# Patient Record
Sex: MALE | Race: WHITE | Employment: OTHER | URBAN - METROPOLITAN AREA
[De-identification: names, ages, dates, MRNs, and addresses within clinical notes are randomized per-mention and may not be internally consistent; named-entity substitution may affect disease eponyms.]

---

## 2019-01-01 ENCOUNTER — HOSPITAL ENCOUNTER (OUTPATIENT)
Age: 74
Setting detail: OBSERVATION
Discharge: HOME | End: 2019-12-25
Attending: EMERGENCY MEDICINE | Admitting: INTERNAL MEDICINE
Payer: MEDICARE

## 2019-01-01 ENCOUNTER — APPOINTMENT (OUTPATIENT)
Dept: ULTRASOUND IMAGING | Age: 74
End: 2019-01-01
Payer: MEDICARE

## 2019-01-01 ENCOUNTER — HOSPITAL ENCOUNTER (OUTPATIENT)
Age: 74
Discharge: HOME | End: 2019-11-28
Payer: MEDICARE

## 2019-01-01 ENCOUNTER — HOSPITAL ENCOUNTER (OUTPATIENT)
Dept: GENERAL RADIOLOGY | Age: 74
Discharge: HOME | End: 2019-11-28
Payer: MEDICARE

## 2019-01-01 ENCOUNTER — APPOINTMENT (OUTPATIENT)
Dept: GENERAL RADIOLOGY | Age: 74
End: 2019-01-01
Payer: MEDICARE

## 2019-01-01 VITALS
TEMPERATURE: 97.4 F | OXYGEN SATURATION: 94 % | RESPIRATION RATE: 20 BRPM | HEART RATE: 82 BPM | WEIGHT: 270.1 LBS | HEIGHT: 70 IN | SYSTOLIC BLOOD PRESSURE: 143 MMHG | DIASTOLIC BLOOD PRESSURE: 87 MMHG | BODY MASS INDEX: 38.67 KG/M2

## 2019-01-01 DIAGNOSIS — R06.00 DYSPNEA, UNSPECIFIED TYPE: ICD-10-CM

## 2019-01-01 DIAGNOSIS — I50.9 ACUTE DECOMPENSATED HEART FAILURE (HCC): Primary | ICD-10-CM

## 2019-01-01 DIAGNOSIS — N17.9 ACUTE RENAL FAILURE, UNSPECIFIED ACUTE RENAL FAILURE TYPE (HCC): ICD-10-CM

## 2019-01-01 LAB
ALBUMIN SERPL-MCNC: 3.7 G/DL (ref 3.5–5.2)
ALP BLD-CCNC: 55 U/L (ref 40–129)
ALT SERPL-CCNC: 16 U/L (ref 0–40)
ANION GAP SERPL CALCULATED.3IONS-SCNC: 12 MMOL/L (ref 7–16)
ANION GAP SERPL CALCULATED.3IONS-SCNC: 14 MMOL/L (ref 7–16)
ANION GAP SERPL CALCULATED.3IONS-SCNC: 14 MMOL/L (ref 7–16)
ANION GAP SERPL CALCULATED.3IONS-SCNC: 17 MMOL/L (ref 7–16)
AST SERPL-CCNC: 23 U/L (ref 0–39)
BACTERIA: ABNORMAL /HPF
BILIRUB SERPL-MCNC: 0.4 MG/DL (ref 0–1.2)
BILIRUBIN URINE: NEGATIVE
BLOOD, URINE: ABNORMAL
BUN BLDV-MCNC: 46 MG/DL (ref 8–23)
BUN BLDV-MCNC: 48 MG/DL (ref 8–23)
BUN BLDV-MCNC: 48 MG/DL (ref 8–23)
BUN BLDV-MCNC: 50 MG/DL (ref 8–23)
CALCIUM SERPL-MCNC: 8.6 MG/DL (ref 8.6–10.2)
CALCIUM SERPL-MCNC: 9 MG/DL (ref 8.6–10.2)
CALCIUM SERPL-MCNC: 9.2 MG/DL (ref 8.6–10.2)
CALCIUM SERPL-MCNC: 9.6 MG/DL (ref 8.6–10.2)
CHLORIDE BLD-SCNC: 101 MMOL/L (ref 98–107)
CHLORIDE BLD-SCNC: 101 MMOL/L (ref 98–107)
CHLORIDE BLD-SCNC: 102 MMOL/L (ref 98–107)
CHLORIDE BLD-SCNC: 103 MMOL/L (ref 98–107)
CLARITY: CLEAR
CO2: 23 MMOL/L (ref 22–29)
CO2: 24 MMOL/L (ref 22–29)
CO2: 25 MMOL/L (ref 22–29)
CO2: 25 MMOL/L (ref 22–29)
COLOR: YELLOW
CREAT SERPL-MCNC: 2.8 MG/DL (ref 0.7–1.2)
CREAT SERPL-MCNC: 3 MG/DL (ref 0.7–1.2)
CREAT SERPL-MCNC: 3.2 MG/DL (ref 0.7–1.2)
CREAT SERPL-MCNC: 3.4 MG/DL (ref 0.7–1.2)
EKG ATRIAL RATE: 88 BPM
EKG Q-T INTERVAL: 414 MS
EKG QRS DURATION: 130 MS
EKG QTC CALCULATION (BAZETT): 506 MS
EKG R AXIS: -13 DEGREES
EKG T AXIS: 41 DEGREES
EKG VENTRICULAR RATE: 90 BPM
EPITHELIAL CELLS, UA: ABNORMAL /HPF
GFR AFRICAN AMERICAN: 22
GFR AFRICAN AMERICAN: 23
GFR AFRICAN AMERICAN: 25
GFR AFRICAN AMERICAN: 27
GFR NON-AFRICAN AMERICAN: 18 ML/MIN/1.73
GFR NON-AFRICAN AMERICAN: 19 ML/MIN/1.73
GFR NON-AFRICAN AMERICAN: 21 ML/MIN/1.73
GFR NON-AFRICAN AMERICAN: 22 ML/MIN/1.73
GLUCOSE BLD-MCNC: 107 MG/DL (ref 74–99)
GLUCOSE BLD-MCNC: 112 MG/DL (ref 74–99)
GLUCOSE BLD-MCNC: 113 MG/DL (ref 74–99)
GLUCOSE BLD-MCNC: 118 MG/DL (ref 74–99)
GLUCOSE URINE: NEGATIVE MG/DL
HCT VFR BLD CALC: 38.7 % (ref 37–54)
HCT VFR BLD CALC: 39.7 % (ref 37–54)
HCT VFR BLD CALC: 41.7 % (ref 37–54)
HEMOGLOBIN: 11.8 G/DL (ref 12.5–16.5)
HEMOGLOBIN: 12.3 G/DL (ref 12.5–16.5)
HEMOGLOBIN: 12.5 G/DL (ref 12.5–16.5)
KETONES, URINE: NEGATIVE MG/DL
LEUKOCYTE ESTERASE, URINE: NEGATIVE
MAGNESIUM: 1.8 MG/DL (ref 1.6–2.6)
MAGNESIUM: 1.9 MG/DL (ref 1.6–2.6)
MCH RBC QN AUTO: 28.7 PG (ref 26–35)
MCH RBC QN AUTO: 29 PG (ref 26–35)
MCH RBC QN AUTO: 29.5 PG (ref 26–35)
MCHC RBC AUTO-ENTMCNC: 30 % (ref 32–34.5)
MCHC RBC AUTO-ENTMCNC: 30.5 % (ref 32–34.5)
MCHC RBC AUTO-ENTMCNC: 31 % (ref 32–34.5)
MCV RBC AUTO: 94.2 FL (ref 80–99.9)
MCV RBC AUTO: 95.2 FL (ref 80–99.9)
MCV RBC AUTO: 96.8 FL (ref 80–99.9)
NITRITE, URINE: NEGATIVE
PDW BLD-RTO: 14.5 FL (ref 11.5–15)
PDW BLD-RTO: 14.5 FL (ref 11.5–15)
PDW BLD-RTO: 14.6 FL (ref 11.5–15)
PH UA: 5.5 (ref 5–9)
PHOSPHORUS: 3.7 MG/DL (ref 2.5–4.5)
PHOSPHORUS: 4.3 MG/DL (ref 2.5–4.5)
PLATELET # BLD: 178 E9/L (ref 130–450)
PLATELET # BLD: 193 E9/L (ref 130–450)
PLATELET # BLD: 193 E9/L (ref 130–450)
PMV BLD AUTO: 10.6 FL (ref 7–12)
PMV BLD AUTO: 10.9 FL (ref 7–12)
PMV BLD AUTO: 11.1 FL (ref 7–12)
POTASSIUM REFLEX MAGNESIUM: 4.9 MMOL/L (ref 3.5–5)
POTASSIUM SERPL-SCNC: 4.1 MMOL/L (ref 3.5–5)
POTASSIUM SERPL-SCNC: 4.4 MMOL/L (ref 3.5–5)
POTASSIUM SERPL-SCNC: 4.9 MMOL/L (ref 3.5–5)
PRO-BNP: ABNORMAL PG/ML (ref 0–125)
PROTEIN UA: 100 MG/DL
RBC # BLD: 4.11 E12/L (ref 3.8–5.8)
RBC # BLD: 4.17 E12/L (ref 3.8–5.8)
RBC # BLD: 4.31 E12/L (ref 3.8–5.8)
RBC UA: ABNORMAL /HPF (ref 0–2)
SODIUM BLD-SCNC: 139 MMOL/L (ref 132–146)
SODIUM BLD-SCNC: 140 MMOL/L (ref 132–146)
SODIUM BLD-SCNC: 140 MMOL/L (ref 132–146)
SODIUM BLD-SCNC: 142 MMOL/L (ref 132–146)
SPECIFIC GRAVITY UA: >=1.03 (ref 1–1.03)
TOTAL PROTEIN: 7.2 G/DL (ref 6.4–8.3)
TROPONIN: <0.01 NG/ML (ref 0–0.03)
UROBILINOGEN, URINE: 0.2 E.U./DL
WBC # BLD: 6.4 E9/L (ref 4.5–11.5)
WBC # BLD: 7.7 E9/L (ref 4.5–11.5)
WBC # BLD: 8.3 E9/L (ref 4.5–11.5)
WBC UA: ABNORMAL /HPF (ref 0–5)

## 2019-01-01 PROCEDURE — 85027 COMPLETE CBC AUTOMATED: CPT

## 2019-01-01 PROCEDURE — 84484 ASSAY OF TROPONIN QUANT: CPT

## 2019-01-01 PROCEDURE — 96376 TX/PRO/DX INJ SAME DRUG ADON: CPT

## 2019-01-01 PROCEDURE — 71046 X-RAY EXAM CHEST 2 VIEWS: CPT

## 2019-01-01 PROCEDURE — 36415 COLL VENOUS BLD VENIPUNCTURE: CPT

## 2019-01-01 PROCEDURE — 6370000000 HC RX 637 (ALT 250 FOR IP): Performed by: INTERNAL MEDICINE

## 2019-01-01 PROCEDURE — 2500000003 HC RX 250 WO HCPCS: Performed by: STUDENT IN AN ORGANIZED HEALTH CARE EDUCATION/TRAINING PROGRAM

## 2019-01-01 PROCEDURE — 84100 ASSAY OF PHOSPHORUS: CPT

## 2019-01-01 PROCEDURE — 76770 US EXAM ABDO BACK WALL COMP: CPT

## 2019-01-01 PROCEDURE — 2500000003 HC RX 250 WO HCPCS: Performed by: INTERNAL MEDICINE

## 2019-01-01 PROCEDURE — G0378 HOSPITAL OBSERVATION PER HR: HCPCS

## 2019-01-01 PROCEDURE — 96374 THER/PROPH/DIAG INJ IV PUSH: CPT

## 2019-01-01 PROCEDURE — 83735 ASSAY OF MAGNESIUM: CPT

## 2019-01-01 PROCEDURE — 93005 ELECTROCARDIOGRAM TRACING: CPT | Performed by: NURSE PRACTITIONER

## 2019-01-01 PROCEDURE — 81001 URINALYSIS AUTO W/SCOPE: CPT

## 2019-01-01 PROCEDURE — 80048 BASIC METABOLIC PNL TOTAL CA: CPT

## 2019-01-01 PROCEDURE — 99285 EMERGENCY DEPT VISIT HI MDM: CPT

## 2019-01-01 PROCEDURE — 83880 ASSAY OF NATRIURETIC PEPTIDE: CPT

## 2019-01-01 PROCEDURE — 93010 ELECTROCARDIOGRAM REPORT: CPT | Performed by: INTERNAL MEDICINE

## 2019-01-01 PROCEDURE — 2580000003 HC RX 258: Performed by: INTERNAL MEDICINE

## 2019-01-01 PROCEDURE — 80053 COMPREHEN METABOLIC PANEL: CPT

## 2019-01-01 RX ORDER — ISOSORBIDE MONONITRATE 60 MG/1
60 TABLET, EXTENDED RELEASE ORAL DAILY
Qty: 30 TABLET | Refills: 3 | Status: ON HOLD
Start: 2019-01-01 | End: 2020-01-01

## 2019-01-01 RX ORDER — ISOSORBIDE MONONITRATE 30 MG/1
30 TABLET, EXTENDED RELEASE ORAL DAILY
Status: DISCONTINUED | OUTPATIENT
Start: 2019-01-01 | End: 2019-01-01

## 2019-01-01 RX ORDER — ONDANSETRON 2 MG/ML
4 INJECTION INTRAMUSCULAR; INTRAVENOUS EVERY 6 HOURS PRN
Status: DISCONTINUED | OUTPATIENT
Start: 2019-01-01 | End: 2019-01-01 | Stop reason: HOSPADM

## 2019-01-01 RX ORDER — BUMETANIDE 2 MG/1
1 TABLET ORAL DAILY
Qty: 30 TABLET | Refills: 3 | Status: ON HOLD | OUTPATIENT
Start: 2019-01-01 | End: 2020-01-01 | Stop reason: ALTCHOICE

## 2019-01-01 RX ORDER — SODIUM CHLORIDE 0.9 % (FLUSH) 0.9 %
10 SYRINGE (ML) INJECTION PRN
Status: DISCONTINUED | OUTPATIENT
Start: 2019-01-01 | End: 2019-01-01 | Stop reason: HOSPADM

## 2019-01-01 RX ORDER — BUPROPION HYDROCHLORIDE 150 MG/1
150 TABLET ORAL EVERY MORNING
Status: DISCONTINUED | OUTPATIENT
Start: 2019-01-01 | End: 2019-01-01 | Stop reason: HOSPADM

## 2019-01-01 RX ORDER — BUMETANIDE 0.25 MG/ML
1 INJECTION, SOLUTION INTRAMUSCULAR; INTRAVENOUS DAILY
Status: DISCONTINUED | OUTPATIENT
Start: 2019-01-01 | End: 2019-01-01

## 2019-01-01 RX ORDER — METOPROLOL SUCCINATE 25 MG/1
25 TABLET, EXTENDED RELEASE ORAL 2 TIMES DAILY
Status: DISCONTINUED | OUTPATIENT
Start: 2019-01-01 | End: 2019-01-01 | Stop reason: HOSPADM

## 2019-01-01 RX ORDER — ROSUVASTATIN CALCIUM 5 MG/1
5 TABLET, COATED ORAL
Status: ON HOLD | COMMUNITY
End: 2020-01-01 | Stop reason: SINTOL

## 2019-01-01 RX ORDER — ACETAMINOPHEN 325 MG/1
650 TABLET ORAL EVERY 4 HOURS PRN
Status: DISCONTINUED | OUTPATIENT
Start: 2019-01-01 | End: 2019-01-01 | Stop reason: HOSPADM

## 2019-01-01 RX ORDER — TIZANIDINE 4 MG/1
4 TABLET ORAL EVERY 8 HOURS PRN
Status: DISCONTINUED | OUTPATIENT
Start: 2019-01-01 | End: 2019-01-01 | Stop reason: HOSPADM

## 2019-01-01 RX ORDER — LEVOTHYROXINE SODIUM 0.03 MG/1
25 TABLET ORAL DAILY
Status: DISCONTINUED | OUTPATIENT
Start: 2019-01-01 | End: 2019-01-01 | Stop reason: HOSPADM

## 2019-01-01 RX ORDER — SODIUM CHLORIDE 0.9 % (FLUSH) 0.9 %
10 SYRINGE (ML) INJECTION EVERY 12 HOURS SCHEDULED
Status: DISCONTINUED | OUTPATIENT
Start: 2019-01-01 | End: 2019-01-01 | Stop reason: HOSPADM

## 2019-01-01 RX ORDER — BUMETANIDE 0.25 MG/ML
1 INJECTION, SOLUTION INTRAMUSCULAR; INTRAVENOUS ONCE
Status: COMPLETED | OUTPATIENT
Start: 2019-01-01 | End: 2019-01-01

## 2019-01-01 RX ORDER — ROSUVASTATIN CALCIUM 5 MG/1
5 TABLET, COATED ORAL
Status: DISCONTINUED | OUTPATIENT
Start: 2019-01-01 | End: 2019-01-01 | Stop reason: HOSPADM

## 2019-01-01 RX ORDER — BUPROPION HYDROCHLORIDE 150 MG/1
150 TABLET ORAL EVERY MORNING
COMMUNITY

## 2019-01-01 RX ORDER — ISOSORBIDE MONONITRATE 60 MG/1
60 TABLET, EXTENDED RELEASE ORAL DAILY
Status: DISCONTINUED | OUTPATIENT
Start: 2019-01-01 | End: 2019-01-01 | Stop reason: HOSPADM

## 2019-01-01 RX ADMIN — LEVOTHYROXINE SODIUM 25 MCG: 25 TABLET ORAL at 10:05

## 2019-01-01 RX ADMIN — BUMETANIDE 1 MG: 0.25 INJECTION INTRAMUSCULAR; INTRAVENOUS at 10:06

## 2019-01-01 RX ADMIN — METOPROLOL SUCCINATE 25 MG: 25 TABLET, EXTENDED RELEASE ORAL at 10:05

## 2019-01-01 RX ADMIN — ACETAMINOPHEN 650 MG: 325 TABLET, FILM COATED ORAL at 21:01

## 2019-01-01 RX ADMIN — BUMETANIDE 1 MG: 0.25 INJECTION INTRAMUSCULAR; INTRAVENOUS at 12:51

## 2019-01-01 RX ADMIN — METOPROLOL SUCCINATE 25 MG: 25 TABLET, EXTENDED RELEASE ORAL at 21:01

## 2019-01-01 RX ADMIN — LEVOTHYROXINE SODIUM 25 MCG: 25 TABLET ORAL at 06:24

## 2019-01-01 RX ADMIN — TIZANIDINE 4 MG: 4 TABLET ORAL at 04:36

## 2019-01-01 RX ADMIN — ISOSORBIDE MONONITRATE 60 MG: 60 TABLET ORAL at 08:41

## 2019-01-01 RX ADMIN — APIXABAN 5 MG: 5 TABLET, FILM COATED ORAL at 10:06

## 2019-01-01 RX ADMIN — METOPROLOL SUCCINATE 25 MG: 25 TABLET, EXTENDED RELEASE ORAL at 08:41

## 2019-01-01 RX ADMIN — ROSUVASTATIN CALCIUM 5 MG: 5 TABLET, FILM COATED ORAL at 08:41

## 2019-01-01 RX ADMIN — APIXABAN 5 MG: 5 TABLET, FILM COATED ORAL at 20:41

## 2019-01-01 RX ADMIN — ISOSORBIDE MONONITRATE 60 MG: 60 TABLET ORAL at 10:06

## 2019-01-01 RX ADMIN — BUPROPION HYDROCHLORIDE 150 MG: 150 TABLET, EXTENDED RELEASE ORAL at 08:41

## 2019-01-01 RX ADMIN — APIXABAN 5 MG: 5 TABLET, FILM COATED ORAL at 08:41

## 2019-01-01 RX ADMIN — SODIUM CHLORIDE, PRESERVATIVE FREE 10 ML: 5 INJECTION INTRAVENOUS at 08:42

## 2019-01-01 RX ADMIN — BUPROPION HYDROCHLORIDE 150 MG: 150 TABLET, EXTENDED RELEASE ORAL at 10:06

## 2019-01-01 RX ADMIN — BUMETANIDE 1 MG: 0.25 INJECTION INTRAMUSCULAR; INTRAVENOUS at 21:01

## 2019-01-01 RX ADMIN — APIXABAN 5 MG: 5 TABLET, FILM COATED ORAL at 21:01

## 2019-01-01 RX ADMIN — BUMETANIDE 1 MG: 0.25 INJECTION INTRAMUSCULAR; INTRAVENOUS at 08:40

## 2019-01-01 RX ADMIN — TIZANIDINE 4 MG: 4 TABLET ORAL at 21:00

## 2019-01-01 RX ADMIN — ISOSORBIDE MONONITRATE 30 MG: 30 TABLET, EXTENDED RELEASE ORAL at 21:05

## 2019-01-01 RX ADMIN — ACETAMINOPHEN 650 MG: 325 TABLET, FILM COATED ORAL at 19:08

## 2019-01-01 RX ADMIN — SODIUM CHLORIDE, PRESERVATIVE FREE 10 ML: 5 INJECTION INTRAVENOUS at 10:07

## 2019-01-01 RX ADMIN — METOPROLOL SUCCINATE 25 MG: 25 TABLET, EXTENDED RELEASE ORAL at 20:41

## 2019-01-01 RX ADMIN — SODIUM CHLORIDE, PRESERVATIVE FREE 10 ML: 5 INJECTION INTRAVENOUS at 20:42

## 2019-01-01 ASSESSMENT — PAIN DESCRIPTION - LOCATION
LOCATION: HEAD
LOCATION: GENERALIZED;COCCYX;BUTTOCKS
LOCATION: ABDOMEN

## 2019-01-01 ASSESSMENT — PAIN DESCRIPTION - PAIN TYPE
TYPE: ACUTE PAIN
TYPE: ACUTE PAIN
TYPE: CHRONIC PAIN;NEUROPATHIC PAIN;DEEP SOMATIC PAIN

## 2019-01-01 ASSESSMENT — PAIN SCALES - GENERAL
PAINLEVEL_OUTOF10: 0
PAINLEVEL_OUTOF10: 5
PAINLEVEL_OUTOF10: 3
PAINLEVEL_OUTOF10: 0
PAINLEVEL_OUTOF10: 8
PAINLEVEL_OUTOF10: 5

## 2019-01-01 ASSESSMENT — PAIN DESCRIPTION - DESCRIPTORS
DESCRIPTORS: ACHING
DESCRIPTORS: ACHING

## 2019-01-01 ASSESSMENT — PAIN DESCRIPTION - ORIENTATION: ORIENTATION: ANTERIOR

## 2019-01-01 ASSESSMENT — PAIN DESCRIPTION - FREQUENCY: FREQUENCY: INTERMITTENT

## 2019-12-23 PROBLEM — I50.9 ACUTE DECOMPENSATED HEART FAILURE (HCC): Status: ACTIVE | Noted: 2019-01-01

## 2019-12-23 PROBLEM — I51.9 LEFT VENTRICULAR SYSTOLIC DYSFUNCTION: Chronic | Status: ACTIVE | Noted: 2019-01-01

## 2019-12-23 PROBLEM — N18.4 CKD (CHRONIC KIDNEY DISEASE) STAGE 4, GFR 15-29 ML/MIN (HCC): Chronic | Status: ACTIVE | Noted: 2019-01-01

## 2020-01-01 ENCOUNTER — APPOINTMENT (OUTPATIENT)
Dept: CT IMAGING | Age: 75
End: 2020-01-01
Payer: MEDICARE

## 2020-01-01 ENCOUNTER — APPOINTMENT (OUTPATIENT)
Dept: GENERAL RADIOLOGY | Age: 75
DRG: 308 | End: 2020-01-01
Payer: MEDICARE

## 2020-01-01 ENCOUNTER — APPOINTMENT (OUTPATIENT)
Dept: ULTRASOUND IMAGING | Age: 75
DRG: 291 | End: 2020-01-01
Payer: MEDICARE

## 2020-01-01 ENCOUNTER — APPOINTMENT (OUTPATIENT)
Dept: GENERAL RADIOLOGY | Age: 75
End: 2020-01-01
Payer: MEDICARE

## 2020-01-01 ENCOUNTER — TELEPHONE (OUTPATIENT)
Dept: SURGERY | Age: 75
End: 2020-01-01

## 2020-01-01 ENCOUNTER — OFFICE VISIT (OUTPATIENT)
Dept: SURGERY | Age: 75
End: 2020-01-01
Payer: MEDICARE

## 2020-01-01 ENCOUNTER — APPOINTMENT (OUTPATIENT)
Dept: ULTRASOUND IMAGING | Age: 75
End: 2020-01-01
Payer: MEDICARE

## 2020-01-01 ENCOUNTER — HOSPITAL ENCOUNTER (OUTPATIENT)
Age: 75
Setting detail: OBSERVATION
Discharge: HOME | End: 2020-01-07
Attending: EMERGENCY MEDICINE | Admitting: INTERNAL MEDICINE
Payer: MEDICARE

## 2020-01-01 ENCOUNTER — HOSPITAL ENCOUNTER (INPATIENT)
Age: 75
LOS: 3 days | Discharge: HOME HEALTH | DRG: 308 | End: 2020-02-04
Attending: EMERGENCY MEDICINE | Admitting: INTERNAL MEDICINE
Payer: MEDICARE

## 2020-01-01 ENCOUNTER — HOSPITAL ENCOUNTER (INPATIENT)
Age: 75
LOS: 6 days | Discharge: HOME HEALTH | DRG: 291 | End: 2020-01-21
Attending: EMERGENCY MEDICINE | Admitting: INTERNAL MEDICINE
Payer: MEDICARE

## 2020-01-01 ENCOUNTER — HOSPITAL ENCOUNTER (EMERGENCY)
Age: 75
Discharge: HOME | End: 2020-01-24
Attending: EMERGENCY MEDICINE
Payer: MEDICARE

## 2020-01-01 ENCOUNTER — HOSPITAL ENCOUNTER (OUTPATIENT)
Age: 75
Discharge: HOME | End: 2020-01-22
Payer: MEDICARE

## 2020-01-01 ENCOUNTER — HOSPITAL ENCOUNTER (EMERGENCY)
Age: 75
Discharge: HOME | End: 2020-01-28
Attending: EMERGENCY MEDICINE
Payer: MEDICARE

## 2020-01-01 ENCOUNTER — HOSPITAL ENCOUNTER (OUTPATIENT)
Dept: CARDIAC REHAB | Age: 75
Discharge: HOME | End: 2020-06-03

## 2020-01-01 ENCOUNTER — HOSPITAL ENCOUNTER (OUTPATIENT)
Age: 75
Discharge: HOME | End: 2020-04-02
Payer: MEDICARE

## 2020-01-01 ENCOUNTER — TELEPHONE (OUTPATIENT)
Dept: OTHER | Facility: CLINIC | Age: 75
End: 2020-01-01

## 2020-01-01 ENCOUNTER — HOSPITAL ENCOUNTER (OUTPATIENT)
Age: 75
Setting detail: OBSERVATION
Discharge: HOME | End: 2020-02-18
Attending: EMERGENCY MEDICINE | Admitting: INTERNAL MEDICINE
Payer: MEDICARE

## 2020-01-01 ENCOUNTER — APPOINTMENT (OUTPATIENT)
Dept: GENERAL RADIOLOGY | Age: 75
DRG: 291 | End: 2020-01-01
Payer: MEDICARE

## 2020-01-01 VITALS
BODY MASS INDEX: 35.78 KG/M2 | RESPIRATION RATE: 18 BRPM | HEART RATE: 103 BPM | WEIGHT: 249.9 LBS | DIASTOLIC BLOOD PRESSURE: 70 MMHG | HEIGHT: 70 IN | SYSTOLIC BLOOD PRESSURE: 118 MMHG | OXYGEN SATURATION: 95 % | TEMPERATURE: 97.9 F

## 2020-01-01 VITALS
RESPIRATION RATE: 18 BRPM | DIASTOLIC BLOOD PRESSURE: 59 MMHG | HEART RATE: 85 BPM | TEMPERATURE: 98.6 F | SYSTOLIC BLOOD PRESSURE: 94 MMHG | WEIGHT: 255 LBS | OXYGEN SATURATION: 93 % | HEIGHT: 70 IN | BODY MASS INDEX: 36.51 KG/M2

## 2020-01-01 VITALS
DIASTOLIC BLOOD PRESSURE: 94 MMHG | HEIGHT: 70 IN | SYSTOLIC BLOOD PRESSURE: 138 MMHG | HEART RATE: 98 BPM | OXYGEN SATURATION: 96 % | TEMPERATURE: 97.8 F | WEIGHT: 264.33 LBS | BODY MASS INDEX: 37.84 KG/M2 | RESPIRATION RATE: 16 BRPM

## 2020-01-01 VITALS
TEMPERATURE: 98 F | WEIGHT: 269.18 LBS | DIASTOLIC BLOOD PRESSURE: 58 MMHG | HEIGHT: 70 IN | RESPIRATION RATE: 16 BRPM | HEART RATE: 100 BPM | SYSTOLIC BLOOD PRESSURE: 105 MMHG | BODY MASS INDEX: 38.54 KG/M2 | OXYGEN SATURATION: 94 %

## 2020-01-01 VITALS
BODY MASS INDEX: 34.86 KG/M2 | RESPIRATION RATE: 20 BRPM | WEIGHT: 249 LBS | SYSTOLIC BLOOD PRESSURE: 86 MMHG | HEART RATE: 53 BPM | HEIGHT: 71 IN | DIASTOLIC BLOOD PRESSURE: 45 MMHG

## 2020-01-01 VITALS
RESPIRATION RATE: 15 BRPM | WEIGHT: 269 LBS | BODY MASS INDEX: 38.51 KG/M2 | HEART RATE: 66 BPM | OXYGEN SATURATION: 94 % | DIASTOLIC BLOOD PRESSURE: 70 MMHG | HEIGHT: 70 IN | SYSTOLIC BLOOD PRESSURE: 132 MMHG

## 2020-01-01 VITALS
WEIGHT: 266 LBS | OXYGEN SATURATION: 98 % | RESPIRATION RATE: 16 BRPM | TEMPERATURE: 97.8 F | SYSTOLIC BLOOD PRESSURE: 149 MMHG | DIASTOLIC BLOOD PRESSURE: 93 MMHG | HEART RATE: 79 BPM | HEIGHT: 70 IN | BODY MASS INDEX: 38.08 KG/M2

## 2020-01-01 VITALS
WEIGHT: 266 LBS | TEMPERATURE: 97.4 F | OXYGEN SATURATION: 98 % | HEART RATE: 103 BPM | BODY MASS INDEX: 38.08 KG/M2 | HEIGHT: 70 IN | SYSTOLIC BLOOD PRESSURE: 126 MMHG | DIASTOLIC BLOOD PRESSURE: 89 MMHG | RESPIRATION RATE: 18 BRPM

## 2020-01-01 LAB
ALBUMIN SERPL-MCNC: 3.8 G/DL (ref 3.5–5.2)
ALBUMIN SERPL-MCNC: 3.9 G/DL (ref 3.5–5.2)
ALBUMIN SERPL-MCNC: 4 G/DL (ref 3.5–5.2)
ALBUMIN SERPL-MCNC: 4 G/DL (ref 3.5–5.2)
ALP BLD-CCNC: 58 U/L (ref 40–129)
ALP BLD-CCNC: 61 U/L (ref 40–129)
ALP BLD-CCNC: 78 U/L (ref 40–129)
ALP BLD-CCNC: 79 U/L (ref 40–129)
ALT SERPL-CCNC: 15 U/L (ref 0–40)
ALT SERPL-CCNC: 16 U/L (ref 0–40)
ALT SERPL-CCNC: 23 U/L (ref 0–40)
ALT SERPL-CCNC: 45 U/L (ref 0–40)
AMORPHOUS: ABNORMAL
ANION GAP SERPL CALCULATED.3IONS-SCNC: 11 MMOL/L (ref 7–16)
ANION GAP SERPL CALCULATED.3IONS-SCNC: 12 MMOL/L (ref 7–16)
ANION GAP SERPL CALCULATED.3IONS-SCNC: 12 MMOL/L (ref 7–16)
ANION GAP SERPL CALCULATED.3IONS-SCNC: 14 MMOL/L (ref 7–16)
ANION GAP SERPL CALCULATED.3IONS-SCNC: 15 MMOL/L (ref 7–16)
ANION GAP SERPL CALCULATED.3IONS-SCNC: 16 MMOL/L (ref 7–16)
ANION GAP SERPL CALCULATED.3IONS-SCNC: 16 MMOL/L (ref 7–16)
ANION GAP SERPL CALCULATED.3IONS-SCNC: 18 MMOL/L (ref 7–16)
ANION GAP SERPL CALCULATED.3IONS-SCNC: 19 MMOL/L (ref 7–16)
ANION GAP SERPL CALCULATED.3IONS-SCNC: 19 MMOL/L (ref 7–16)
ANION GAP SERPL CALCULATED.3IONS-SCNC: 21 MMOL/L (ref 7–16)
ANION GAP SERPL CALCULATED.3IONS-SCNC: 25 MMOL/L (ref 7–16)
ANION GAP SERPL CALCULATED.3IONS-SCNC: 26 MMOL/L (ref 7–16)
ANION GAP SERPL CALCULATED.3IONS-SCNC: 28 MMOL/L (ref 7–16)
AST SERPL-CCNC: 100 U/L (ref 0–39)
AST SERPL-CCNC: 19 U/L (ref 0–39)
AST SERPL-CCNC: 19 U/L (ref 0–39)
AST SERPL-CCNC: 20 U/L (ref 0–39)
BACTERIA: ABNORMAL /HPF
BASOPHILS ABSOLUTE: 0.04 E9/L (ref 0–0.2)
BASOPHILS ABSOLUTE: 0.05 E9/L (ref 0–0.2)
BASOPHILS ABSOLUTE: 0.05 E9/L (ref 0–0.2)
BASOPHILS ABSOLUTE: 0.06 E9/L (ref 0–0.2)
BASOPHILS ABSOLUTE: 0.06 E9/L (ref 0–0.2)
BASOPHILS ABSOLUTE: 0.09 E9/L (ref 0–0.2)
BASOPHILS RELATIVE PERCENT: 0.3 % (ref 0–2)
BASOPHILS RELATIVE PERCENT: 0.5 % (ref 0–2)
BASOPHILS RELATIVE PERCENT: 0.6 % (ref 0–2)
BASOPHILS RELATIVE PERCENT: 0.6 % (ref 0–2)
BASOPHILS RELATIVE PERCENT: 0.7 % (ref 0–2)
BASOPHILS RELATIVE PERCENT: 0.9 % (ref 0–2)
BILIRUB SERPL-MCNC: 0.7 MG/DL (ref 0–1.2)
BILIRUB SERPL-MCNC: 0.7 MG/DL (ref 0–1.2)
BILIRUB SERPL-MCNC: 0.8 MG/DL (ref 0–1.2)
BILIRUB SERPL-MCNC: 1.7 MG/DL (ref 0–1.2)
BILIRUBIN URINE: ABNORMAL
BILIRUBIN URINE: NEGATIVE
BILIRUBIN URINE: NEGATIVE
BLOOD CULTURE, ROUTINE: NORMAL
BLOOD, URINE: ABNORMAL
BUN BLDV-MCNC: 42 MG/DL (ref 8–23)
BUN BLDV-MCNC: 44 MG/DL (ref 8–23)
BUN BLDV-MCNC: 48 MG/DL (ref 8–23)
BUN BLDV-MCNC: 49 MG/DL (ref 8–23)
BUN BLDV-MCNC: 52 MG/DL (ref 8–23)
BUN BLDV-MCNC: 54 MG/DL (ref 8–23)
BUN BLDV-MCNC: 56 MG/DL (ref 8–23)
BUN BLDV-MCNC: 56 MG/DL (ref 8–23)
BUN BLDV-MCNC: 58 MG/DL (ref 8–23)
BUN BLDV-MCNC: 60 MG/DL (ref 8–23)
BUN BLDV-MCNC: 62 MG/DL (ref 8–23)
BUN BLDV-MCNC: 64 MG/DL (ref 8–23)
BUN BLDV-MCNC: 65 MG/DL (ref 8–23)
BUN BLDV-MCNC: 66 MG/DL (ref 8–23)
BUN BLDV-MCNC: 66 MG/DL (ref 8–23)
BUN BLDV-MCNC: 67 MG/DL (ref 8–23)
BUN BLDV-MCNC: 82 MG/DL (ref 8–23)
BUN BLDV-MCNC: 93 MG/DL (ref 8–23)
BUN BLDV-MCNC: 95 MG/DL (ref 8–23)
CALCIUM SERPL-MCNC: 8.3 MG/DL (ref 8.6–10.2)
CALCIUM SERPL-MCNC: 8.4 MG/DL (ref 8.6–10.2)
CALCIUM SERPL-MCNC: 8.5 MG/DL (ref 8.6–10.2)
CALCIUM SERPL-MCNC: 8.6 MG/DL (ref 8.6–10.2)
CALCIUM SERPL-MCNC: 8.6 MG/DL (ref 8.6–10.2)
CALCIUM SERPL-MCNC: 8.7 MG/DL (ref 8.6–10.2)
CALCIUM SERPL-MCNC: 8.8 MG/DL (ref 8.6–10.2)
CALCIUM SERPL-MCNC: 8.9 MG/DL (ref 8.6–10.2)
CALCIUM SERPL-MCNC: 9 MG/DL (ref 8.6–10.2)
CALCIUM SERPL-MCNC: 9.2 MG/DL (ref 8.6–10.2)
CALCIUM SERPL-MCNC: 9.5 MG/DL (ref 8.6–10.2)
CALCIUM SERPL-MCNC: 9.6 MG/DL (ref 8.6–10.2)
CALCIUM SERPL-MCNC: 9.6 MG/DL (ref 8.6–10.2)
CHLORIDE BLD-SCNC: 100 MMOL/L (ref 98–107)
CHLORIDE BLD-SCNC: 100 MMOL/L (ref 98–107)
CHLORIDE BLD-SCNC: 101 MMOL/L (ref 98–107)
CHLORIDE BLD-SCNC: 103 MMOL/L (ref 98–107)
CHLORIDE BLD-SCNC: 104 MMOL/L (ref 98–107)
CHLORIDE BLD-SCNC: 87 MMOL/L (ref 98–107)
CHLORIDE BLD-SCNC: 89 MMOL/L (ref 98–107)
CHLORIDE BLD-SCNC: 91 MMOL/L (ref 98–107)
CHLORIDE BLD-SCNC: 91 MMOL/L (ref 98–107)
CHLORIDE BLD-SCNC: 92 MMOL/L (ref 98–107)
CHLORIDE BLD-SCNC: 92 MMOL/L (ref 98–107)
CHLORIDE BLD-SCNC: 94 MMOL/L (ref 98–107)
CHLORIDE BLD-SCNC: 95 MMOL/L (ref 98–107)
CHLORIDE BLD-SCNC: 95 MMOL/L (ref 98–107)
CHLORIDE BLD-SCNC: 96 MMOL/L (ref 98–107)
CHLORIDE BLD-SCNC: 96 MMOL/L (ref 98–107)
CHLORIDE BLD-SCNC: 97 MMOL/L (ref 98–107)
CHLORIDE BLD-SCNC: 97 MMOL/L (ref 98–107)
CHLORIDE BLD-SCNC: 98 MMOL/L (ref 98–107)
CHLORIDE URINE RANDOM: <20 MMOL/L
CHLORIDE URINE RANDOM: <20 MMOL/L
CHOLESTEROL, TOTAL: 128 MG/DL (ref 0–199)
CHP ED QC CHECK: NORMAL
CLARITY: CLEAR
CO2: 19 MMOL/L (ref 22–29)
CO2: 20 MMOL/L (ref 22–29)
CO2: 21 MMOL/L (ref 22–29)
CO2: 21 MMOL/L (ref 22–29)
CO2: 22 MMOL/L (ref 22–29)
CO2: 22 MMOL/L (ref 22–29)
CO2: 24 MMOL/L (ref 22–29)
CO2: 24 MMOL/L (ref 22–29)
CO2: 25 MMOL/L (ref 22–29)
CO2: 25 MMOL/L (ref 22–29)
CO2: 26 MMOL/L (ref 22–29)
CO2: 27 MMOL/L (ref 22–29)
CO2: 28 MMOL/L (ref 22–29)
CO2: 30 MMOL/L (ref 22–29)
CO2: 30 MMOL/L (ref 22–29)
CO2: 31 MMOL/L (ref 22–29)
CO2: 32 MMOL/L (ref 22–29)
COLOR: YELLOW
CREAT SERPL-MCNC: 1.9 MG/DL (ref 0.7–1.2)
CREAT SERPL-MCNC: 2.5 MG/DL (ref 0.7–1.2)
CREAT SERPL-MCNC: 2.6 MG/DL (ref 0.7–1.2)
CREAT SERPL-MCNC: 2.8 MG/DL (ref 0.7–1.2)
CREAT SERPL-MCNC: 2.8 MG/DL (ref 0.7–1.2)
CREAT SERPL-MCNC: 2.9 MG/DL (ref 0.7–1.2)
CREAT SERPL-MCNC: 2.9 MG/DL (ref 0.7–1.2)
CREAT SERPL-MCNC: 3.1 MG/DL (ref 0.7–1.2)
CREAT SERPL-MCNC: 3.2 MG/DL (ref 0.7–1.2)
CREAT SERPL-MCNC: 3.3 MG/DL (ref 0.7–1.2)
CREAT SERPL-MCNC: 3.5 MG/DL (ref 0.7–1.2)
CREAT SERPL-MCNC: 3.6 MG/DL (ref 0.7–1.2)
CREAT SERPL-MCNC: 3.6 MG/DL (ref 0.7–1.2)
CREAT SERPL-MCNC: 3.9 MG/DL (ref 0.7–1.2)
CREAT SERPL-MCNC: 4 MG/DL (ref 0.7–1.2)
CREAT SERPL-MCNC: 4.6 MG/DL (ref 0.7–1.2)
CREAT SERPL-MCNC: 4.7 MG/DL (ref 0.7–1.2)
CREAT SERPL-MCNC: 5.3 MG/DL (ref 0.7–1.2)
CREAT SERPL-MCNC: 5.5 MG/DL (ref 0.7–1.2)
CREATININE URINE: 140 MG/DL (ref 40–278)
CREATININE URINE: 147 MG/DL (ref 40–278)
CREATININE URINE: 161 MG/DL (ref 40–278)
CREATININE URINE: 89 MG/DL (ref 40–278)
CULTURE, BLOOD 2: NORMAL
EKG ATRIAL RATE: 119 BPM
EKG ATRIAL RATE: 129 BPM
EKG ATRIAL RATE: 90 BPM
EKG ATRIAL RATE: 98 BPM
EKG Q-T INTERVAL: 360 MS
EKG Q-T INTERVAL: 388 MS
EKG Q-T INTERVAL: 418 MS
EKG Q-T INTERVAL: 424 MS
EKG QRS DURATION: 122 MS
EKG QRS DURATION: 134 MS
EKG QRS DURATION: 138 MS
EKG QRS DURATION: 152 MS
EKG QTC CALCULATION (BAZETT): 493 MS
EKG QTC CALCULATION (BAZETT): 516 MS
EKG QTC CALCULATION (BAZETT): 535 MS
EKG QTC CALCULATION (BAZETT): 557 MS
EKG R AXIS: -39 DEGREES
EKG R AXIS: -61 DEGREES
EKG R AXIS: -62 DEGREES
EKG R AXIS: -74 DEGREES
EKG T AXIS: 104 DEGREES
EKG T AXIS: 23 DEGREES
EKG T AXIS: 54 DEGREES
EKG T AXIS: 68 DEGREES
EKG VENTRICULAR RATE: 113 BPM
EKG VENTRICULAR RATE: 124 BPM
EKG VENTRICULAR RATE: 92 BPM
EKG VENTRICULAR RATE: 96 BPM
EOSINOPHILS ABSOLUTE: 0.01 E9/L (ref 0.05–0.5)
EOSINOPHILS ABSOLUTE: 0.28 E9/L (ref 0.05–0.5)
EOSINOPHILS ABSOLUTE: 0.41 E9/L (ref 0.05–0.5)
EOSINOPHILS ABSOLUTE: 0.44 E9/L (ref 0.05–0.5)
EOSINOPHILS ABSOLUTE: 0.49 E9/L (ref 0.05–0.5)
EOSINOPHILS ABSOLUTE: 0.78 E9/L (ref 0.05–0.5)
EOSINOPHILS RELATIVE PERCENT: 0.1 % (ref 0–6)
EOSINOPHILS RELATIVE PERCENT: 2.9 % (ref 0–6)
EOSINOPHILS RELATIVE PERCENT: 4.1 % (ref 0–6)
EOSINOPHILS RELATIVE PERCENT: 4.6 % (ref 0–6)
EOSINOPHILS RELATIVE PERCENT: 5.4 % (ref 0–6)
EOSINOPHILS RELATIVE PERCENT: 8.6 % (ref 0–6)
EPITHELIAL CELLS, UA: ABNORMAL /HPF
EPITHELIAL CELLS, UA: ABNORMAL /HPF
GFR AFRICAN AMERICAN: 12
GFR AFRICAN AMERICAN: 13
GFR AFRICAN AMERICAN: 15
GFR AFRICAN AMERICAN: 15
GFR AFRICAN AMERICAN: 18
GFR AFRICAN AMERICAN: 18
GFR AFRICAN AMERICAN: 20
GFR AFRICAN AMERICAN: 20
GFR AFRICAN AMERICAN: 21
GFR AFRICAN AMERICAN: 22
GFR AFRICAN AMERICAN: 23
GFR AFRICAN AMERICAN: 24
GFR AFRICAN AMERICAN: 26
GFR AFRICAN AMERICAN: 26
GFR AFRICAN AMERICAN: 27
GFR AFRICAN AMERICAN: 27
GFR AFRICAN AMERICAN: 29
GFR AFRICAN AMERICAN: 31
GFR AFRICAN AMERICAN: 42
GFR NON-AFRICAN AMERICAN: 10 ML/MIN/1.73
GFR NON-AFRICAN AMERICAN: 11 ML/MIN/1.73
GFR NON-AFRICAN AMERICAN: 12 ML/MIN/1.73
GFR NON-AFRICAN AMERICAN: 13 ML/MIN/1.73
GFR NON-AFRICAN AMERICAN: 15 ML/MIN/1.73
GFR NON-AFRICAN AMERICAN: 15 ML/MIN/1.73
GFR NON-AFRICAN AMERICAN: 17 ML/MIN/1.73
GFR NON-AFRICAN AMERICAN: 18 ML/MIN/1.73
GFR NON-AFRICAN AMERICAN: 19 ML/MIN/1.73
GFR NON-AFRICAN AMERICAN: 20 ML/MIN/1.73
GFR NON-AFRICAN AMERICAN: 21 ML/MIN/1.73
GFR NON-AFRICAN AMERICAN: 21 ML/MIN/1.73
GFR NON-AFRICAN AMERICAN: 22 ML/MIN/1.73
GFR NON-AFRICAN AMERICAN: 22 ML/MIN/1.73
GFR NON-AFRICAN AMERICAN: 24 ML/MIN/1.73
GFR NON-AFRICAN AMERICAN: 25 ML/MIN/1.73
GFR NON-AFRICAN AMERICAN: 35 ML/MIN/1.73
GLUCOSE BLD-MCNC: 100 MG/DL (ref 74–99)
GLUCOSE BLD-MCNC: 104 MG/DL (ref 74–99)
GLUCOSE BLD-MCNC: 107 MG/DL (ref 74–99)
GLUCOSE BLD-MCNC: 113 MG/DL (ref 74–99)
GLUCOSE BLD-MCNC: 116 MG/DL (ref 74–99)
GLUCOSE BLD-MCNC: 117 MG/DL (ref 74–99)
GLUCOSE BLD-MCNC: 118 MG/DL (ref 74–99)
GLUCOSE BLD-MCNC: 120 MG/DL (ref 74–99)
GLUCOSE BLD-MCNC: 127 MG/DL (ref 74–99)
GLUCOSE BLD-MCNC: 148 MG/DL
GLUCOSE BLD-MCNC: 165 MG/DL (ref 74–99)
GLUCOSE BLD-MCNC: 89 MG/DL (ref 74–99)
GLUCOSE BLD-MCNC: 92 MG/DL (ref 74–99)
GLUCOSE BLD-MCNC: 96 MG/DL (ref 74–99)
GLUCOSE BLD-MCNC: 97 MG/DL (ref 74–99)
GLUCOSE BLD-MCNC: 98 MG/DL (ref 74–99)
GLUCOSE BLD-MCNC: 99 MG/DL (ref 74–99)
GLUCOSE URINE: NEGATIVE MG/DL
HAV IGM SER IA-ACNC: NORMAL
HBV SURFACE AB TITR SER: NORMAL {TITER}
HCT VFR BLD CALC: 37.3 % (ref 37–54)
HCT VFR BLD CALC: 37.8 % (ref 37–54)
HCT VFR BLD CALC: 37.9 % (ref 37–54)
HCT VFR BLD CALC: 37.9 % (ref 37–54)
HCT VFR BLD CALC: 38.7 % (ref 37–54)
HCT VFR BLD CALC: 39.1 % (ref 37–54)
HCT VFR BLD CALC: 40.9 % (ref 37–54)
HCT VFR BLD CALC: 45.3 % (ref 37–54)
HCT VFR BLD CALC: 47.3 % (ref 37–54)
HDLC SERPL-MCNC: 24 MG/DL
HEMOGLOBIN: 11.2 G/DL (ref 12.5–16.5)
HEMOGLOBIN: 11.4 G/DL (ref 12.5–16.5)
HEMOGLOBIN: 11.5 G/DL (ref 12.5–16.5)
HEMOGLOBIN: 11.6 G/DL (ref 12.5–16.5)
HEMOGLOBIN: 11.7 G/DL (ref 12.5–16.5)
HEMOGLOBIN: 11.9 G/DL (ref 12.5–16.5)
HEMOGLOBIN: 12.2 G/DL (ref 12.5–16.5)
HEMOGLOBIN: 13.6 G/DL (ref 12.5–16.5)
HEMOGLOBIN: 13.9 G/DL (ref 12.5–16.5)
HEPATITIS B CORE IGM ANTIBODY: NORMAL
HEPATITIS B SURFACE ANTIGEN INTERPRETATION: NORMAL
HEPATITIS C ANTIBODY INTERPRETATION: NORMAL
IMMATURE GRANULOCYTES #: 0.02 E9/L
IMMATURE GRANULOCYTES #: 0.03 E9/L
IMMATURE GRANULOCYTES #: 0.04 E9/L
IMMATURE GRANULOCYTES #: 0.05 E9/L
IMMATURE GRANULOCYTES #: 0.06 E9/L
IMMATURE GRANULOCYTES #: 0.09 E9/L
IMMATURE GRANULOCYTES %: 0.2 % (ref 0–5)
IMMATURE GRANULOCYTES %: 0.3 % (ref 0–5)
IMMATURE GRANULOCYTES %: 0.4 % (ref 0–5)
IMMATURE GRANULOCYTES %: 0.6 % (ref 0–5)
IMMATURE GRANULOCYTES %: 0.6 % (ref 0–5)
IMMATURE GRANULOCYTES %: 0.8 % (ref 0–5)
INR BLD: 1.7
INR BLD: 1.8
INR BLD: 3.4
KETONES, URINE: NEGATIVE MG/DL
LACTIC ACID: 3.3 MMOL/L (ref 0.5–2.2)
LACTIC ACID: 5.2 MMOL/L (ref 0.5–2.2)
LACTIC ACID: 6.8 MMOL/L (ref 0.5–2.2)
LACTIC ACID: 7.9 MMOL/L (ref 0.5–2.2)
LDL CHOLESTEROL CALCULATED: 78 MG/DL (ref 0–99)
LEUKOCYTE ESTERASE, URINE: ABNORMAL
LEUKOCYTE ESTERASE, URINE: ABNORMAL
LEUKOCYTE ESTERASE, URINE: NEGATIVE
LIPASE: 15 U/L (ref 13–60)
LV EF: 23 %
LVEF MODALITY: NORMAL
LYMPHOCYTES ABSOLUTE: 1.15 E9/L (ref 1.5–4)
LYMPHOCYTES ABSOLUTE: 1.24 E9/L (ref 1.5–4)
LYMPHOCYTES ABSOLUTE: 1.36 E9/L (ref 1.5–4)
LYMPHOCYTES ABSOLUTE: 1.74 E9/L (ref 1.5–4)
LYMPHOCYTES ABSOLUTE: 2.11 E9/L (ref 1.5–4)
LYMPHOCYTES ABSOLUTE: 2.32 E9/L (ref 1.5–4)
LYMPHOCYTES RELATIVE PERCENT: 12.4 % (ref 20–42)
LYMPHOCYTES RELATIVE PERCENT: 13.9 % (ref 20–42)
LYMPHOCYTES RELATIVE PERCENT: 21.3 % (ref 20–42)
LYMPHOCYTES RELATIVE PERCENT: 22 % (ref 20–42)
LYMPHOCYTES RELATIVE PERCENT: 23.2 % (ref 20–42)
LYMPHOCYTES RELATIVE PERCENT: 9.8 % (ref 20–42)
MAGNESIUM: 1.8 MG/DL (ref 1.6–2.6)
MAGNESIUM: 1.9 MG/DL (ref 1.6–2.6)
MAGNESIUM: 2.1 MG/DL (ref 1.6–2.6)
MCH RBC QN AUTO: 28.4 PG (ref 26–35)
MCH RBC QN AUTO: 28.5 PG (ref 26–35)
MCH RBC QN AUTO: 28.7 PG (ref 26–35)
MCH RBC QN AUTO: 28.7 PG (ref 26–35)
MCH RBC QN AUTO: 28.9 PG (ref 26–35)
MCH RBC QN AUTO: 29 PG (ref 26–35)
MCH RBC QN AUTO: 29 PG (ref 26–35)
MCH RBC QN AUTO: 29.2 PG (ref 26–35)
MCH RBC QN AUTO: 29.4 PG (ref 26–35)
MCHC RBC AUTO-ENTMCNC: 29.4 % (ref 32–34.5)
MCHC RBC AUTO-ENTMCNC: 29.6 % (ref 32–34.5)
MCHC RBC AUTO-ENTMCNC: 29.8 % (ref 32–34.5)
MCHC RBC AUTO-ENTMCNC: 30 % (ref 32–34.5)
MCHC RBC AUTO-ENTMCNC: 30.2 % (ref 32–34.5)
MCHC RBC AUTO-ENTMCNC: 30.4 % (ref 32–34.5)
MCHC RBC AUTO-ENTMCNC: 30.4 % (ref 32–34.5)
MCHC RBC AUTO-ENTMCNC: 30.6 % (ref 32–34.5)
MCHC RBC AUTO-ENTMCNC: 30.6 % (ref 32–34.5)
MCV RBC AUTO: 93.3 FL (ref 80–99.9)
MCV RBC AUTO: 94.9 FL (ref 80–99.9)
MCV RBC AUTO: 95.8 FL (ref 80–99.9)
MCV RBC AUTO: 95.8 FL (ref 80–99.9)
MCV RBC AUTO: 95.9 FL (ref 80–99.9)
MCV RBC AUTO: 96.2 FL (ref 80–99.9)
MCV RBC AUTO: 96.4 FL (ref 80–99.9)
MCV RBC AUTO: 96.4 FL (ref 80–99.9)
MCV RBC AUTO: 97.7 FL (ref 80–99.9)
METER GLUCOSE: 106 MG/DL (ref 74–99)
METER GLUCOSE: 110 MG/DL (ref 74–99)
METER GLUCOSE: 111 MG/DL (ref 74–99)
METER GLUCOSE: 113 MG/DL (ref 74–99)
METER GLUCOSE: 114 MG/DL (ref 74–99)
METER GLUCOSE: 115 MG/DL (ref 74–99)
METER GLUCOSE: 115 MG/DL (ref 74–99)
METER GLUCOSE: 119 MG/DL (ref 74–99)
METER GLUCOSE: 124 MG/DL (ref 74–99)
METER GLUCOSE: 126 MG/DL (ref 74–99)
METER GLUCOSE: 132 MG/DL (ref 74–99)
METER GLUCOSE: 148 MG/DL (ref 74–99)
METER GLUCOSE: 89 MG/DL (ref 74–99)
METER GLUCOSE: 94 MG/DL (ref 74–99)
METER GLUCOSE: 96 MG/DL (ref 74–99)
METER GLUCOSE: 98 MG/DL (ref 74–99)
MICROALBUMIN UR-MCNC: 1136.2 MG/L
MICROALBUMIN UR-MCNC: 1284.4 MG/L
MICROALBUMIN/CREAT UR-RTO: 772.9 (ref 0–30)
MICROALBUMIN/CREAT UR-RTO: 917.4 (ref 0–30)
MONOCYTES ABSOLUTE: 0.78 E9/L (ref 0.1–0.95)
MONOCYTES ABSOLUTE: 0.81 E9/L (ref 0.1–0.95)
MONOCYTES ABSOLUTE: 0.85 E9/L (ref 0.1–0.95)
MONOCYTES ABSOLUTE: 0.89 E9/L (ref 0.1–0.95)
MONOCYTES ABSOLUTE: 0.91 E9/L (ref 0.1–0.95)
MONOCYTES ABSOLUTE: 1.03 E9/L (ref 0.1–0.95)
MONOCYTES RELATIVE PERCENT: 10.3 % (ref 2–12)
MONOCYTES RELATIVE PERCENT: 6.9 % (ref 2–12)
MONOCYTES RELATIVE PERCENT: 8.6 % (ref 2–12)
MONOCYTES RELATIVE PERCENT: 9.1 % (ref 2–12)
MONOCYTES RELATIVE PERCENT: 9.4 % (ref 2–12)
MONOCYTES RELATIVE PERCENT: 9.6 % (ref 2–12)
NEUTROPHILS ABSOLUTE: 5.13 E9/L (ref 1.8–7.3)
NEUTROPHILS ABSOLUTE: 5.24 E9/L (ref 1.8–7.3)
NEUTROPHILS ABSOLUTE: 6.67 E9/L (ref 1.8–7.3)
NEUTROPHILS ABSOLUTE: 7.15 E9/L (ref 1.8–7.3)
NEUTROPHILS ABSOLUTE: 7.2 E9/L (ref 1.8–7.3)
NEUTROPHILS ABSOLUTE: 9.6 E9/L (ref 1.8–7.3)
NEUTROPHILS RELATIVE PERCENT: 57.5 % (ref 43–80)
NEUTROPHILS RELATIVE PERCENT: 62.9 % (ref 43–80)
NEUTROPHILS RELATIVE PERCENT: 63.3 % (ref 43–80)
NEUTROPHILS RELATIVE PERCENT: 72.3 % (ref 43–80)
NEUTROPHILS RELATIVE PERCENT: 73.2 % (ref 43–80)
NEUTROPHILS RELATIVE PERCENT: 82.1 % (ref 43–80)
NITRITE, URINE: NEGATIVE
NITRITE, URINE: NEGATIVE
NITRITE, URINE: POSITIVE
ORGANISM: ABNORMAL
PDW BLD-RTO: 14.6 FL (ref 11.5–15)
PDW BLD-RTO: 15.9 FL (ref 11.5–15)
PDW BLD-RTO: 15.9 FL (ref 11.5–15)
PDW BLD-RTO: 16 FL (ref 11.5–15)
PDW BLD-RTO: 16.1 FL (ref 11.5–15)
PDW BLD-RTO: 16.3 FL (ref 11.5–15)
PDW BLD-RTO: 16.3 FL (ref 11.5–15)
PH UA: 5 (ref 5–9)
PH UA: 6 (ref 5–9)
PH UA: 6 (ref 5–9)
PHOSPHORUS: 5.1 MG/DL (ref 2.5–4.5)
PHOSPHORUS: 6.7 MG/DL (ref 2.5–4.5)
PLATELET # BLD: 208 E9/L (ref 130–450)
PLATELET # BLD: 218 E9/L (ref 130–450)
PLATELET # BLD: 253 E9/L (ref 130–450)
PLATELET # BLD: 255 E9/L (ref 130–450)
PLATELET # BLD: 257 E9/L (ref 130–450)
PLATELET # BLD: 274 E9/L (ref 130–450)
PLATELET # BLD: 291 E9/L (ref 130–450)
PLATELET # BLD: 292 E9/L (ref 130–450)
PLATELET # BLD: 334 E9/L (ref 130–450)
PMV BLD AUTO: 10.3 FL (ref 7–12)
PMV BLD AUTO: 10.7 FL (ref 7–12)
PMV BLD AUTO: 10.7 FL (ref 7–12)
PMV BLD AUTO: 10.8 FL (ref 7–12)
PMV BLD AUTO: 10.8 FL (ref 7–12)
PMV BLD AUTO: 11.1 FL (ref 7–12)
PMV BLD AUTO: 11.2 FL (ref 7–12)
PMV BLD AUTO: 11.3 FL (ref 7–12)
PMV BLD AUTO: 11.6 FL (ref 7–12)
POTASSIUM REFLEX MAGNESIUM: 4.3 MMOL/L (ref 3.5–5)
POTASSIUM REFLEX MAGNESIUM: 4.4 MMOL/L (ref 3.5–5)
POTASSIUM REFLEX MAGNESIUM: 4.6 MMOL/L (ref 3.5–5)
POTASSIUM REFLEX MAGNESIUM: 5.4 MMOL/L (ref 3.5–5)
POTASSIUM REFLEX MAGNESIUM: 7.2 MMOL/L (ref 3.5–5)
POTASSIUM SERPL-SCNC: 3.8 MMOL/L (ref 3.5–5)
POTASSIUM SERPL-SCNC: 4 MMOL/L (ref 3.5–5)
POTASSIUM SERPL-SCNC: 4.1 MMOL/L (ref 3.5–5)
POTASSIUM SERPL-SCNC: 4.2 MMOL/L (ref 3.5–5)
POTASSIUM SERPL-SCNC: 4.2 MMOL/L (ref 3.5–5)
POTASSIUM SERPL-SCNC: 4.3 MMOL/L (ref 3.5–5)
POTASSIUM SERPL-SCNC: 4.3 MMOL/L (ref 3.5–5)
POTASSIUM SERPL-SCNC: 4.4 MMOL/L (ref 3.5–5)
POTASSIUM SERPL-SCNC: 4.5 MMOL/L (ref 3.5–5)
POTASSIUM SERPL-SCNC: 4.9 MMOL/L (ref 3.5–5)
POTASSIUM SERPL-SCNC: 5.3 MMOL/L (ref 3.5–5)
POTASSIUM SERPL-SCNC: 5.5 MMOL/L (ref 3.5–5)
POTASSIUM SERPL-SCNC: 6.1 MMOL/L (ref 3.5–5)
POTASSIUM, UR: 58 MMOL/L
POTASSIUM, UR: 75 MMOL/L
PRO-BNP: 8321 PG/ML (ref 0–125)
PRO-BNP: ABNORMAL PG/ML (ref 0–125)
PROTEIN UA: 100 MG/DL
PROTEIN UA: 100 MG/DL
PROTEIN UA: 30 MG/DL
PROTHROMBIN TIME: 19.5 SEC (ref 9.3–12.4)
PROTHROMBIN TIME: 20 SEC (ref 9.3–12.4)
PROTHROMBIN TIME: 38.9 SEC (ref 9.3–12.4)
RBC # BLD: 3.93 E12/L (ref 3.8–5.8)
RBC # BLD: 3.93 E12/L (ref 3.8–5.8)
RBC # BLD: 3.95 E12/L (ref 3.8–5.8)
RBC # BLD: 4.04 E12/L (ref 3.8–5.8)
RBC # BLD: 4.05 E12/L (ref 3.8–5.8)
RBC # BLD: 4.08 E12/L (ref 3.8–5.8)
RBC # BLD: 4.25 E12/L (ref 3.8–5.8)
RBC # BLD: 4.7 E12/L (ref 3.8–5.8)
RBC # BLD: 4.84 E12/L (ref 3.8–5.8)
RBC UA: >20 /HPF (ref 0–2)
RBC UA: ABNORMAL /HPF (ref 0–2)
RBC UA: ABNORMAL /HPF (ref 0–2)
REASON FOR REJECTION: NORMAL
REJECTED TEST: NORMAL
SODIUM BLD-SCNC: 134 MMOL/L (ref 132–146)
SODIUM BLD-SCNC: 134 MMOL/L (ref 132–146)
SODIUM BLD-SCNC: 136 MMOL/L (ref 132–146)
SODIUM BLD-SCNC: 136 MMOL/L (ref 132–146)
SODIUM BLD-SCNC: 137 MMOL/L (ref 132–146)
SODIUM BLD-SCNC: 137 MMOL/L (ref 132–146)
SODIUM BLD-SCNC: 138 MMOL/L (ref 132–146)
SODIUM BLD-SCNC: 139 MMOL/L (ref 132–146)
SODIUM BLD-SCNC: 140 MMOL/L (ref 132–146)
SODIUM BLD-SCNC: 142 MMOL/L (ref 132–146)
SODIUM BLD-SCNC: 143 MMOL/L (ref 132–146)
SODIUM URINE: 25 MMOL/L
SODIUM URINE: 26 MMOL/L
SODIUM URINE: 30 MMOL/L
SODIUM URINE: 50 MMOL/L
SPECIFIC GRAVITY UA: 1.01 (ref 1–1.03)
SPECIFIC GRAVITY UA: 1.02 (ref 1–1.03)
SPECIFIC GRAVITY UA: >=1.03 (ref 1–1.03)
TOTAL PROTEIN: 7.3 G/DL (ref 6.4–8.3)
TOTAL PROTEIN: 7.7 G/DL (ref 6.4–8.3)
TOTAL PROTEIN: 7.8 G/DL (ref 6.4–8.3)
TOTAL PROTEIN: 8.4 G/DL (ref 6.4–8.3)
TRIGL SERPL-MCNC: 128 MG/DL (ref 0–149)
TROPONIN: 0.01 NG/ML (ref 0–0.03)
TROPONIN: 0.02 NG/ML (ref 0–0.03)
UREA NITROGEN, UR: 439 MG/DL (ref 800–1666)
UREA NITROGEN, UR: 461 MG/DL (ref 800–1666)
URIC ACID, SERUM: 10 MG/DL (ref 3.4–7)
URIC ACID, SERUM: 13.3 MG/DL (ref 3.4–7)
URINE CULTURE, ROUTINE: ABNORMAL
UROBILINOGEN, URINE: 0.2 E.U./DL
VLDLC SERPL CALC-MCNC: 26 MG/DL
WBC # BLD: 10 E9/L (ref 4.5–11.5)
WBC # BLD: 10.5 E9/L (ref 4.5–11.5)
WBC # BLD: 10.5 E9/L (ref 4.5–11.5)
WBC # BLD: 11.7 E9/L (ref 4.5–11.5)
WBC # BLD: 8 E9/L (ref 4.5–11.5)
WBC # BLD: 8.2 E9/L (ref 4.5–11.5)
WBC # BLD: 9.1 E9/L (ref 4.5–11.5)
WBC # BLD: 9.6 E9/L (ref 4.5–11.5)
WBC # BLD: 9.8 E9/L (ref 4.5–11.5)
WBC UA: ABNORMAL /HPF (ref 0–5)

## 2020-01-01 PROCEDURE — 6370000000 HC RX 637 (ALT 250 FOR IP): Performed by: PHYSICIAN ASSISTANT

## 2020-01-01 PROCEDURE — 36415 COLL VENOUS BLD VENIPUNCTURE: CPT

## 2020-01-01 PROCEDURE — 2580000003 HC RX 258: Performed by: PHYSICIAN ASSISTANT

## 2020-01-01 PROCEDURE — 94640 AIRWAY INHALATION TREATMENT: CPT

## 2020-01-01 PROCEDURE — 83735 ASSAY OF MAGNESIUM: CPT

## 2020-01-01 PROCEDURE — 97530 THERAPEUTIC ACTIVITIES: CPT

## 2020-01-01 PROCEDURE — 80048 BASIC METABOLIC PNL TOTAL CA: CPT

## 2020-01-01 PROCEDURE — 51798 US URINE CAPACITY MEASURE: CPT

## 2020-01-01 PROCEDURE — 6370000000 HC RX 637 (ALT 250 FOR IP): Performed by: NURSE PRACTITIONER

## 2020-01-01 PROCEDURE — 2580000003 HC RX 258: Performed by: EMERGENCY MEDICINE

## 2020-01-01 PROCEDURE — 6370000000 HC RX 637 (ALT 250 FOR IP): Performed by: INTERNAL MEDICINE

## 2020-01-01 PROCEDURE — 6370000000 HC RX 637 (ALT 250 FOR IP): Performed by: UROLOGY

## 2020-01-01 PROCEDURE — G0378 HOSPITAL OBSERVATION PER HR: HCPCS

## 2020-01-01 PROCEDURE — 82962 GLUCOSE BLOOD TEST: CPT

## 2020-01-01 PROCEDURE — 76770 US EXAM ABDO BACK WALL COMP: CPT

## 2020-01-01 PROCEDURE — 83690 ASSAY OF LIPASE: CPT

## 2020-01-01 PROCEDURE — 71045 X-RAY EXAM CHEST 1 VIEW: CPT

## 2020-01-01 PROCEDURE — 6360000002 HC RX W HCPCS: Performed by: NURSE PRACTITIONER

## 2020-01-01 PROCEDURE — 84550 ASSAY OF BLOOD/URIC ACID: CPT

## 2020-01-01 PROCEDURE — 93971 EXTREMITY STUDY: CPT

## 2020-01-01 PROCEDURE — 99285 EMERGENCY DEPT VISIT HI MDM: CPT

## 2020-01-01 PROCEDURE — 84484 ASSAY OF TROPONIN QUANT: CPT

## 2020-01-01 PROCEDURE — 83880 ASSAY OF NATRIURETIC PEPTIDE: CPT

## 2020-01-01 PROCEDURE — 87077 CULTURE AEROBIC IDENTIFY: CPT

## 2020-01-01 PROCEDURE — 2500000003 HC RX 250 WO HCPCS: Performed by: INTERNAL MEDICINE

## 2020-01-01 PROCEDURE — 93010 ELECTROCARDIOGRAM REPORT: CPT | Performed by: INTERNAL MEDICINE

## 2020-01-01 PROCEDURE — 84132 ASSAY OF SERUM POTASSIUM: CPT

## 2020-01-01 PROCEDURE — 85025 COMPLETE CBC W/AUTO DIFF WBC: CPT

## 2020-01-01 PROCEDURE — 2500000003 HC RX 250 WO HCPCS: Performed by: PHYSICIAN ASSISTANT

## 2020-01-01 PROCEDURE — 6360000004 HC RX CONTRAST MEDICATION: Performed by: INTERNAL MEDICINE

## 2020-01-01 PROCEDURE — 6360000002 HC RX W HCPCS: Performed by: INTERNAL MEDICINE

## 2020-01-01 PROCEDURE — 85610 PROTHROMBIN TIME: CPT

## 2020-01-01 PROCEDURE — 99213 OFFICE O/P EST LOW 20 MIN: CPT | Performed by: SURGERY

## 2020-01-01 PROCEDURE — 51702 INSERT TEMP BLADDER CATH: CPT

## 2020-01-01 PROCEDURE — 93005 ELECTROCARDIOGRAM TRACING: CPT | Performed by: STUDENT IN AN ORGANIZED HEALTH CARE EDUCATION/TRAINING PROGRAM

## 2020-01-01 PROCEDURE — 85027 COMPLETE CBC AUTOMATED: CPT

## 2020-01-01 PROCEDURE — 74176 CT ABD & PELVIS W/O CONTRAST: CPT

## 2020-01-01 PROCEDURE — 97530 THERAPEUTIC ACTIVITIES: CPT | Performed by: PHYSICAL THERAPIST

## 2020-01-01 PROCEDURE — 86706 HEP B SURFACE ANTIBODY: CPT

## 2020-01-01 PROCEDURE — 99222 1ST HOSP IP/OBS MODERATE 55: CPT | Performed by: SURGERY

## 2020-01-01 PROCEDURE — 84540 ASSAY OF URINE/UREA-N: CPT

## 2020-01-01 PROCEDURE — 6360000002 HC RX W HCPCS: Performed by: PHYSICIAN ASSISTANT

## 2020-01-01 PROCEDURE — 84100 ASSAY OF PHOSPHORUS: CPT

## 2020-01-01 PROCEDURE — 97165 OT EVAL LOW COMPLEX 30 MIN: CPT

## 2020-01-01 PROCEDURE — 93005 ELECTROCARDIOGRAM TRACING: CPT | Performed by: NURSE PRACTITIONER

## 2020-01-01 PROCEDURE — 2580000003 HC RX 258: Performed by: NURSE PRACTITIONER

## 2020-01-01 PROCEDURE — 97161 PT EVAL LOW COMPLEX 20 MIN: CPT

## 2020-01-01 PROCEDURE — 81001 URINALYSIS AUTO W/SCOPE: CPT

## 2020-01-01 PROCEDURE — 02HV33Z INSERTION OF INFUSION DEVICE INTO SUPERIOR VENA CAVA, PERCUTANEOUS APPROACH: ICD-10-PCS | Performed by: SURGERY

## 2020-01-01 PROCEDURE — 2500000003 HC RX 250 WO HCPCS: Performed by: EMERGENCY MEDICINE

## 2020-01-01 PROCEDURE — 2140000000 HC CCU INTERMEDIATE R&B

## 2020-01-01 PROCEDURE — 2580000003 HC RX 258: Performed by: INTERNAL MEDICINE

## 2020-01-01 PROCEDURE — 97535 SELF CARE MNGMENT TRAINING: CPT

## 2020-01-01 PROCEDURE — 82570 ASSAY OF URINE CREATININE: CPT

## 2020-01-01 PROCEDURE — 83605 ASSAY OF LACTIC ACID: CPT

## 2020-01-01 PROCEDURE — 80053 COMPREHEN METABOLIC PANEL: CPT

## 2020-01-01 PROCEDURE — 84300 ASSAY OF URINE SODIUM: CPT

## 2020-01-01 PROCEDURE — 51700 IRRIGATION OF BLADDER: CPT

## 2020-01-01 PROCEDURE — 80061 LIPID PANEL: CPT

## 2020-01-01 PROCEDURE — 6370000000 HC RX 637 (ALT 250 FOR IP): Performed by: STUDENT IN AN ORGANIZED HEALTH CARE EDUCATION/TRAINING PROGRAM

## 2020-01-01 PROCEDURE — 90935 HEMODIALYSIS ONE EVALUATION: CPT

## 2020-01-01 PROCEDURE — 96374 THER/PROPH/DIAG INJ IV PUSH: CPT

## 2020-01-01 PROCEDURE — 82044 UR ALBUMIN SEMIQUANTITATIVE: CPT

## 2020-01-01 PROCEDURE — 82436 ASSAY OF URINE CHLORIDE: CPT

## 2020-01-01 PROCEDURE — 94761 N-INVAS EAR/PLS OXIMETRY MLT: CPT

## 2020-01-01 PROCEDURE — 6370000000 HC RX 637 (ALT 250 FOR IP): Performed by: EMERGENCY MEDICINE

## 2020-01-01 PROCEDURE — 2700000000 HC OXYGEN THERAPY PER DAY

## 2020-01-01 PROCEDURE — 5A1D70Z PERFORMANCE OF URINARY FILTRATION, INTERMITTENT, LESS THAN 6 HOURS PER DAY: ICD-10-PCS | Performed by: INTERNAL MEDICINE

## 2020-01-01 PROCEDURE — 97161 PT EVAL LOW COMPLEX 20 MIN: CPT | Performed by: PHYSICAL THERAPIST

## 2020-01-01 PROCEDURE — 93306 TTE W/DOPPLER COMPLETE: CPT

## 2020-01-01 PROCEDURE — 96365 THER/PROPH/DIAG IV INF INIT: CPT

## 2020-01-01 PROCEDURE — 02H633Z INSERTION OF INFUSION DEVICE INTO RIGHT ATRIUM, PERCUTANEOUS APPROACH: ICD-10-PCS | Performed by: SURGERY

## 2020-01-01 PROCEDURE — 96376 TX/PRO/DX INJ SAME DRUG ADON: CPT

## 2020-01-01 PROCEDURE — 84133 ASSAY OF URINE POTASSIUM: CPT

## 2020-01-01 PROCEDURE — 99283 EMERGENCY DEPT VISIT LOW MDM: CPT

## 2020-01-01 PROCEDURE — 96375 TX/PRO/DX INJ NEW DRUG ADDON: CPT

## 2020-01-01 PROCEDURE — 6360000002 HC RX W HCPCS: Performed by: EMERGENCY MEDICINE

## 2020-01-01 PROCEDURE — 80074 ACUTE HEPATITIS PANEL: CPT

## 2020-01-01 PROCEDURE — 99203 OFFICE O/P NEW LOW 30 MIN: CPT | Performed by: SURGERY

## 2020-01-01 PROCEDURE — 99291 CRITICAL CARE FIRST HOUR: CPT

## 2020-01-01 PROCEDURE — 9900000074 HC THERAPEUTIC ACTIVITIES PER 15 MIN (SELF-PAY)

## 2020-01-01 PROCEDURE — 90935 HEMODIALYSIS ONE EVALUATION: CPT | Performed by: INTERNAL MEDICINE

## 2020-01-01 PROCEDURE — 93005 ELECTROCARDIOGRAM TRACING: CPT | Performed by: EMERGENCY MEDICINE

## 2020-01-01 PROCEDURE — 87040 BLOOD CULTURE FOR BACTERIA: CPT

## 2020-01-01 PROCEDURE — 99284 EMERGENCY DEPT VISIT MOD MDM: CPT

## 2020-01-01 PROCEDURE — 36556 INSERT NON-TUNNEL CV CATH: CPT | Performed by: SURGERY

## 2020-01-01 PROCEDURE — 87186 SC STD MICRODIL/AGAR DIL: CPT

## 2020-01-01 PROCEDURE — 87088 URINE BACTERIA CULTURE: CPT

## 2020-01-01 RX ORDER — ACETAMINOPHEN 325 MG/1
650 TABLET ORAL EVERY 4 HOURS PRN
Status: DISCONTINUED | OUTPATIENT
Start: 2020-01-01 | End: 2020-01-01 | Stop reason: HOSPADM

## 2020-01-01 RX ORDER — BUPROPION HYDROCHLORIDE 150 MG/1
150 TABLET ORAL EVERY MORNING
Status: DISCONTINUED | OUTPATIENT
Start: 2020-01-01 | End: 2020-01-01 | Stop reason: HOSPADM

## 2020-01-01 RX ORDER — OXYCODONE HYDROCHLORIDE AND ACETAMINOPHEN 5; 325 MG/1; MG/1
2 TABLET ORAL EVERY 4 HOURS PRN
Status: DISCONTINUED | OUTPATIENT
Start: 2020-01-01 | End: 2020-01-01 | Stop reason: HOSPADM

## 2020-01-01 RX ORDER — SODIUM CHLORIDE 0.9 % (FLUSH) 0.9 %
10 SYRINGE (ML) INJECTION EVERY 12 HOURS SCHEDULED
Status: DISCONTINUED | OUTPATIENT
Start: 2020-01-01 | End: 2020-01-01 | Stop reason: HOSPADM

## 2020-01-01 RX ORDER — ISOSORBIDE MONONITRATE 30 MG/1
30 TABLET, EXTENDED RELEASE ORAL DAILY
Status: DISCONTINUED | OUTPATIENT
Start: 2020-01-01 | End: 2020-01-01 | Stop reason: HOSPADM

## 2020-01-01 RX ORDER — POLYETHYLENE GLYCOL 3350 17 G/17G
17 POWDER, FOR SOLUTION ORAL DAILY PRN
Status: DISCONTINUED | OUTPATIENT
Start: 2020-01-01 | End: 2020-01-01 | Stop reason: HOSPADM

## 2020-01-01 RX ORDER — BUMETANIDE 0.25 MG/ML
1 INJECTION, SOLUTION INTRAMUSCULAR; INTRAVENOUS 2 TIMES DAILY
Status: DISCONTINUED | OUTPATIENT
Start: 2020-01-01 | End: 2020-01-01 | Stop reason: HOSPADM

## 2020-01-01 RX ORDER — ASPIRIN 81 MG/1
81 TABLET, CHEWABLE ORAL DAILY
Qty: 30 TABLET | Refills: 3 | Status: SHIPPED | OUTPATIENT
Start: 2020-01-01

## 2020-01-01 RX ORDER — CEFDINIR 300 MG/1
300 CAPSULE ORAL ONCE
Status: COMPLETED | OUTPATIENT
Start: 2020-01-01 | End: 2020-01-01

## 2020-01-01 RX ORDER — DEXTROSE MONOHYDRATE 25 G/50ML
25 INJECTION, SOLUTION INTRAVENOUS ONCE
Status: COMPLETED | OUTPATIENT
Start: 2020-01-01 | End: 2020-01-01

## 2020-01-01 RX ORDER — ISOSORBIDE MONONITRATE 60 MG/1
30 TABLET, EXTENDED RELEASE ORAL DAILY
Status: ON HOLD | COMMUNITY
End: 2020-01-01 | Stop reason: SDUPTHER

## 2020-01-01 RX ORDER — POLYETHYLENE GLYCOL 3350 17 G/17G
17 POWDER, FOR SOLUTION ORAL DAILY PRN
COMMUNITY

## 2020-01-01 RX ORDER — SODIUM POLYSTYRENE SULFONATE 15 G/60ML
15 SUSPENSION ORAL; RECTAL ONCE
Status: COMPLETED | OUTPATIENT
Start: 2020-01-01 | End: 2020-01-01

## 2020-01-01 RX ORDER — METOPROLOL SUCCINATE 50 MG/1
50 TABLET, EXTENDED RELEASE ORAL DAILY
Status: DISCONTINUED | OUTPATIENT
Start: 2020-01-01 | End: 2020-01-01 | Stop reason: HOSPADM

## 2020-01-01 RX ORDER — ONDANSETRON 2 MG/ML
4 INJECTION INTRAMUSCULAR; INTRAVENOUS EVERY 6 HOURS PRN
Status: DISCONTINUED | OUTPATIENT
Start: 2020-01-01 | End: 2020-01-01 | Stop reason: HOSPADM

## 2020-01-01 RX ORDER — CHOLECALCIFEROL (VITAMIN D3) 125 MCG
5 CAPSULE ORAL DAILY
Status: DISCONTINUED | OUTPATIENT
Start: 2020-01-01 | End: 2020-01-01 | Stop reason: HOSPADM

## 2020-01-01 RX ORDER — DEXTROSE MONOHYDRATE 25 G/50ML
12.5 INJECTION, SOLUTION INTRAVENOUS PRN
Status: DISCONTINUED | OUTPATIENT
Start: 2020-01-01 | End: 2020-01-01 | Stop reason: HOSPADM

## 2020-01-01 RX ORDER — COLCHICINE 0.6 MG/1
0.6 TABLET ORAL DAILY
Status: DISCONTINUED | OUTPATIENT
Start: 2020-01-01 | End: 2020-01-01 | Stop reason: HOSPADM

## 2020-01-01 RX ORDER — COLCHICINE 0.6 MG/1
1.2 TABLET ORAL ONCE
Status: COMPLETED | OUTPATIENT
Start: 2020-01-01 | End: 2020-01-01

## 2020-01-01 RX ORDER — HYDROXYZINE PAMOATE 25 MG/1
25 CAPSULE ORAL ONCE
Status: COMPLETED | OUTPATIENT
Start: 2020-01-01 | End: 2020-01-01

## 2020-01-01 RX ORDER — ATROPA BELLADONNA AND OPIUM 16.2; 6 MG/1; MG/1
60 SUPPOSITORY RECTAL EVERY 8 HOURS PRN
Qty: 5 SUPPOSITORY | Refills: 0 | Status: ON HOLD | OUTPATIENT
Start: 2020-01-01 | End: 2020-01-01 | Stop reason: HOSPADM

## 2020-01-01 RX ORDER — MORPHINE SULFATE 4 MG/ML
4 INJECTION, SOLUTION INTRAMUSCULAR; INTRAVENOUS ONCE
Status: COMPLETED | OUTPATIENT
Start: 2020-01-01 | End: 2020-01-01

## 2020-01-01 RX ORDER — ALLOPURINOL 100 MG/1
100 TABLET ORAL DAILY
Status: DISCONTINUED | OUTPATIENT
Start: 2020-01-01 | End: 2020-01-01 | Stop reason: HOSPADM

## 2020-01-01 RX ORDER — METOPROLOL SUCCINATE 50 MG/1
50 TABLET, EXTENDED RELEASE ORAL DAILY
Qty: 30 TABLET | Refills: 0 | Status: SHIPPED | OUTPATIENT
Start: 2020-01-01

## 2020-01-01 RX ORDER — SODIUM CHLORIDE 0.9 % (FLUSH) 0.9 %
10 SYRINGE (ML) INJECTION PRN
Status: DISCONTINUED | OUTPATIENT
Start: 2020-01-01 | End: 2020-01-01 | Stop reason: HOSPADM

## 2020-01-01 RX ORDER — ATROPA BELLADONNA AND OPIUM 16.2; 6 MG/1; MG/1
60 SUPPOSITORY RECTAL EVERY 8 HOURS PRN
Status: DISCONTINUED | OUTPATIENT
Start: 2020-01-01 | End: 2020-01-01 | Stop reason: SDUPTHER

## 2020-01-01 RX ORDER — DOCUSATE SODIUM 100 MG/1
100 CAPSULE, LIQUID FILLED ORAL 2 TIMES DAILY
Qty: 60 CAPSULE | Refills: 0 | Status: SHIPPED | OUTPATIENT
Start: 2020-01-01 | End: 2020-01-01

## 2020-01-01 RX ORDER — COLCHICINE 0.6 MG/1
0.6 TABLET ORAL ONCE
Status: DISCONTINUED | OUTPATIENT
Start: 2020-01-01 | End: 2020-01-01 | Stop reason: HOSPADM

## 2020-01-01 RX ORDER — LEVOTHYROXINE SODIUM 0.03 MG/1
25 TABLET ORAL DAILY
Status: DISCONTINUED | OUTPATIENT
Start: 2020-01-01 | End: 2020-01-01 | Stop reason: HOSPADM

## 2020-01-01 RX ORDER — ASPIRIN 81 MG/1
81 TABLET, CHEWABLE ORAL DAILY
Status: DISCONTINUED | OUTPATIENT
Start: 2020-01-01 | End: 2020-01-01 | Stop reason: HOSPADM

## 2020-01-01 RX ORDER — ASPIRIN 81 MG/1
325 TABLET, CHEWABLE ORAL ONCE
Status: COMPLETED | OUTPATIENT
Start: 2020-01-01 | End: 2020-01-01

## 2020-01-01 RX ORDER — HYDRALAZINE HYDROCHLORIDE 10 MG/1
10 TABLET, FILM COATED ORAL 3 TIMES DAILY
Qty: 90 TABLET | Refills: 0 | Status: SHIPPED | OUTPATIENT
Start: 2020-01-01

## 2020-01-01 RX ORDER — METOPROLOL SUCCINATE 25 MG/1
25 TABLET, EXTENDED RELEASE ORAL 2 TIMES DAILY
Status: ON HOLD | COMMUNITY
End: 2020-01-01 | Stop reason: HOSPADM

## 2020-01-01 RX ORDER — ISOSORBIDE MONONITRATE 60 MG/1
30 TABLET, EXTENDED RELEASE ORAL DAILY
Qty: 30 TABLET | Refills: 3
Start: 2020-01-01

## 2020-01-01 RX ORDER — 0.9 % SODIUM CHLORIDE 0.9 %
1000 INTRAVENOUS SOLUTION INTRAVENOUS ONCE
Status: COMPLETED | OUTPATIENT
Start: 2020-01-01 | End: 2020-01-01

## 2020-01-01 RX ORDER — FUROSEMIDE 10 MG/ML
40 INJECTION INTRAMUSCULAR; INTRAVENOUS ONCE
Status: COMPLETED | OUTPATIENT
Start: 2020-01-01 | End: 2020-01-01

## 2020-01-01 RX ORDER — AMIODARONE HYDROCHLORIDE 200 MG/1
200 TABLET ORAL 2 TIMES DAILY
Status: DISCONTINUED | OUTPATIENT
Start: 2020-01-01 | End: 2020-01-01

## 2020-01-01 RX ORDER — BUMETANIDE 1 MG/1
1 TABLET ORAL 2 TIMES DAILY
Qty: 30 TABLET | Refills: 3 | Status: SHIPPED | OUTPATIENT
Start: 2020-01-01 | End: 2020-01-01 | Stop reason: ALTCHOICE

## 2020-01-01 RX ORDER — TRAZODONE HYDROCHLORIDE 50 MG/1
100 TABLET ORAL NIGHTLY PRN
Status: DISCONTINUED | OUTPATIENT
Start: 2020-01-01 | End: 2020-01-01 | Stop reason: HOSPADM

## 2020-01-01 RX ORDER — TRAZODONE HYDROCHLORIDE 100 MG/1
100 TABLET ORAL NIGHTLY PRN
Qty: 30 TABLET | Refills: 0 | Status: SHIPPED | OUTPATIENT
Start: 2020-01-01

## 2020-01-01 RX ORDER — BUMETANIDE 0.25 MG/ML
1 INJECTION, SOLUTION INTRAMUSCULAR; INTRAVENOUS 2 TIMES DAILY
Status: DISCONTINUED | OUTPATIENT
Start: 2020-01-01 | End: 2020-01-01

## 2020-01-01 RX ORDER — KETOCONAZOLE 20 MG/G
CREAM TOPICAL 2 TIMES DAILY
COMMUNITY

## 2020-01-01 RX ORDER — ALLOPURINOL 100 MG/1
100 TABLET ORAL DAILY
Qty: 30 TABLET | Refills: 0 | Status: SHIPPED | OUTPATIENT
Start: 2020-01-01

## 2020-01-01 RX ORDER — ONDANSETRON 2 MG/ML
4 INJECTION INTRAMUSCULAR; INTRAVENOUS ONCE
Status: COMPLETED | OUTPATIENT
Start: 2020-01-01 | End: 2020-01-01

## 2020-01-01 RX ORDER — CHOLECALCIFEROL (VITAMIN D3) 125 MCG
5 CAPSULE ORAL NIGHTLY
Status: DISCONTINUED | OUTPATIENT
Start: 2020-01-01 | End: 2020-01-01 | Stop reason: HOSPADM

## 2020-01-01 RX ORDER — CEFDINIR 300 MG/1
300 CAPSULE ORAL 2 TIMES DAILY
Qty: 20 CAPSULE | Refills: 0 | Status: SHIPPED | OUTPATIENT
Start: 2020-01-01 | End: 2020-01-01

## 2020-01-01 RX ORDER — QUETIAPINE FUMARATE 25 MG/1
25 TABLET, FILM COATED ORAL NIGHTLY
Status: DISCONTINUED | OUTPATIENT
Start: 2020-01-01 | End: 2020-01-01 | Stop reason: ALTCHOICE

## 2020-01-01 RX ORDER — ONDANSETRON 2 MG/ML
4 INJECTION INTRAMUSCULAR; INTRAVENOUS EVERY 6 HOURS PRN
Status: DISCONTINUED | OUTPATIENT
Start: 2020-01-01 | End: 2020-01-01

## 2020-01-01 RX ORDER — ACETAMINOPHEN 500 MG
1000 TABLET ORAL EVERY 6 HOURS PRN
Qty: 120 TABLET | Refills: 3 | Status: SHIPPED | OUTPATIENT
Start: 2020-01-01 | End: 2020-01-01 | Stop reason: ALTCHOICE

## 2020-01-01 RX ORDER — MORPHINE SULFATE 2 MG/ML
2 INJECTION, SOLUTION INTRAMUSCULAR; INTRAVENOUS
Status: COMPLETED | OUTPATIENT
Start: 2020-01-01 | End: 2020-01-01

## 2020-01-01 RX ORDER — OXYCODONE HYDROCHLORIDE AND ACETAMINOPHEN 5; 325 MG/1; MG/1
1 TABLET ORAL ONCE
Status: COMPLETED | OUTPATIENT
Start: 2020-01-01 | End: 2020-01-01

## 2020-01-01 RX ORDER — CALCIUM CARBONATE 200(500)MG
1000 TABLET,CHEWABLE ORAL 3 TIMES DAILY PRN
Status: DISCONTINUED | OUTPATIENT
Start: 2020-01-01 | End: 2020-01-01 | Stop reason: HOSPADM

## 2020-01-01 RX ORDER — COLCHICINE 0.6 MG/1
0.6 TABLET ORAL DAILY
Qty: 10 TABLET | Refills: 0 | Status: SHIPPED | OUTPATIENT
Start: 2020-01-01 | End: 2020-01-01

## 2020-01-01 RX ORDER — 0.9 % SODIUM CHLORIDE 0.9 %
30 INTRAVENOUS SOLUTION INTRAVENOUS ONCE
Status: DISCONTINUED | OUTPATIENT
Start: 2020-01-01 | End: 2020-01-01

## 2020-01-01 RX ORDER — COLCHICINE 0.6 MG/1
TABLET ORAL
Qty: 10 TABLET | Refills: 0 | Status: ON HOLD | OUTPATIENT
Start: 2020-01-01 | End: 2020-01-01 | Stop reason: HOSPADM

## 2020-01-01 RX ORDER — ISOSORBIDE MONONITRATE 60 MG/1
60 TABLET, EXTENDED RELEASE ORAL DAILY
Status: DISCONTINUED | OUTPATIENT
Start: 2020-01-01 | End: 2020-01-01 | Stop reason: HOSPADM

## 2020-01-01 RX ORDER — SODIUM CHLORIDE 9 MG/ML
INJECTION, SOLUTION INTRAVENOUS CONTINUOUS
Status: DISCONTINUED | OUTPATIENT
Start: 2020-01-01 | End: 2020-01-01 | Stop reason: HOSPADM

## 2020-01-01 RX ORDER — IPRATROPIUM BROMIDE AND ALBUTEROL SULFATE 2.5; .5 MG/3ML; MG/3ML
1 SOLUTION RESPIRATORY (INHALATION)
Status: DISCONTINUED | OUTPATIENT
Start: 2020-01-01 | End: 2020-01-01 | Stop reason: HOSPADM

## 2020-01-01 RX ORDER — POLYETHYLENE GLYCOL 3350 17 G/17G
17 POWDER, FOR SOLUTION ORAL DAILY PRN
COMMUNITY
End: 2020-01-01 | Stop reason: ALTCHOICE

## 2020-01-01 RX ORDER — CEFDINIR 300 MG/1
300 CAPSULE ORAL 2 TIMES DAILY
Status: DISCONTINUED | OUTPATIENT
Start: 2020-01-01 | End: 2020-01-01 | Stop reason: HOSPADM

## 2020-01-01 RX ORDER — NICOTINE POLACRILEX 4 MG
15 LOZENGE BUCCAL PRN
Status: DISCONTINUED | OUTPATIENT
Start: 2020-01-01 | End: 2020-01-01 | Stop reason: HOSPADM

## 2020-01-01 RX ORDER — TRAZODONE HYDROCHLORIDE 50 MG/1
50 TABLET ORAL NIGHTLY PRN
Status: DISCONTINUED | OUTPATIENT
Start: 2020-01-01 | End: 2020-01-01 | Stop reason: HOSPADM

## 2020-01-01 RX ORDER — TIZANIDINE 4 MG/1
4 TABLET ORAL EVERY 8 HOURS PRN
Status: DISCONTINUED | OUTPATIENT
Start: 2020-01-01 | End: 2020-01-01 | Stop reason: HOSPADM

## 2020-01-01 RX ORDER — METOPROLOL TARTRATE 50 MG/1
50 TABLET, FILM COATED ORAL 2 TIMES DAILY
Status: DISCONTINUED | OUTPATIENT
Start: 2020-01-01 | End: 2020-01-01 | Stop reason: ALTCHOICE

## 2020-01-01 RX ORDER — AMIODARONE HYDROCHLORIDE 200 MG/1
200 TABLET ORAL DAILY
Status: DISCONTINUED | OUTPATIENT
Start: 2020-01-01 | End: 2020-01-01 | Stop reason: HOSPADM

## 2020-01-01 RX ORDER — SODIUM CHLORIDE 9 MG/ML
INJECTION, SOLUTION INTRAVENOUS CONTINUOUS
Status: ACTIVE | OUTPATIENT
Start: 2020-01-01 | End: 2020-01-01

## 2020-01-01 RX ORDER — OXYCODONE HYDROCHLORIDE AND ACETAMINOPHEN 5; 325 MG/1; MG/1
1 TABLET ORAL EVERY 4 HOURS PRN
Status: DISCONTINUED | OUTPATIENT
Start: 2020-01-01 | End: 2020-01-01 | Stop reason: HOSPADM

## 2020-01-01 RX ORDER — METOPROLOL TARTRATE 50 MG/1
50 TABLET, FILM COATED ORAL 2 TIMES DAILY
Status: DISCONTINUED | OUTPATIENT
Start: 2020-01-01 | End: 2020-01-01 | Stop reason: HOSPADM

## 2020-01-01 RX ORDER — CHOLECALCIFEROL (VITAMIN D3) 125 MCG
5 CAPSULE ORAL DAILY
Status: ON HOLD | COMMUNITY
End: 2020-01-01

## 2020-01-01 RX ORDER — CHOLECALCIFEROL (VITAMIN D3) 125 MCG
5 CAPSULE ORAL DAILY
Status: DISCONTINUED | OUTPATIENT
Start: 2020-01-01 | End: 2020-01-01

## 2020-01-01 RX ORDER — SODIUM CHLORIDE 9 MG/ML
1000 INJECTION, SOLUTION INTRAVENOUS CONTINUOUS
Status: DISCONTINUED | OUTPATIENT
Start: 2020-01-01 | End: 2020-01-01

## 2020-01-01 RX ORDER — BUMETANIDE 1 MG/1
1 TABLET ORAL 2 TIMES DAILY
Status: DISCONTINUED | OUTPATIENT
Start: 2020-01-01 | End: 2020-01-01 | Stop reason: HOSPADM

## 2020-01-01 RX ORDER — BUMETANIDE 0.25 MG/ML
2 INJECTION, SOLUTION INTRAMUSCULAR; INTRAVENOUS ONCE
Status: COMPLETED | OUTPATIENT
Start: 2020-01-01 | End: 2020-01-01

## 2020-01-01 RX ORDER — PREDNISONE 20 MG/1
40 TABLET ORAL ONCE
Status: COMPLETED | OUTPATIENT
Start: 2020-01-01 | End: 2020-01-01

## 2020-01-01 RX ORDER — BUMETANIDE 1 MG/1
1 TABLET ORAL DAILY
COMMUNITY

## 2020-01-01 RX ORDER — ATROPA BELLADONNA AND OPIUM 16.2; 6 MG/1; MG/1
60 SUPPOSITORY RECTAL EVERY 8 HOURS PRN
Status: DISCONTINUED | OUTPATIENT
Start: 2020-01-01 | End: 2020-01-01 | Stop reason: HOSPADM

## 2020-01-01 RX ORDER — HYDRALAZINE HYDROCHLORIDE 10 MG/1
10 TABLET, FILM COATED ORAL 3 TIMES DAILY
Status: ON HOLD | COMMUNITY
End: 2020-01-01

## 2020-01-01 RX ORDER — BUMETANIDE 1 MG/1
1 TABLET ORAL DAILY
Status: DISCONTINUED | OUTPATIENT
Start: 2020-01-01 | End: 2020-01-01 | Stop reason: HOSPADM

## 2020-01-01 RX ORDER — HYDRALAZINE HYDROCHLORIDE 10 MG/1
10 TABLET, FILM COATED ORAL 3 TIMES DAILY
Status: DISCONTINUED | OUTPATIENT
Start: 2020-01-01 | End: 2020-01-01 | Stop reason: HOSPADM

## 2020-01-01 RX ORDER — NITROGLYCERIN 0.4 MG/1
0.4 TABLET SUBLINGUAL ONCE
Status: DISCONTINUED | OUTPATIENT
Start: 2020-01-01 | End: 2020-01-01 | Stop reason: HOSPADM

## 2020-01-01 RX ORDER — ZOLPIDEM TARTRATE 5 MG/1
5 TABLET ORAL NIGHTLY PRN
Status: DISCONTINUED | OUTPATIENT
Start: 2020-01-01 | End: 2020-01-01 | Stop reason: HOSPADM

## 2020-01-01 RX ORDER — ISOSORBIDE MONONITRATE 60 MG/1
60 TABLET, EXTENDED RELEASE ORAL DAILY
Status: DISCONTINUED | OUTPATIENT
Start: 2020-01-01 | End: 2020-01-01 | Stop reason: DRUGHIGH

## 2020-01-01 RX ORDER — DEXTROSE MONOHYDRATE 50 MG/ML
100 INJECTION, SOLUTION INTRAVENOUS PRN
Status: DISCONTINUED | OUTPATIENT
Start: 2020-01-01 | End: 2020-01-01 | Stop reason: HOSPADM

## 2020-01-01 RX ORDER — TRAZODONE HYDROCHLORIDE 50 MG/1
50 TABLET ORAL NIGHTLY PRN
Qty: 30 TABLET | Refills: 0 | Status: ON HOLD | OUTPATIENT
Start: 2020-01-01 | End: 2020-01-01 | Stop reason: HOSPADM

## 2020-01-01 RX ORDER — SODIUM CHLORIDE 9 MG/ML
INJECTION, SOLUTION INTRAVENOUS CONTINUOUS
Status: DISCONTINUED | OUTPATIENT
Start: 2020-01-01 | End: 2020-01-01

## 2020-01-01 RX ORDER — ATROPA BELLADONNA AND OPIUM 16.2; 6 MG/1; MG/1
60 SUPPOSITORY RECTAL EVERY 8 HOURS PRN
Status: DISCONTINUED | OUTPATIENT
Start: 2020-01-01 | End: 2020-01-01

## 2020-01-01 RX ORDER — CALCIUM GLUCONATE 94 MG/ML
1 INJECTION, SOLUTION INTRAVENOUS ONCE
Status: COMPLETED | OUTPATIENT
Start: 2020-01-01 | End: 2020-01-01

## 2020-01-01 RX ORDER — AMIODARONE HYDROCHLORIDE 200 MG/1
200 TABLET ORAL DAILY
Qty: 30 TABLET | Refills: 0 | Status: SHIPPED | OUTPATIENT
Start: 2020-01-01

## 2020-01-01 RX ADMIN — TRAZODONE HYDROCHLORIDE 50 MG: 50 TABLET ORAL at 23:35

## 2020-01-01 RX ADMIN — APIXABAN 5 MG: 5 TABLET, FILM COATED ORAL at 08:47

## 2020-01-01 RX ADMIN — SODIUM CHLORIDE, PRESERVATIVE FREE 10 ML: 5 INJECTION INTRAVENOUS at 03:16

## 2020-01-01 RX ADMIN — LEVOTHYROXINE SODIUM 25 MCG: 0.03 TABLET ORAL at 06:10

## 2020-01-01 RX ADMIN — AMIODARONE HYDROCHLORIDE 200 MG: 200 TABLET ORAL at 18:45

## 2020-01-01 RX ADMIN — METOPROLOL TARTRATE 50 MG: 50 TABLET, FILM COATED ORAL at 20:06

## 2020-01-01 RX ADMIN — BUPROPION HYDROCHLORIDE 150 MG: 150 TABLET, EXTENDED RELEASE ORAL at 09:19

## 2020-01-01 RX ADMIN — CALCIUM CARBONATE (ANTACID) CHEW TAB 500 MG 1000 MG: 500 CHEW TAB at 00:48

## 2020-01-01 RX ADMIN — SODIUM CHLORIDE: 9 INJECTION, SOLUTION INTRAVENOUS at 03:16

## 2020-01-01 RX ADMIN — METOPROLOL SUCCINATE 50 MG: 50 TABLET, EXTENDED RELEASE ORAL at 18:45

## 2020-01-01 RX ADMIN — ISOSORBIDE MONONITRATE 60 MG: 60 TABLET ORAL at 08:21

## 2020-01-01 RX ADMIN — INSULIN HUMAN 10 UNITS: 100 INJECTION, SOLUTION PARENTERAL at 06:45

## 2020-01-01 RX ADMIN — SODIUM BICARBONATE 50 MEQ: 84 INJECTION, SOLUTION INTRAVENOUS at 06:58

## 2020-01-01 RX ADMIN — BUMETANIDE 1 MG: 1 TABLET ORAL at 18:44

## 2020-01-01 RX ADMIN — APIXABAN 5 MG: 5 TABLET, FILM COATED ORAL at 21:19

## 2020-01-01 RX ADMIN — BUMETANIDE 1 MG: 0.25 INJECTION, SOLUTION INTRAMUSCULAR; INTRAVENOUS at 16:35

## 2020-01-01 RX ADMIN — TIZANIDINE 4 MG: 4 TABLET ORAL at 21:35

## 2020-01-01 RX ADMIN — BUMETANIDE 1.5 MG/HR: 0.25 INJECTION, SOLUTION INTRAMUSCULAR; INTRAVENOUS at 15:14

## 2020-01-01 RX ADMIN — ISOSORBIDE MONONITRATE 60 MG: 60 TABLET ORAL at 10:18

## 2020-01-01 RX ADMIN — ONDANSETRON 4 MG: 2 INJECTION INTRAMUSCULAR; INTRAVENOUS at 17:42

## 2020-01-01 RX ADMIN — LEVOTHYROXINE SODIUM 25 MCG: 25 TABLET ORAL at 06:31

## 2020-01-01 RX ADMIN — IPRATROPIUM BROMIDE AND ALBUTEROL SULFATE 1 AMPULE: 2.5; .5 SOLUTION RESPIRATORY (INHALATION) at 09:10

## 2020-01-01 RX ADMIN — HYDRALAZINE HYDROCHLORIDE 10 MG: 10 TABLET, FILM COATED ORAL at 20:20

## 2020-01-01 RX ADMIN — COLCHICINE 0.6 MG: 0.6 TABLET, FILM COATED ORAL at 09:48

## 2020-01-01 RX ADMIN — CALCIUM GLUCONATE 1 G: 98 INJECTION, SOLUTION INTRAVENOUS at 06:40

## 2020-01-01 RX ADMIN — ZOLPIDEM TARTRATE 5 MG: 5 TABLET ORAL at 22:19

## 2020-01-01 RX ADMIN — HYDRALAZINE HYDROCHLORIDE 10 MG: 10 TABLET, FILM COATED ORAL at 09:48

## 2020-01-01 RX ADMIN — Medication 10 ML: at 21:25

## 2020-01-01 RX ADMIN — LEVOTHYROXINE SODIUM 25 MCG: 25 TABLET ORAL at 06:15

## 2020-01-01 RX ADMIN — LEVOTHYROXINE SODIUM 25 MCG: 0.03 TABLET ORAL at 06:17

## 2020-01-01 RX ADMIN — LEVOTHYROXINE SODIUM 25 MCG: 25 TABLET ORAL at 05:53

## 2020-01-01 RX ADMIN — BUMETANIDE 1.5 MG/HR: 0.25 INJECTION, SOLUTION INTRAMUSCULAR; INTRAVENOUS at 10:16

## 2020-01-01 RX ADMIN — OXYCODONE HYDROCHLORIDE AND ACETAMINOPHEN 2 TABLET: 5; 325 TABLET ORAL at 06:37

## 2020-01-01 RX ADMIN — APIXABAN 5 MG: 5 TABLET, FILM COATED ORAL at 09:49

## 2020-01-01 RX ADMIN — HYDRALAZINE HYDROCHLORIDE 10 MG: 10 TABLET, FILM COATED ORAL at 08:49

## 2020-01-01 RX ADMIN — APIXABAN 5 MG: 5 TABLET, FILM COATED ORAL at 08:20

## 2020-01-01 RX ADMIN — ACETAMINOPHEN 650 MG: 325 TABLET, FILM COATED ORAL at 12:07

## 2020-01-01 RX ADMIN — Medication 5 MG: at 10:27

## 2020-01-01 RX ADMIN — METOPROLOL TARTRATE 50 MG: 50 TABLET, FILM COATED ORAL at 09:48

## 2020-01-01 RX ADMIN — ISOSORBIDE MONONITRATE 60 MG: 60 TABLET ORAL at 08:19

## 2020-01-01 RX ADMIN — APIXABAN 5 MG: 5 TABLET, FILM COATED ORAL at 21:54

## 2020-01-01 RX ADMIN — BUMETANIDE 1.5 MG/HR: 0.25 INJECTION, SOLUTION INTRAMUSCULAR; INTRAVENOUS at 18:57

## 2020-01-01 RX ADMIN — METOPROLOL TARTRATE 50 MG: 50 TABLET, FILM COATED ORAL at 12:08

## 2020-01-01 RX ADMIN — QUETIAPINE FUMARATE 25 MG: 25 TABLET ORAL at 21:09

## 2020-01-01 RX ADMIN — BUMETANIDE 1 MG: 1 TABLET ORAL at 08:19

## 2020-01-01 RX ADMIN — LEVOTHYROXINE SODIUM 25 MCG: 0.03 TABLET ORAL at 18:44

## 2020-01-01 RX ADMIN — CEFDINIR 300 MG: 300 CAPSULE ORAL at 21:03

## 2020-01-01 RX ADMIN — ISOSORBIDE MONONITRATE 30 MG: 30 TABLET ORAL at 09:18

## 2020-01-01 RX ADMIN — METOPROLOL TARTRATE 50 MG: 50 TABLET, FILM COATED ORAL at 22:16

## 2020-01-01 RX ADMIN — CEFDINIR 300 MG: 300 CAPSULE ORAL at 09:48

## 2020-01-01 RX ADMIN — BUPROPION HYDROCHLORIDE 150 MG: 150 TABLET, EXTENDED RELEASE ORAL at 09:18

## 2020-01-01 RX ADMIN — HYDRALAZINE HYDROCHLORIDE 10 MG: 10 TABLET, FILM COATED ORAL at 20:40

## 2020-01-01 RX ADMIN — METOPROLOL TARTRATE 50 MG: 50 TABLET, FILM COATED ORAL at 21:35

## 2020-01-01 RX ADMIN — Medication 10 ML: at 21:22

## 2020-01-01 RX ADMIN — ACETAMINOPHEN 650 MG: 325 TABLET, FILM COATED ORAL at 05:53

## 2020-01-01 RX ADMIN — OXYCODONE HYDROCHLORIDE AND ACETAMINOPHEN 2 TABLET: 5; 325 TABLET ORAL at 21:35

## 2020-01-01 RX ADMIN — BUPROPION HYDROCHLORIDE 150 MG: 150 TABLET, EXTENDED RELEASE ORAL at 08:47

## 2020-01-01 RX ADMIN — PREDNISONE 40 MG: 20 TABLET ORAL at 14:36

## 2020-01-01 RX ADMIN — BUPROPION HYDROCHLORIDE 150 MG: 150 TABLET, EXTENDED RELEASE ORAL at 08:42

## 2020-01-01 RX ADMIN — BUMETANIDE 1 MG: 0.25 INJECTION INTRAMUSCULAR; INTRAVENOUS at 20:39

## 2020-01-01 RX ADMIN — ASPIRIN 81 MG CHEWABLE TABLET 81 MG: 81 TABLET CHEWABLE at 08:47

## 2020-01-01 RX ADMIN — CALCIUM CARBONATE (ANTACID) CHEW TAB 500 MG 1000 MG: 500 CHEW TAB at 02:01

## 2020-01-01 RX ADMIN — METOPROLOL TARTRATE 50 MG: 50 TABLET, FILM COATED ORAL at 09:03

## 2020-01-01 RX ADMIN — DEXTROSE MONOHYDRATE 25 G: 25 INJECTION, SOLUTION INTRAVENOUS at 06:57

## 2020-01-01 RX ADMIN — APIXABAN 5 MG: 5 TABLET, FILM COATED ORAL at 09:05

## 2020-01-01 RX ADMIN — CEFDINIR 300 MG: 300 CAPSULE ORAL at 20:13

## 2020-01-01 RX ADMIN — Medication 5 MG: at 22:18

## 2020-01-01 RX ADMIN — SODIUM CHLORIDE: 9 INJECTION, SOLUTION INTRAVENOUS at 17:38

## 2020-01-01 RX ADMIN — BUMETANIDE 1 MG: 0.25 INJECTION INTRAMUSCULAR; INTRAVENOUS at 20:15

## 2020-01-01 RX ADMIN — ALLOPURINOL 100 MG: 100 TABLET ORAL at 09:46

## 2020-01-01 RX ADMIN — Medication 10 ML: at 08:42

## 2020-01-01 RX ADMIN — CEFDINIR 300 MG: 300 CAPSULE ORAL at 09:47

## 2020-01-01 RX ADMIN — BUMETANIDE 1 MG: 0.25 INJECTION, SOLUTION INTRAMUSCULAR; INTRAVENOUS at 10:18

## 2020-01-01 RX ADMIN — DILTIAZEM HYDROCHLORIDE 25 MG: 5 INJECTION INTRAVENOUS at 05:24

## 2020-01-01 RX ADMIN — APIXABAN 5 MG: 5 TABLET, FILM COATED ORAL at 09:48

## 2020-01-01 RX ADMIN — METOPROLOL TARTRATE 50 MG: 50 TABLET, FILM COATED ORAL at 09:19

## 2020-01-01 RX ADMIN — IPRATROPIUM BROMIDE AND ALBUTEROL SULFATE 1 AMPULE: 2.5; .5 SOLUTION RESPIRATORY (INHALATION) at 20:18

## 2020-01-01 RX ADMIN — METOPROLOL TARTRATE 50 MG: 50 TABLET, FILM COATED ORAL at 20:18

## 2020-01-01 RX ADMIN — Medication 5 MG: at 20:14

## 2020-01-01 RX ADMIN — SODIUM CHLORIDE, PRESERVATIVE FREE 10 ML: 5 INJECTION INTRAVENOUS at 22:20

## 2020-01-01 RX ADMIN — Medication 10 ML: at 10:19

## 2020-01-01 RX ADMIN — METOPROLOL TARTRATE 50 MG: 50 TABLET, FILM COATED ORAL at 08:41

## 2020-01-01 RX ADMIN — COLCHICINE 1.2 MG: 0.6 TABLET, FILM COATED ORAL at 14:23

## 2020-01-01 RX ADMIN — METOPROLOL TARTRATE 50 MG: 50 TABLET, FILM COATED ORAL at 21:25

## 2020-01-01 RX ADMIN — MORPHINE SULFATE 2 MG: 2 INJECTION, SOLUTION INTRAMUSCULAR; INTRAVENOUS at 06:40

## 2020-01-01 RX ADMIN — IPRATROPIUM BROMIDE AND ALBUTEROL SULFATE 1 AMPULE: 2.5; .5 SOLUTION RESPIRATORY (INHALATION) at 09:35

## 2020-01-01 RX ADMIN — SODIUM CHLORIDE 1000 ML: 9 INJECTION, SOLUTION INTRAVENOUS at 04:22

## 2020-01-01 RX ADMIN — BUPROPION HYDROCHLORIDE 150 MG: 150 TABLET, EXTENDED RELEASE ORAL at 12:08

## 2020-01-01 RX ADMIN — APIXABAN 5 MG: 5 TABLET, FILM COATED ORAL at 09:03

## 2020-01-01 RX ADMIN — APIXABAN 5 MG: 5 TABLET, FILM COATED ORAL at 08:41

## 2020-01-01 RX ADMIN — OXYCODONE HYDROCHLORIDE AND ACETAMINOPHEN 2 TABLET: 5; 325 TABLET ORAL at 17:15

## 2020-01-01 RX ADMIN — Medication 10 ML: at 08:47

## 2020-01-01 RX ADMIN — ISOSORBIDE MONONITRATE 60 MG: 60 TABLET ORAL at 08:05

## 2020-01-01 RX ADMIN — BUMETANIDE 1 MG: 0.25 INJECTION, SOLUTION INTRAMUSCULAR; INTRAVENOUS at 17:48

## 2020-01-01 RX ADMIN — ACETAMINOPHEN 650 MG: 325 TABLET, FILM COATED ORAL at 14:35

## 2020-01-01 RX ADMIN — ASPIRIN 81 MG CHEWABLE TABLET 81 MG: 81 TABLET CHEWABLE at 08:41

## 2020-01-01 RX ADMIN — LEVOTHYROXINE SODIUM 25 MCG: 0.03 TABLET ORAL at 06:59

## 2020-01-01 RX ADMIN — Medication 5 MG: at 21:22

## 2020-01-01 RX ADMIN — ISOSORBIDE MONONITRATE 30 MG: 30 TABLET ORAL at 09:47

## 2020-01-01 RX ADMIN — LEVOTHYROXINE SODIUM 25 MCG: 25 TABLET ORAL at 10:27

## 2020-01-01 RX ADMIN — ACETAMINOPHEN 650 MG: 325 TABLET, FILM COATED ORAL at 20:20

## 2020-01-01 RX ADMIN — SODIUM CHLORIDE, PRESERVATIVE FREE 10 ML: 5 INJECTION INTRAVENOUS at 21:36

## 2020-01-01 RX ADMIN — ASPIRIN 81 MG CHEWABLE TABLET 81 MG: 81 TABLET CHEWABLE at 12:10

## 2020-01-01 RX ADMIN — Medication 10 ML: at 09:20

## 2020-01-01 RX ADMIN — AMIODARONE HYDROCHLORIDE 200 MG: 200 TABLET ORAL at 08:46

## 2020-01-01 RX ADMIN — METOPROLOL TARTRATE 50 MG: 50 TABLET, FILM COATED ORAL at 21:54

## 2020-01-01 RX ADMIN — Medication 10 ML: at 09:03

## 2020-01-01 RX ADMIN — APIXABAN 5 MG: 5 TABLET, FILM COATED ORAL at 09:19

## 2020-01-01 RX ADMIN — SODIUM CHLORIDE, PRESERVATIVE FREE 10 ML: 5 INJECTION INTRAVENOUS at 20:15

## 2020-01-01 RX ADMIN — BUMETANIDE 1 MG: 0.25 INJECTION, SOLUTION INTRAMUSCULAR; INTRAVENOUS at 16:27

## 2020-01-01 RX ADMIN — ISOSORBIDE MONONITRATE 60 MG: 60 TABLET ORAL at 08:47

## 2020-01-01 RX ADMIN — METOPROLOL TARTRATE 50 MG: 50 TABLET, FILM COATED ORAL at 10:17

## 2020-01-01 RX ADMIN — COLCHICINE 0.6 MG: 0.6 TABLET, FILM COATED ORAL at 09:46

## 2020-01-01 RX ADMIN — SODIUM CHLORIDE, PRESERVATIVE FREE 10 ML: 5 INJECTION INTRAVENOUS at 20:20

## 2020-01-01 RX ADMIN — CALCIUM CARBONATE (ANTACID) CHEW TAB 500 MG 1000 MG: 500 CHEW TAB at 08:46

## 2020-01-01 RX ADMIN — CALCIUM CARBONATE (ANTACID) CHEW TAB 500 MG 1000 MG: 500 CHEW TAB at 21:21

## 2020-01-01 RX ADMIN — ASPIRIN 81 MG CHEWABLE TABLET 81 MG: 81 TABLET CHEWABLE at 10:18

## 2020-01-01 RX ADMIN — BUMETANIDE 1 MG: 0.25 INJECTION, SOLUTION INTRAMUSCULAR; INTRAVENOUS at 12:12

## 2020-01-01 RX ADMIN — TIZANIDINE 4 MG: 4 TABLET ORAL at 20:20

## 2020-01-01 RX ADMIN — SODIUM CHLORIDE, PRESERVATIVE FREE 10 ML: 5 INJECTION INTRAVENOUS at 20:41

## 2020-01-01 RX ADMIN — APIXABAN 5 MG: 5 TABLET, FILM COATED ORAL at 21:15

## 2020-01-01 RX ADMIN — TIZANIDINE 4 MG: 4 TABLET ORAL at 18:44

## 2020-01-01 RX ADMIN — IPRATROPIUM BROMIDE AND ALBUTEROL SULFATE 1 AMPULE: 2.5; .5 SOLUTION RESPIRATORY (INHALATION) at 10:20

## 2020-01-01 RX ADMIN — IPRATROPIUM BROMIDE AND ALBUTEROL SULFATE 1 AMPULE: 2.5; .5 SOLUTION RESPIRATORY (INHALATION) at 19:46

## 2020-01-01 RX ADMIN — METOPROLOL TARTRATE 50 MG: 50 TABLET, FILM COATED ORAL at 08:20

## 2020-01-01 RX ADMIN — LEVOTHYROXINE SODIUM 25 MCG: 25 TABLET ORAL at 06:37

## 2020-01-01 RX ADMIN — APIXABAN 5 MG: 5 TABLET, FILM COATED ORAL at 20:14

## 2020-01-01 RX ADMIN — ASPIRIN 81 MG CHEWABLE TABLET 81 MG: 81 TABLET CHEWABLE at 09:03

## 2020-01-01 RX ADMIN — ONDANSETRON 4 MG: 2 INJECTION INTRAMUSCULAR; INTRAVENOUS at 08:21

## 2020-01-01 RX ADMIN — ISOSORBIDE MONONITRATE 60 MG: 60 TABLET ORAL at 10:27

## 2020-01-01 RX ADMIN — FUROSEMIDE 40 MG: 10 INJECTION, SOLUTION INTRAMUSCULAR; INTRAVENOUS at 12:46

## 2020-01-01 RX ADMIN — APIXABAN 5 MG: 5 TABLET, FILM COATED ORAL at 20:39

## 2020-01-01 RX ADMIN — BUPROPION HYDROCHLORIDE 150 MG: 150 TABLET, EXTENDED RELEASE ORAL at 08:20

## 2020-01-01 RX ADMIN — ISOSORBIDE MONONITRATE 30 MG: 30 TABLET ORAL at 18:44

## 2020-01-01 RX ADMIN — ALLOPURINOL 100 MG: 100 TABLET ORAL at 09:47

## 2020-01-01 RX ADMIN — BUMETANIDE 1.5 MG/HR: 0.25 INJECTION, SOLUTION INTRAMUSCULAR; INTRAVENOUS at 03:56

## 2020-01-01 RX ADMIN — ISOSORBIDE MONONITRATE 60 MG: 60 TABLET ORAL at 08:29

## 2020-01-01 RX ADMIN — METOPROLOL TARTRATE 50 MG: 50 TABLET, FILM COATED ORAL at 08:25

## 2020-01-01 RX ADMIN — HYDRALAZINE HYDROCHLORIDE 10 MG: 10 TABLET, FILM COATED ORAL at 14:07

## 2020-01-01 RX ADMIN — ISOSORBIDE MONONITRATE 60 MG: 60 TABLET ORAL at 09:19

## 2020-01-01 RX ADMIN — IPRATROPIUM BROMIDE AND ALBUTEROL SULFATE 1 AMPULE: 2.5; .5 SOLUTION RESPIRATORY (INHALATION) at 21:04

## 2020-01-01 RX ADMIN — MORPHINE SULFATE 4 MG: 4 INJECTION, SOLUTION INTRAMUSCULAR; INTRAVENOUS at 20:04

## 2020-01-01 RX ADMIN — IPRATROPIUM BROMIDE AND ALBUTEROL SULFATE 1 AMPULE: 2.5; .5 SOLUTION RESPIRATORY (INHALATION) at 17:05

## 2020-01-01 RX ADMIN — SODIUM CHLORIDE, PRESERVATIVE FREE 10 ML: 5 INJECTION INTRAVENOUS at 08:06

## 2020-01-01 RX ADMIN — BUMETANIDE 2 MG/HR: 0.25 INJECTION, SOLUTION INTRAMUSCULAR; INTRAVENOUS at 19:23

## 2020-01-01 RX ADMIN — AMIODARONE HYDROCHLORIDE 200 MG: 200 TABLET ORAL at 22:22

## 2020-01-01 RX ADMIN — LEVOTHYROXINE SODIUM 25 MCG: 25 TABLET ORAL at 06:05

## 2020-01-01 RX ADMIN — ASPIRIN 81 MG CHEWABLE TABLET 81 MG: 81 TABLET CHEWABLE at 08:20

## 2020-01-01 RX ADMIN — AMIODARONE HYDROCHLORIDE 200 MG: 200 TABLET ORAL at 09:18

## 2020-01-01 RX ADMIN — BUMETANIDE 2 MG/HR: 0.25 INJECTION, SOLUTION INTRAMUSCULAR; INTRAVENOUS at 14:08

## 2020-01-01 RX ADMIN — ASPIRIN 81 MG CHEWABLE TABLET 81 MG: 81 TABLET CHEWABLE at 09:48

## 2020-01-01 RX ADMIN — Medication 5 MG: at 20:40

## 2020-01-01 RX ADMIN — IPRATROPIUM BROMIDE AND ALBUTEROL SULFATE 1 AMPULE: 2.5; .5 SOLUTION RESPIRATORY (INHALATION) at 13:32

## 2020-01-01 RX ADMIN — ASPIRIN 81 MG CHEWABLE TABLET 81 MG: 81 TABLET CHEWABLE at 09:49

## 2020-01-01 RX ADMIN — SODIUM CHLORIDE, PRESERVATIVE FREE 10 ML: 5 INJECTION INTRAVENOUS at 08:19

## 2020-01-01 RX ADMIN — APIXABAN 5 MG: 5 TABLET, FILM COATED ORAL at 21:35

## 2020-01-01 RX ADMIN — ASPIRIN 81 MG CHEWABLE TABLET 81 MG: 81 TABLET CHEWABLE at 09:19

## 2020-01-01 RX ADMIN — BUPROPION HYDROCHLORIDE 150 MG: 150 TABLET, EXTENDED RELEASE ORAL at 10:27

## 2020-01-01 RX ADMIN — METOPROLOL TARTRATE 50 MG: 50 TABLET, FILM COATED ORAL at 20:40

## 2020-01-01 RX ADMIN — BUPROPION HYDROCHLORIDE 150 MG: 150 TABLET, EXTENDED RELEASE ORAL at 08:19

## 2020-01-01 RX ADMIN — QUETIAPINE FUMARATE 25 MG: 25 TABLET ORAL at 22:18

## 2020-01-01 RX ADMIN — ATROPA BELLADONNA AND OPIUM 60 MG: 16.2; 6 SUPPOSITORY RECTAL at 13:26

## 2020-01-01 RX ADMIN — FUROSEMIDE 40 MG: 10 INJECTION, SOLUTION INTRAMUSCULAR; INTRAVENOUS at 05:24

## 2020-01-01 RX ADMIN — PERFLUTREN 1.65 MG: 6.52 INJECTION, SUSPENSION INTRAVENOUS at 15:12

## 2020-01-01 RX ADMIN — METOPROLOL TARTRATE 50 MG: 50 TABLET, FILM COATED ORAL at 21:19

## 2020-01-01 RX ADMIN — Medication 10 ML: at 08:21

## 2020-01-01 RX ADMIN — AMIODARONE HYDROCHLORIDE 200 MG: 200 TABLET ORAL at 21:15

## 2020-01-01 RX ADMIN — ONDANSETRON 4 MG: 2 INJECTION INTRAMUSCULAR; INTRAVENOUS at 21:37

## 2020-01-01 RX ADMIN — Medication 5 MG: at 08:21

## 2020-01-01 RX ADMIN — ACETAMINOPHEN 650 MG: 325 TABLET, FILM COATED ORAL at 23:27

## 2020-01-01 RX ADMIN — ATROPA BELLADONNA AND OPIUM 60 MG: 16.2; 6 SUPPOSITORY RECTAL at 05:10

## 2020-01-01 RX ADMIN — BUPROPION HYDROCHLORIDE 150 MG: 150 TABLET, EXTENDED RELEASE ORAL at 08:05

## 2020-01-01 RX ADMIN — ACETAMINOPHEN 650 MG: 325 TABLET, FILM COATED ORAL at 10:18

## 2020-01-01 RX ADMIN — BUPROPION HYDROCHLORIDE 150 MG: 150 TABLET, EXTENDED RELEASE ORAL at 08:29

## 2020-01-01 RX ADMIN — HYDRALAZINE HYDROCHLORIDE 10 MG: 10 TABLET, FILM COATED ORAL at 09:17

## 2020-01-01 RX ADMIN — OXYCODONE AND ACETAMINOPHEN 1 TABLET: 5; 325 TABLET ORAL at 02:25

## 2020-01-01 RX ADMIN — APIXABAN 5 MG: 5 TABLET, FILM COATED ORAL at 21:21

## 2020-01-01 RX ADMIN — Medication 5 MG: at 21:55

## 2020-01-01 RX ADMIN — LEVOTHYROXINE SODIUM 25 MCG: 0.03 TABLET ORAL at 06:30

## 2020-01-01 RX ADMIN — SODIUM CHLORIDE, PRESERVATIVE FREE 10 ML: 5 INJECTION INTRAVENOUS at 09:48

## 2020-01-01 RX ADMIN — HYDROXYZINE PAMOATE 25 MG: 25 CAPSULE ORAL at 09:04

## 2020-01-01 RX ADMIN — Medication 10 ML: at 12:07

## 2020-01-01 RX ADMIN — METOPROLOL TARTRATE 50 MG: 50 TABLET, FILM COATED ORAL at 08:49

## 2020-01-01 RX ADMIN — CALCIUM CARBONATE (ANTACID) CHEW TAB 500 MG 1000 MG: 500 CHEW TAB at 10:18

## 2020-01-01 RX ADMIN — OXYCODONE HYDROCHLORIDE AND ACETAMINOPHEN 2 TABLET: 5; 325 TABLET ORAL at 08:25

## 2020-01-01 RX ADMIN — APIXABAN 5 MG: 5 TABLET, FILM COATED ORAL at 20:06

## 2020-01-01 RX ADMIN — BUMETANIDE 1 MG: 0.25 INJECTION, SOLUTION INTRAMUSCULAR; INTRAVENOUS at 09:17

## 2020-01-01 RX ADMIN — ATROPA BELLADONNA AND OPIUM 60 MG: 16.2; 6 SUPPOSITORY RECTAL at 10:17

## 2020-01-01 RX ADMIN — ALLOPURINOL 100 MG: 100 TABLET ORAL at 09:18

## 2020-01-01 RX ADMIN — CEFDINIR 300 MG: 300 CAPSULE ORAL at 21:15

## 2020-01-01 RX ADMIN — ATROPA BELLADONNA AND OPIUM 60 MG: 16.2; 6 SUPPOSITORY RECTAL at 03:04

## 2020-01-01 RX ADMIN — BUMETANIDE 1 MG: 1 TABLET ORAL at 09:18

## 2020-01-01 RX ADMIN — APIXABAN 5 MG: 5 TABLET, FILM COATED ORAL at 20:20

## 2020-01-01 RX ADMIN — ASPIRIN 81 MG 81 MG: 81 TABLET ORAL at 09:18

## 2020-01-01 RX ADMIN — ISOSORBIDE MONONITRATE 60 MG: 60 TABLET ORAL at 08:42

## 2020-01-01 RX ADMIN — BUPROPION HYDROCHLORIDE 150 MG: 150 TABLET, EXTENDED RELEASE ORAL at 09:48

## 2020-01-01 RX ADMIN — METOPROLOL TARTRATE 50 MG: 50 TABLET, FILM COATED ORAL at 08:05

## 2020-01-01 RX ADMIN — CEFDINIR 300 MG: 300 CAPSULE ORAL at 06:52

## 2020-01-01 RX ADMIN — AMIODARONE HYDROCHLORIDE 200 MG: 200 TABLET ORAL at 20:14

## 2020-01-01 RX ADMIN — DIPHENHYDRAMINE HYDROCHLORIDE AND ZINC ACETATE: 10; 1 CREAM TOPICAL at 21:43

## 2020-01-01 RX ADMIN — ALLOPURINOL 100 MG: 100 TABLET ORAL at 18:43

## 2020-01-01 RX ADMIN — SODIUM CHLORIDE, PRESERVATIVE FREE 10 ML: 5 INJECTION INTRAVENOUS at 10:17

## 2020-01-01 RX ADMIN — IPRATROPIUM BROMIDE AND ALBUTEROL SULFATE 1 AMPULE: 2.5; .5 SOLUTION RESPIRATORY (INHALATION) at 16:17

## 2020-01-01 RX ADMIN — METOPROLOL SUCCINATE 50 MG: 50 TABLET, EXTENDED RELEASE ORAL at 09:19

## 2020-01-01 RX ADMIN — TIZANIDINE 4 MG: 4 TABLET ORAL at 08:20

## 2020-01-01 RX ADMIN — HYDRALAZINE HYDROCHLORIDE 10 MG: 10 TABLET, FILM COATED ORAL at 21:15

## 2020-01-01 RX ADMIN — IPRATROPIUM BROMIDE AND ALBUTEROL SULFATE 1 AMPULE: 2.5; .5 SOLUTION RESPIRATORY (INHALATION) at 12:41

## 2020-01-01 RX ADMIN — BUMETANIDE 2 MG: 0.25 INJECTION, SOLUTION INTRAMUSCULAR; INTRAVENOUS at 15:09

## 2020-01-01 RX ADMIN — SODIUM POLYSTYRENE SULFONATE 15 G: 15 SUSPENSION ORAL; RECTAL at 14:46

## 2020-01-01 RX ADMIN — CEFDINIR 300 MG: 300 CAPSULE ORAL at 08:48

## 2020-01-01 RX ADMIN — BUMETANIDE 1 MG: 0.25 INJECTION INTRAMUSCULAR; INTRAVENOUS at 09:48

## 2020-01-01 RX ADMIN — LEVOTHYROXINE SODIUM 25 MCG: 25 TABLET ORAL at 06:17

## 2020-01-01 RX ADMIN — ASPIRIN 81 MG 325 MG: 81 TABLET ORAL at 09:04

## 2020-01-01 RX ADMIN — APIXABAN 5 MG: 5 TABLET, FILM COATED ORAL at 09:18

## 2020-01-01 RX ADMIN — BUPROPION HYDROCHLORIDE 150 MG: 150 TABLET, EXTENDED RELEASE ORAL at 10:18

## 2020-01-01 RX ADMIN — ONDANSETRON 4 MG: 2 INJECTION INTRAMUSCULAR; INTRAVENOUS at 20:04

## 2020-01-01 RX ADMIN — HYDRALAZINE HYDROCHLORIDE 10 MG: 10 TABLET, FILM COATED ORAL at 14:43

## 2020-01-01 RX ADMIN — Medication 10 ML: at 20:18

## 2020-01-01 RX ADMIN — IPRATROPIUM BROMIDE AND ALBUTEROL SULFATE 1 AMPULE: 2.5; .5 SOLUTION RESPIRATORY (INHALATION) at 16:02

## 2020-01-01 RX ADMIN — LEVOTHYROXINE SODIUM 25 MCG: 25 TABLET ORAL at 06:49

## 2020-01-01 RX ADMIN — CALCIUM CARBONATE (ANTACID) CHEW TAB 500 MG 1000 MG: 500 CHEW TAB at 21:20

## 2020-01-01 RX ADMIN — ASPIRIN 81 MG CHEWABLE TABLET 81 MG: 81 TABLET CHEWABLE at 08:30

## 2020-01-01 RX ADMIN — AMIODARONE HYDROCHLORIDE 200 MG: 200 TABLET ORAL at 09:49

## 2020-01-01 RX ADMIN — ISOSORBIDE MONONITRATE 60 MG: 60 TABLET ORAL at 09:47

## 2020-01-01 RX ADMIN — METOPROLOL SUCCINATE 50 MG: 50 TABLET, EXTENDED RELEASE ORAL at 09:49

## 2020-01-01 RX ADMIN — METOPROLOL TARTRATE 50 MG: 50 TABLET, FILM COATED ORAL at 08:30

## 2020-01-01 RX ADMIN — SODIUM CHLORIDE: 9 INJECTION, SOLUTION INTRAVENOUS at 09:02

## 2020-01-01 RX ADMIN — SODIUM CHLORIDE, PRESERVATIVE FREE 10 ML: 5 INJECTION INTRAVENOUS at 02:36

## 2020-01-01 RX ADMIN — HYDROMORPHONE HYDROCHLORIDE 0.5 MG: 1 INJECTION, SOLUTION INTRAMUSCULAR; INTRAVENOUS; SUBCUTANEOUS at 21:38

## 2020-01-01 RX ADMIN — SODIUM CHLORIDE, PRESERVATIVE FREE 10 ML: 5 INJECTION INTRAVENOUS at 08:46

## 2020-01-01 RX ADMIN — HYDROMORPHONE HYDROCHLORIDE 0.5 MG: 1 INJECTION, SOLUTION INTRAMUSCULAR; INTRAVENOUS; SUBCUTANEOUS at 22:33

## 2020-01-01 RX ADMIN — BUPROPION HYDROCHLORIDE 150 MG: 150 TABLET, EXTENDED RELEASE ORAL at 09:47

## 2020-01-01 RX ADMIN — BUMETANIDE 1 MG: 0.25 INJECTION INTRAMUSCULAR; INTRAVENOUS at 08:48

## 2020-01-01 RX ADMIN — BUMETANIDE 1 MG: 1 TABLET ORAL at 08:05

## 2020-01-01 RX ADMIN — ZOLPIDEM TARTRATE 5 MG: 5 TABLET ORAL at 21:20

## 2020-01-01 RX ADMIN — METOPROLOL TARTRATE 50 MG: 50 TABLET, FILM COATED ORAL at 21:21

## 2020-01-01 RX ADMIN — BUMETANIDE 1 MG: 0.25 INJECTION INTRAMUSCULAR; INTRAVENOUS at 10:16

## 2020-01-01 RX ADMIN — BUMETANIDE 1.5 MG/HR: 0.25 INJECTION, SOLUTION INTRAMUSCULAR; INTRAVENOUS at 02:29

## 2020-01-01 RX ADMIN — Medication 5 MG: at 20:18

## 2020-01-01 RX ADMIN — DILTIAZEM HYDROCHLORIDE 25 MG: 5 INJECTION INTRAVENOUS at 06:47

## 2020-01-01 RX ADMIN — AMIODARONE HYDROCHLORIDE 200 MG: 200 TABLET ORAL at 09:48

## 2020-01-01 RX ADMIN — BUMETANIDE 1 MG: 0.25 INJECTION, SOLUTION INTRAMUSCULAR; INTRAVENOUS at 08:30

## 2020-01-01 RX ADMIN — CEFTRIAXONE SODIUM 1 G: 1 INJECTION, POWDER, FOR SOLUTION INTRAMUSCULAR; INTRAVENOUS at 02:01

## 2020-01-01 RX ADMIN — ISOSORBIDE MONONITRATE 60 MG: 60 TABLET ORAL at 12:08

## 2020-01-01 RX ADMIN — APIXABAN 5 MG: 5 TABLET, FILM COATED ORAL at 08:05

## 2020-01-01 RX ADMIN — Medication 5 MG: at 21:25

## 2020-01-01 ASSESSMENT — PAIN SCALES - GENERAL
PAINLEVEL_OUTOF10: 0
PAINLEVEL_OUTOF10: 4
PAINLEVEL_OUTOF10: 6
PAINLEVEL_OUTOF10: 0
PAINLEVEL_OUTOF10: 6
PAINLEVEL_OUTOF10: 0
PAINLEVEL_OUTOF10: 8
PAINLEVEL_OUTOF10: 5
PAINLEVEL_OUTOF10: 7
PAINLEVEL_OUTOF10: 3
PAINLEVEL_OUTOF10: 9
PAINLEVEL_OUTOF10: 2
PAINLEVEL_OUTOF10: 5
PAINLEVEL_OUTOF10: 6
PAINLEVEL_OUTOF10: 0
PAINLEVEL_OUTOF10: 7
PAINLEVEL_OUTOF10: 0
PAINLEVEL_OUTOF10: 7
PAINLEVEL_OUTOF10: 0
PAINLEVEL_OUTOF10: 3
PAINLEVEL_OUTOF10: 7
PAINLEVEL_OUTOF10: 8
PAINLEVEL_OUTOF10: 4
PAINLEVEL_OUTOF10: 7
PAINLEVEL_OUTOF10: 5
PAINLEVEL_OUTOF10: 3
PAINLEVEL_OUTOF10: 0
PAINLEVEL_OUTOF10: 8
PAINLEVEL_OUTOF10: 0
PAINLEVEL_OUTOF10: 1
PAINLEVEL_OUTOF10: 3
PAINLEVEL_OUTOF10: 0
PAINLEVEL_OUTOF10: 8
PAINLEVEL_OUTOF10: 9
PAINLEVEL_OUTOF10: 5
PAINLEVEL_OUTOF10: 2
PAINLEVEL_OUTOF10: 10
PAINLEVEL_OUTOF10: 6
PAINLEVEL_OUTOF10: 0
PAINLEVEL_OUTOF10: 10
PAINLEVEL_OUTOF10: 0
PAINLEVEL_OUTOF10: 10
PAINLEVEL_OUTOF10: 0

## 2020-01-01 ASSESSMENT — PAIN DESCRIPTION - DESCRIPTORS
DESCRIPTORS: ACHING
DESCRIPTORS: SQUEEZING
DESCRIPTORS: ACHING;BURNING;DISCOMFORT
DESCRIPTORS: TIGHTNESS
DESCRIPTORS: POUNDING;PRESSURE;SHARP;SHOOTING
DESCRIPTORS: DULL;SHARP
DESCRIPTORS: RADIATING;SHARP;SHOOTING
DESCRIPTORS: PRESSURE

## 2020-01-01 ASSESSMENT — PAIN DESCRIPTION - ONSET
ONSET: GRADUAL
ONSET: AWAKENED FROM SLEEP
ONSET: ON-GOING
ONSET: GRADUAL
ONSET: SUDDEN
ONSET: GRADUAL

## 2020-01-01 ASSESSMENT — ENCOUNTER SYMPTOMS
NAUSEA: 0
EYE REDNESS: 0
BACK PAIN: 0
WHEEZING: 0
SORE THROAT: 0
ABDOMINAL PAIN: 0
DIARRHEA: 0
VOMITING: 0
EYE DISCHARGE: 0
COUGH: 0
SINUS PRESSURE: 0
EYE PAIN: 0
SHORTNESS OF BREATH: 1

## 2020-01-01 ASSESSMENT — PAIN DESCRIPTION - ORIENTATION
ORIENTATION: RIGHT;LEFT
ORIENTATION: DISTAL
ORIENTATION: MID
ORIENTATION: LEFT
ORIENTATION: INNER
ORIENTATION: MID
ORIENTATION: RIGHT;LEFT
ORIENTATION: MID;LOWER;INNER

## 2020-01-01 ASSESSMENT — PAIN DESCRIPTION - FREQUENCY
FREQUENCY: INTERMITTENT
FREQUENCY: CONTINUOUS
FREQUENCY: INTERMITTENT
FREQUENCY: CONTINUOUS

## 2020-01-01 ASSESSMENT — PAIN - FUNCTIONAL ASSESSMENT
PAIN_FUNCTIONAL_ASSESSMENT: PREVENTS OR INTERFERES SOME ACTIVE ACTIVITIES AND ADLS
PAIN_FUNCTIONAL_ASSESSMENT: PREVENTS OR INTERFERES SOME ACTIVE ACTIVITIES AND ADLS
PAIN_FUNCTIONAL_ASSESSMENT: ACTIVITIES ARE NOT PREVENTED
PAIN_FUNCTIONAL_ASSESSMENT: PREVENTS OR INTERFERES SOME ACTIVE ACTIVITIES AND ADLS

## 2020-01-01 ASSESSMENT — PAIN DESCRIPTION - LOCATION
LOCATION: LEG
LOCATION: GROIN
LOCATION: PENIS
LOCATION: CHEST
LOCATION: TOE (COMMENT WHICH ONE)
LOCATION: PENIS
LOCATION: CHEST
LOCATION: LEG
LOCATION: PENIS
LOCATION: FOOT
LOCATION: LEG
LOCATION: ABDOMEN

## 2020-01-01 ASSESSMENT — PAIN DESCRIPTION - PROGRESSION
CLINICAL_PROGRESSION: GRADUALLY WORSENING
CLINICAL_PROGRESSION: NOT CHANGED
CLINICAL_PROGRESSION: GRADUALLY WORSENING

## 2020-01-01 ASSESSMENT — PAIN DESCRIPTION - PAIN TYPE
TYPE: ACUTE PAIN
TYPE: CHRONIC PAIN
TYPE: ACUTE PAIN
TYPE: CHRONIC PAIN

## 2020-01-01 ASSESSMENT — PATIENT HEALTH QUESTIONNAIRE - PHQ9: SUM OF ALL RESPONSES TO PHQ QUESTIONS 1-9: 2

## 2020-01-06 PROBLEM — R10.32 LEFT INGUINAL PAIN: Status: ACTIVE | Noted: 2020-01-01

## 2020-01-06 PROBLEM — R26.2 UNABLE TO AMBULATE: Status: ACTIVE | Noted: 2020-01-01

## 2020-01-06 NOTE — CONSULTS
GENERAL SURGERY  CONSULT NOTE  1/6/2020    Physician Consulted: Dr. Jacques Arredondo  Reason for Consult: inguinal hernia pain    HPI  Marcus Gallegos is a 69 y.o. male with extensive history including CHR, HTN, thyroid disease, GERD, afib on eliquis (last dose this morning 9 am), CABG who presents for evaluation of left inguinal hernia pain. Patient states he had sudden onset left groin pain starting around 4 pm 1/5 when he was exiting a car. The pain was so bad that he stopped being able to move his leg and walk without assistance. States the pain is 9/10 in his left groin, does not radiate anywhere, comes and goes but persistent, heating pad and pain medication makes it better, nothing makes it worse. Denies associated nausea, vomiting, is passing gas, last BM yesterday. Denies fevers, chills. Denies any prior history of hernias, abdominal surgeries. Last colonoscopy was about 2 years ago with unknown surgeon. Denies history of EGD. Quit smoking 15 years ago, denies EtOH, rec drugs. Patient has been hemodynamically stable in the ED. Cr 2.9, CO2 30. WBC 9.8, hgb 13.6. CTC abd/pelv shows bilateral inguinal hernias containing fat. No signs of inflammation or edema noted.        Past Medical History:   Diagnosis Date    CAD (coronary artery disease)     GERD (gastroesophageal reflux disease)     HFrEF (heart failure with reduced ejection fraction) ([facility] 1/25/16 1/22/16- echo- LVEF 20-25%, mild MR, pulmonary hypertension moderate-severe, aortic valve mildly sclerotic (5/26/15- echocardiogram- LVEF 40%, left atrium moderately dilated, mild tricuspid regurgitation)    History of blood transfusion     Hyperlipidemia     Hypertension     Systolic and diastolic CHF, chronic ([address]     8/9/15- cardiology consult combined systolic and diastolic heart failure    Toe cyanosis 12/25/2015       Past Surgical History:   Procedure Laterality Date    CARDIAC SURGERY      CORONARY ARTERY [facility]  [id number]    ECHO COMPL W DOP COLOR FLOW  2015            Medications Prior to Admission:    Prior to Admission medications    Medication Sig Start Date End Date Taking?  Authorizing Provider   bumetanide (BUMEX) 2 MG tablet Take 0.5 tablets by mouth daily 19   Savita Johns MD   metoprolol tartrate (LOPRESSOR) 25 MG tablet Take 2 tablets by mouth 2 times daily 19   Izabela Berger MD   isosorbide mononitrate (IMDUR) 60 MG extended release tablet Take 1 tablet by mouth daily 19   Hudson Breech, DO   apixaban (ELIQUIS) 5 MG TABS tablet Take 1 tablet by mouth 2 times daily 19   [facility]   rosuvastatin (CRESTOR) 5 MG tablet Take 5 mg by mouth three times a week Given Monday, Wednesday and     Historical Provider, MD   buPROPion (WELLBUTRIN XL) 150 MG extended release tablet Take 150 mg by mouth every morning    Historical Provider, MD   acetaminophen (TYLENOL) 500 MG tablet Take 1,000 mg by mouth nightly as needed for Pain  19   [facility], [facility]   levothyroxine (SYNTHROID) 25 MCG tablet Take 1 tablet by mouth Daily 11/25/15   [facility]   tiZANidine (ZANAFLEX) 4 MG tablet   Take 4 mg by mouth every 8 hours as needed     Historical Provider, MD       Allergies   Allergen Reactions    Crestor [Rosuvastatin Calcium] Hives     Patient states he get pimples or sores while he takes medication       Family History   Problem Relation Age of Onset    Other Mother     Cancer Father     High Cholesterol Brother     Coronary Art Dis Brother     Heart Disease Maternal Uncle     Heart Disease Maternal Grandfather        Social History     Tobacco Use    Smoking status: Former Smoker     Last attempt to quit: 2005     Years since quittin.6    Smokeless tobacco: Never Used   Substance Use Topics    Alcohol use: No    Drug use: No         Review of Systems   General ROS: negative  Hematological and Lymphatic ROS: negative  Respiratory ROS: negative  Cardiovascular ROS: negative  Gastrointestinal ROS: see HPI  Genito-Urinary ROS: negative  Musculoskeletal ROS: negative      PHYSICAL EXAM:    Vitals:    20 2230   BP: 117/86   Pulse:    Resp:    Temp:    SpO2:        General Appearance:  awake, alert, oriented, in no acute distress  Skin:  Skin color, texture, turgor normal. No rashes or lesions. Head/face:  NCAT  Eyes:  No gross abnormalities. Lungs:  No increased work of breathing on room air  Heart:  Heart regular rate and rhythm  Abdomen:  Soft, NT, ND, no rebound, guarding, rigidity. Bilateral inguinal hernias palpated, difficult to discern if completely reduced due to large amount of adipose and the hernia itself is fat contain. Was able to reduce some of the hernia without issue. Left groin pain to palpation. Negative skin changes  Extremities: pulses present in all extremities    LABS:    CBC  Recent Labs     20   WBC 9.8   HGB 13.6   HCT 45.3        BMP  Recent Labs     20      K 4.4      CO2 30*   BUN 49*   CREATININE 2.9*   CALCIUM 9.2     Liver Function  Recent Labs     20   BILITOT 0.7   AST 19   ALT 16   ALKPHOS 61   PROT 7.7   LABALBU 4.0     No results for input(s): LACTATE in the last 72 hours. Recent Labs     20   INR 1.8       RADIOLOGY    Ct Abdomen Pelvis Wo Contrast    Result Date: 2020  Patient MRN:  [id number] : 1945 Age: 69 years Gender: Male Order Date:  2020 8:00 PM EXAM: CT ABDOMEN PELVIS WO CONTRAST COMPARISON: 2015 INDICATION:  Pain to groin area on Left side Pain to groin area on Left side TECHNIQUE:  Low-dose CT acquisition technique included one of the following options; 1. Automated exposure control, 2. Adjustment of mA and/or kV according to the patient's size or 3. Use of iterative reconstruction. FINDINGS: No renal or ureteric calculus. No hydronephrosis.  There are multiple cysts associated with both kidneys appearing

## 2020-01-06 NOTE — PROGRESS NOTES
Physical Therapy    [facility]  Initial Assessment    NAME: Pete Schmitz  : 1945  MRN: [id number]    Date of Service: 2020    Physical therapy orders received/treatment attempted and chart review completed. Patient reported just returning from the bathroom and \"in too much pain\" to participate with assessment. Patient reported \"I almost collapsed\" and when asked why he reported due to the pain. Patient not willing to participate at this time. Will attempt at a later time/date as able. Thank you for the opportunity to assist in the care of this patient.     Candida Bennett, PT, DPT  License [id number]

## 2020-01-06 NOTE — CARE COORDINATION
Transition of care at discharge: Met with patient in the room. Patient is alert and oriented. He drove himself to the hospital and his car is in the ER lot. He lives alone, and drives. He is independent with ADLs. He does use a cane and has a walker if needed. He states he calls his son, Thi Conti who lives in [place] if he needs something otherwise he takes care of himself. He has no history of rehab/homecare. He states he has a nurse who sees him every couple months thru the insurance. He is not interested in homecare. His pcp is Dr Riaz Harden and his pharmacy is [facility]. He states he will drive himself home at discharge. He is asking to be discharged today.

## 2020-01-06 NOTE — PROGRESS NOTES
Will try for dc and short course FU for hernia repair with Dr Mae Hagan as an out pt, discussed with pt he is agreeable     Domiinque Adair MD

## 2020-01-06 NOTE — ED PROVIDER NOTES
98 - 107 mmol/L    CO2 30 (H) 22 - 29 mmol/L    Anion Gap 12 7 - 16 mmol/L    Glucose 118 (H) 74 - 99 mg/dL    BUN 49 (H) 8 - 23 mg/dL    CREATININE 2.9 (H) 0.7 - 1.2 mg/dL    GFR Non-African American 21 >=60 mL/min/1.73    GFR African American 26     Calcium 9.2 8.6 - 10.2 mg/dL    Total Protein 7.7 6.4 - 8.3 g/dL    Alb 4.0 3.5 - 5.2 g/dL    Total Bilirubin 0.7 0.0 - 1.2 mg/dL    Alkaline Phosphatase 61 40 - 129 U/L    ALT 16 0 - 40 U/L    AST 19 0 - 39 U/L   Protime-INR   Result Value Ref Range    Protime 20.0 (H) 9.3 - 12.4 sec    INR 1.8    Brain Natriuretic Peptide   Result Value Ref Range    Pro-BNP 8,321 (H) 0 - 125 pg/mL   Urinalysis   Result Value Ref Range    Color, UA Yellow Straw/Yellow    Clarity, UA Clear Clear    Glucose, Ur Negative Negative mg/dL    Bilirubin Urine Negative Negative    Ketones, Urine Negative Negative mg/dL    Specific Gravity, UA 1.025 1.005 - 1.030    Blood, Urine SMALL (A) Negative    pH, UA 6.0 5.0 - 9.0    Protein,  (A) Negative mg/dL    Urobilinogen, Urine 0.2 <2.0 E.U./dL    Nitrite, Urine POSITIVE (A) Negative    Leukocyte Esterase, Urine TRACE (A) Negative   Microscopic Urinalysis   Result Value Ref Range    WBC, UA 1-3 0 - 5 /HPF    RBC, UA 2-5 0 - 2 /HPF    Epi Cells FEW /HPF    Bacteria, UA MANY (A) /HPF       RADIOLOGY:  Interpreted by Radiologist.  CT ABDOMEN PELVIS WO CONTRAST   Final Result      1. Diverticulosis without evidence of acute diverticulitis. 2. Multiple bilateral renal cysts appearing similar since prior. Ultrasound follow-up could be helpful for further characterization. 3. Bilateral perinephric fat stranding could suggest chronic medical   renal disease with appropriate clinical history. 4. Infrarenal abdominal aortic aneurysm measures 3.8 cm. Continued   follow-up recommended as clinically indicated. 5. Fat-containing left inguinal hernia appears to be increase in size   compared to the previous examination.       US DUP show interstitial prominence bilaterally which has increased in size. Left inguinal hernia is easily reducible. Patient still reports increasing pain to that left inguinal site he does have strong left dorsal pedal pulses there is no suspicion of any arterial compromise. Patient not wishing to stand up he states that he cannot go home with this hernia pain. Plan will be to consult general surgery there is no suspicion of any strangulation or incarceration. General surgery was consulted and he was actually seen by the general surgery resident as well as the surgeon, [address] [place] [address]. Patient made aware that this is not a surgical emergency that this can be handled outpatient patient still not wanting to go home he states that he still has pain that he is not able to safely ambulate and he lives alone. There is family at bedside but both of them work and are unable to stay with him. Patient was medicated twice here he did not get any relief originally from morphine he also reports getting some relief now with the Dilaudid. I do not suspect any other sciatica or any other kind of neurovascular or vascular compromise. plan will be to admit patient to medical surgical for intractable pain, inability to ambulate. Plan will be for medical admission to observation. Patient was made aware of all laboratory and imaging results. Patient does remain neurovascular and hemodynamically stable. Patient expressed admission status and that likely he will not meet criteria for any additional placement. Will provide patient with pain relief before going to the floor. Patient resting comfortably. Counseling: The emergency provider has spoken with the patient and discussed todays results, in addition to providing specific details for the plan of care and counseling regarding the diagnosis and prognosis.   Questions are answered at this time and they are agreeable with the plan.      --------------------------------- IMPRESSION AND DISPOSITION ---------------------------------    IMPRESSION  1. Non-recurrent unilateral inguinal hernia without obstruction or gangrene    2. Urinary tract infection without hematuria, site unspecified    3. Intractable pain        DISPOSITION  Disposition: admit-OBS  Patient condition is good      NOTE: This report was transcribed using voice recognition software.  Every effort was made to ensure accuracy; however, inadvertent computerized transcription errors may be present     MATT Dawson - CNP  01/06/20 0231

## 2020-01-06 NOTE — ED NOTES
Bed: 15  Expected date:   Expected time:   Means of arrival:   Comments:  Rm [address] Theresa Yan RN  01/05/20 2306

## 2020-01-06 NOTE — H&P
11/30/2015    PO2 72.1 11/30/2015    HCO3 29.7 11/30/2015    BE 4.5 11/30/2015    O2SAT 94.8 11/30/2015     HgBA1c:  No results found for: LABA1C  FLP:    Lab Results   Component Value Date    TRIG 111 01/22/2016    HDL 24 01/22/2016    LDLCALC 47 01/22/2016    LABVLDL 22 01/22/2016     TSH:    Lab Results   Component Value Date    TSH 5.570 01/21/2016       CT ABDOMEN PELVIS WO CONTRAST   Final Result      1. Diverticulosis without evidence of acute diverticulitis. 2. Multiple bilateral renal cysts appearing similar since prior. Ultrasound follow-up could be helpful for further characterization. 3. Bilateral perinephric fat stranding could suggest chronic medical   renal disease with appropriate clinical history. 4. Infrarenal abdominal aortic aneurysm measures 3.8 cm. Continued   follow-up recommended as clinically indicated. 5. Fat-containing left inguinal hernia appears to be increase in size   compared to the previous examination. US DUP LOWER EXTREMITY LEFT ROSANNA   Final Result      No evidence to suggest deep venous thrombosis of the left lower   extremity. XR CHEST PORTABLE   Final Result      1. Moderate cardiomegaly appearing similar since prior. 2. Interstitial prominence bilaterally which has increased since   prior. Findings could suggest developing pulmonary vascular congestion   or peribronchial inflammatory changes. ASSESSMENT:      Active Problems:    Hyperlipidemia    CAD (coronary artery disease)    Obesities, morbid (HCC)    CARLTON (obstructive sleep apnea)    Abdominal aortic aneurysm without rupture (HCC)    Chronic kidney disease, stage III (moderate) ([facility])    Left ventricular systolic dysfunction    Unable to ambulate    Left inguinal pain    Non-recurrent unilateral inguinal hernia without obstruction or gangrene  Resolved Problems:    * No resolved hospital problems.  *      PLAN:    68-year-old male admitted with left groin pain and inability to ambulate found to have left inguinal hernia    Pain control  IV fluids  Medications for other co morbidities cont as appropriate w dosage adjustments as necessary   Neurosurgery consult no plan for intervention  -conservative management--stable for DC      Electronically signed by Susana Benz MD on 1/6/2020 at 10:42 AM

## 2020-01-06 NOTE — ED NOTES
FIRST PROVIDER CONTACT ASSESSMENT NOTE      Department of Emergency Medicine   ED  First Provider Note   1/5/20  7:24 PM    Chief Complaint: Groin Pain (left sided, sudden onset, tightness and unable to ambulate, hot flash from legs to chest )      History of Present Illness:    Jeffrey Burnette is a 69 y.o. male who presents to the ED by private car for Groin Pain (left sided, sudden onset, tightness and unable to ambulate, hot flash from legs to chest   Focused Screening Exam:  Constitutional:  Alert, appears stated age and is in no distress.       *ALLERGIES*     Crestor [rosuvastatin calcium]     ED Triage Vitals   BP Temp Temp src Pulse Resp SpO2 Height Weight   -- 01/05/20 1918 -- 01/05/20 1918 01/05/20 1923 01/05/20 1918 -- 01/05/20 1923    97.9 °F (36.6 °C)  91 18 91 %  270 lb (122.5 kg)        Initial Plan of Care:  Initiate Treatment-Testing, Proceed toTreatment Area When Bed Available for ED Attending/MLP to Continue Care      -----------------END OF FIRST PROVIDER CONTACT ASSESSMENT NOTE--------------  Electronically signed by MATT Davis CNP   DD: 1/5/20         MATT Davis CNP  01/05/20 1924

## 2020-01-07 NOTE — DISCHARGE SUMMARY
metoprolol tartrate (LOPRESSOR) 25 MG tablet Take 2 tablets by mouth 2 times daily  Qty: 60 tablet, Refills: 3      isosorbide mononitrate (IMDUR) 60 MG extended release tablet Take 1 tablet by mouth daily  Qty: 30 tablet, Refills: 3      apixaban (ELIQUIS) 5 MG TABS tablet Take 1 tablet by mouth 2 times daily  Qty: 60 tablet, Refills: 0      rosuvastatin (CRESTOR) 5 MG tablet Take 5 mg by mouth three times a week Given Monday, Wednesday and Fridays      buPROPion (WELLBUTRIN XL) 150 MG extended release tablet Take 150 mg by mouth every morning      levothyroxine (SYNTHROID) 25 MCG tablet Take 1 tablet by mouth Daily  Qty: 30 tablet, Refills: 0      tiZANidine (ZANAFLEX) 4 MG tablet   Take 4 mg by mouth every 8 hours as needed            Activity: activity as tolerated  Diet: cardiac diet    Follow-up with 1wk PCP, surgery, cardiology        Signed:  Ingris Hickey MD  1/7/2020  11:25 AM

## 2020-01-07 NOTE — PROGRESS NOTES
Subjective:  Feeling better   No CP or SOB  No fever or chills   No uncontrolled pain  No vomiting or diarrhea     Objective:    BP (!) 105/58   Pulse 100   Temp 98 °F (36.7 °C) (Temporal)   Resp 16   Ht 5' 10\" (1.778 m)   Wt 269 lb 2.9 oz (122.1 kg)   SpO2 94%   BMI 38.62 kg/m²     24HR INTAKE/OUTPUT:      Intake/Output Summary (Last 24 hours) at 1/7/2020 0911  Last data filed at 1/7/2020 0847  Gross per 24 hour   Intake 660 ml   Output --   Net 660 ml     nad  Heart:  RRR, no murmurs, gallops, or rubs. Lungs:  CTA bilaterally, no wheeze, rales or rhonchi  Abd: bowel sounds present, nontender, nondistended, no masses  Extrem:  No clubbing, cyanosis, or edema    Most Recent Labs  Lab Results   Component Value Date    WBC 9.8 01/05/2020    HGB 13.6 01/05/2020    HCT 45.3 01/05/2020     01/05/2020     01/05/2020    K 4.4 01/05/2020     01/05/2020    CREATININE 2.9 (H) 01/05/2020    BUN 49 (H) 01/05/2020    CO2 30 (H) 01/05/2020    GLUCOSE 118 (H) 01/05/2020    ALT 16 01/05/2020    AST 19 01/05/2020    INR 1.8 01/05/2020    TSH 5.570 (H) 01/21/2016    LABMICR 71.6 (H) 08/10/2015     No results for input(s): MG in the last 72 hours. Lab Results   Component Value Date    CALCIUM 9.2 01/05/2020    PHOS 3.7 12/25/2019        CT ABDOMEN PELVIS WO CONTRAST   Final Result      1. Diverticulosis without evidence of acute diverticulitis. 2. Multiple bilateral renal cysts appearing similar since prior. Ultrasound follow-up could be helpful for further characterization. 3. Bilateral perinephric fat stranding could suggest chronic medical   renal disease with appropriate clinical history. 4. Infrarenal abdominal aortic aneurysm measures 3.8 cm. Continued   follow-up recommended as clinically indicated. 5. Fat-containing left inguinal hernia appears to be increase in size   compared to the previous examination.       US DUP LOWER EXTREMITY LEFT ROSANNA   Final Result      No evidence to

## 2020-01-07 NOTE — PROGRESS NOTES
Physical Therapy  Initial Assessment     Name: Kristin Nava  : 1945  MRN: [id number]    Date of Service: 2020    Evaluating PT: Jonathan Vazquez, PT, DPT [id number]    Room #:  7756/7745-I    Diagnosis: Unable to ambulate  Precautions: Fall risk  PMHx: CAD, HTN, CHF    Pt lives with alone in a 2 story house with 1+2 stair(s) and 1+1 rail(s) to enter. Bed is on the first floor and bath is on the first floor. Cleaning lady assists with basement laundry. Pt ambulated with [address] Mod Independent prior to admission. HPI: Pt presented to the ED on  for sudden onset L groin pain. Pt was having difficulty ambulating due to symptoms. Physical examination revealed inguinal hernia. Initial Evaluation  Date: 20 Treatment Date: Short Term/ Long Term   Goals   AM-PAC 6 Clicks      Was pt agreeable to Eval/treatment? With encouragement     Does pt have pain? 2-3/10 L groin pain     Bed Mobility  Rolling: NT  Supine to sit: SBA  Sit to supine: SBA  Scooting: SBA toward EOB  Rolling: Independent   Supine to sit: Independent   Sit to supine: Independent   Scooting: Independent    Transfers Sit to stand: SBA  Stand to sit: SBA  Stand pivot: SBA with SPC  Sit to stand: Independent   Stand to sit: Independent   Stand pivot: Mod Independent with SPC   Ambulation   30 feet x2 with SPC with SBA  200 feet with SPC Mod Independent   Stair negotiation: 3 steps with 1 rail(s) and SPC with SBA  >4 step(s) with 1 rail(s) and SPC Mod Independent    ROM B UE: Refer to OT note  B LE: WFL     Strength B UE: Refer to OT note  B LE: WFL     Balance Sitting EOB: Supervision  Dynamic standing: SBA with SPC  Sitting EOB: Independent   Dynamic standing: Mod Independent with SPC     Pt is A & O x: 4 to person, place, month/year, and situation. Sensation: Pt denies numbness and tingling of extremities.   Coordination: NT  Edema: NT    ASSESSMENT    Patient education  Pt educated on proper technique when negotiating stairs with

## 2020-01-07 NOTE — CONSULTS
CARDIOLOGY CONSULTATION    Patient Name:  Rae Ball    :  1945    Reason for Consultation:   Inguinal hernia potential surgical repair; persistent atrial fibrillation; known coronary artery disease; preoperative cardiovascular risk assessment    History of Present Illness:   Rae Ball presents to [facility] following history of suddenly developing pain in his left inguinal area when getting up out of a car. He apparently now has a left inguinal hernia as well as a smaller right inguinal hernia as well. He is now being assessed for potential surgical intervention. On further questioning, Mr. Nicholas De La Fuente, has a history of coronary artery bypass surgery as well as morbid obesity obstructive sleep apnea importantly recurrent persistent atrial fibrillation for which he underwent a previous pulmonary vein isolation ablation approximately 4 .5 years ago. He specifically denies any chest discomfort presently. He does however, experience shortness of breath with mild exertion. He is now admitted for further diagnostic studies adjustment of his medical regimen. Past Medical History:   has a past medical history of CAD (coronary artery disease), GERD (gastroesophageal reflux disease), HFrEF (heart failure with reduced ejection fraction) ([facility] [address] History of blood transfusion, Hyperlipidemia, Hypertension, Systolic and diastolic CHF, chronic ([address] and Toe cyanosis. Surgical History:   has a past surgical history that includes Cardiac surgery; ECHO Compl W Dop Color Flow (2015); and Coronary artery bypass graft (10/4/2005). Social History:   reports that he quit smoking about 14 years ago. He has never used smokeless tobacco. He reports that he does not drink alcohol or use drugs. Family History:  family history includes Cancer in his father; Coronary Art Dis in his brother; Heart Disease in his maternal grandfather and maternal uncle; High Cholesterol in his brother;  Other hematemesis  · Genitourinary: No dysuria, trouble voiding or hematuria. No nocturia or increased frequency. · Musculoskeletal:  No gait disturbance, weakness or joint complaints. · Integumentary: No rash or pruritis. · Neurological: No headache, diplopia, change in muscle strength, numbness or tingling. No change in gait, balance, coordination, mood, affect, memory, mentation, behavior. · Psychiatric: No anxiety or depression. · Endocrine: No temperature intolerance. No excessive thirst, fluid intake, or urination. No tremor. · Hematologic/Lymphatic: No abnormal bruising or bleeding, blood clots or swollen lymph nodes. · Allergic/Immunologic: No nasal congestion or hives. Physical Examination:    Vital Signs: /80   Pulse 76   Temp 98.5 °F (36.9 °C) (Temporal)   Resp 18   Ht 5' 10\" (1.778 m)   Wt 269 lb 2.9 oz (122.1 kg)   SpO2 95%   BMI 38.62 kg/m²   General appearance: Well preserved, mesomorphic body habitus, alert, no distress. Skin: Skin color, texture, turgor normal. No rashes or lesions. No induration or tightening palpated. Head: Normocephalic. No masses, lesions, tenderness or abnormalities  Eyes: Conjunctivae/corneas clear. PERRL, EOMs intact. Sclera non icteric. Ears: External ears normal. Canals clear. TM's clear bilaterally. Hearing normal to finger rub. Nose/Sinuses: Nares normal. Septum midline. Mucosa normal. No drainage or sinus tenderness. Oropharynx: Lips, mucosa, and tongue normal. Oropharynx clear with no exudate seen. Neck: Neck supple and symmetric. No adenopathy. Thyroid symmetric, normal size, without nodules. Trachea is midline. Carotids brisk in upstroke without bruits, no abnormal JVP noted at 45°. Chest: Even excursion  Lungs: Lungs clear to auscultation bilaterally. No retractions or use of accessory muscles. No tactile vocal fremitus. No rhonchi, crackles or rales. Heart:  S1 > S2. Irregular, irregular rhythm. No gallop or murmur.  No rub, palpable thrill

## 2020-01-13 NOTE — PROGRESS NOTES
[facility]  PROGRESS NOTE  ATTENDING NOTE    Chief Complaint   Patient presents with    Consultation     discuss unilateral inguinal hernia, self referral. Patient states has a left sided inguinal hernia and patient states maybe a small one on the right side.  Other     Patient states back of left thigh at the bottom of the buttocdk is bruised. Patient states having pain in the same area. PAtient states its the same feeling when he pull hamstring when younger.  Abdominal Pain     Patient denies    Groin Pain     Patient denies     S:  72y/o M who was seen in the ED over the holidays. He had a lot of left leg and groin pain after getting out of a car. He was diagnosed with a left inguinal hernia. He stayed overnight in the hospital and was seen by cardiology who cleared him for surgery. Since that time, his pain has greatly improved. He had a lot of ecchymosis to the left leg and thinks he pulled his hamstring instead. He states he has a cardiology appointment next week and seeing the physician who is going to do his ablation next week. He is complaining of exertional dyspnea and insomnia    /70 (Site: Right Upper Arm, Position: Sitting, Cuff Size: Medium Adult)   Pulse 66   Resp 15   Ht 5' 10\" (1.778 m)   Wt 269 lb (122 kg)   SpO2 94%   BMI 38.60 kg/m²   Gen:  NAD  Left inguinal hernia--reducible  No hernia on right  Posterior left thigh ecchymosis and TTP    ASSESSMENT/PLAN  1. Reducible left inguinal hernia--plan to repair after decision for heart ablation first of hernia repair first.  It would be ok for the heart ablation to be done first as the inguinal hernia is not symptomatic right now. It is advised to be fixed however, given his age. We would do this in an open fashion under [address]. He would need to hold eliquis for 3 days prior.   2.  Left MSK pain  --does not want physical therapy  --ice and rest    Will touch base after cardiology

## 2020-01-15 PROBLEM — E87.5 HYPERKALEMIA: Status: ACTIVE | Noted: 2020-01-01

## 2020-01-15 PROBLEM — J96.01 ACUTE HYPOXEMIC RESPIRATORY FAILURE (HCC): Status: ACTIVE | Noted: 2020-01-01

## 2020-01-15 PROBLEM — I48.91 ATRIAL FIBRILLATION WITH RVR (HCC): Status: ACTIVE | Noted: 2020-01-01

## 2020-01-15 PROBLEM — N18.9 ACUTE KIDNEY INJURY SUPERIMPOSED ON CKD (HCC): Status: ACTIVE | Noted: 2020-01-01

## 2020-01-15 PROBLEM — R94.31 PROLONGED Q-T INTERVAL ON ECG: Status: ACTIVE | Noted: 2020-01-01

## 2020-01-15 PROBLEM — N17.9 ACUTE KIDNEY INJURY SUPERIMPOSED ON CKD (HCC): Status: ACTIVE | Noted: 2020-01-01

## 2020-01-15 PROBLEM — E87.20 LACTIC ACIDOSIS: Status: ACTIVE | Noted: 2020-01-01

## 2020-01-15 NOTE — ED NOTES
Bed: 16  Expected date:   Expected time:   Means of arrival:   Comments:  triage     Chong Mathis RN  01/15/20 [id number]

## 2020-01-15 NOTE — ED PROVIDER NOTES
HPI:  1/15/20,   Time: 4:08 AM       Marcus Gallegos is a 69 y.o. male presenting to the ED for chest pain and shortness of breath. , beginning 4 days ago. The complaint has been intermittent, mild in severity, and worsened by nothing. Patient states that over the last 3 to 4 days he has had increasing shortness of breath and chest pain. Worse with ambulation. The patient states that he has had difficulty sleeping and laying flat. He states that tonight due to not being able to sleep with continued symptoms he came to the ED for further evaluation. Review of Systems:   Pertinent positives and negatives are stated within HPI, all other systems reviewed and are negative.          --------------------------------------------- PAST HISTORY ---------------------------------------------  Past Medical History:  has a past medical history of CAD (coronary artery disease), GERD (gastroesophageal reflux disease), HFrEF (heart failure with reduced ejection fraction) ([facility] [address] History of blood transfusion, Hyperlipidemia, Hypertension, Systolic and diastolic CHF, chronic ([facility] [address] and Toe cyanosis. Past Surgical History:  has a past surgical history that includes Cardiac surgery; ECHO Compl W Dop Color Flow (5/26/2015); and Coronary artery bypass graft (10/4/2005). Social History:  reports that he quit smoking about 14 years ago. He has never used smokeless tobacco. He reports that he does not drink alcohol or use drugs. Family History: family history includes Cancer in his father; Coronary Art Dis in his brother; Heart Disease in his maternal grandfather and maternal uncle; High Cholesterol in his brother; Other in his mother. The patients home medications have been reviewed.     Allergies: Crestor [rosuvastatin calcium]        ---------------------------------------------------PHYSICAL EXAM--------------------------------------    Constitutional/General: Alert and oriented x3, ill-appearing, non toxic in having acute on chronic renal failure, hyperkalemia as well as A. fib with RVR patient remitted to the hospital for further care. The case was discussed with Sachi who is on-call for Dr. Vivian Fernandez who will admit the patient to the hospital for further care. A consult to nephrology was placed in the emergency department. This was signed out to Dr. Tamara Kowalski pending callback from nephrology. Critical care: 35 minutes    This patient's ED course included: a personal history and physicial examination, re-evaluation prior to disposition, multiple bedside re-evaluations, IV medications, cardiac monitoring and continuous pulse oximetry    This patient has remained hemodynamically stable during their ED course. Re-Evaluations:             Re-evaluation. Patients symptoms are improving      Counseling: The emergency provider has spoken with the patient and discussed todays results, in addition to providing specific details for the plan of care and counseling regarding the diagnosis and prognosis. Questions are answered at this time and they are agreeable with the plan.       --------------------------------- IMPRESSION AND DISPOSITION ---------------------------------    IMPRESSION  1. Atrial fibrillation with RVR ([facility] [address]    2. Hypoxia    3. Acute on chronic combined systolic and diastolic CHF (congestive heart failure) ([facility] [address]    4. SHARON (acute kidney [facility]) [facility]    5. Hyperkalemia        DISPOSITION  Disposition: Admit to telemetry  Patient condition is stable    NOTE: This report was transcribed using voice recognition software.  Every effort was made to ensure accuracy; however, inadvertent computerized transcription errors may be present        Yareli Kowalski DO  01/15/20 1989

## 2020-01-15 NOTE — H&P
[address] [facility]  History and Physical      CHIEF COMPLAINT:  Dyspnea    History of Present Illness:   Patient complains of several days worsening dyspnea on exertion, severe orthopnea, and nonproductive cough. No real alleviating factors. He's not sure if he's gained weight. He doesn't think he's missed diuretic doses. He denies chest pain or palpitations. He additionally noticed that his urine output has been getting lower - in fact the last time he urinated was about 24 hours ago (he thinks). Overnight his SPO2 dipped to 86%/RA but currently he is in the mid 90s on RA. Past Medical History:   Diagnosis Date    CAD (coronary artery disease)     GERD (gastroesophageal reflux disease)     HFrEF (heart failure with reduced ejection fraction) ([facility] [address] 1/25/16 1/22/16- echo- LVEF 20-25%, mild MR, pulmonary hypertension moderate-severe, aortic valve mildly sclerotic (5/26/15- echocardiogram- LVEF 40%, left atrium moderately dilated, mild tricuspid regurgitation)    History of blood transfusion     Hyperlipidemia     Hypertension     Systolic and diastolic CHF, chronic ([address]     8/9/15- cardiology consult combined systolic and diastolic heart failure    Toe cyanosis 12/25/2015         Past Surgical History:   Procedure Laterality Date    CARDIAC SURGERY      CORONARY ARTERY BYPASS GRAFT  10/4/2005    [id number]    Dr. Zane Bailey, [facility]    ECHO COMPL W DOP COLOR FLOW  5/26/2015            Medications Prior to Admission:    Medications Prior to Admission: hydrALAZINE (APRESOLINE) 10 MG tablet, Take 10 mg by mouth 3 times daily  triamcinolone (KENALOG) 0.1 % ointment, Apply topically 2 times daily Apply topically 2 times daily.   melatonin 5 MG TABS tablet, Take 5 mg by mouth daily  polyethylene glycol ([employer] PURELAX) powder, Take 17 g by mouth daily as needed   acetaminophen (TYLENOL) 500 MG tablet, Take 2 tablets by mouth every 6 hours as needed for Pain  docusate sodium (COLACE) 100 MG capsule, Take 1 capsule by mouth 2 times daily  magnesium hydroxide (MILK OF MAGNESIA) 400 MG/5ML suspension, Take 30 mLs by mouth daily as needed for Constipation  metoprolol tartrate (LOPRESSOR) 25 MG tablet, Take 2 tablets by mouth 2 times daily  isosorbide mononitrate (IMDUR) 60 MG extended release tablet, Take 1 tablet by mouth daily  apixaban (ELIQUIS) 5 MG TABS tablet, Take 1 tablet by mouth 2 times daily  buPROPion (WELLBUTRIN XL) 150 MG extended release tablet, Take 150 mg by mouth every morning  levothyroxine (SYNTHROID) 25 MCG tablet, Take 1 tablet by mouth Daily  tiZANidine (ZANAFLEX) 4 MG tablet,  Take 4 mg by mouth every 8 hours as needed     Note that the patient's home medications were reviewed and the above list is accurate to the best of my knowledge at the time of the exam.    Allergies:    Crestor [rosuvastatin calcium]    Social History:    reports that he quit smoking about 14 years ago. He has never used smokeless tobacco. He reports that he does not drink alcohol or use drugs. Family History:   family history includes Cancer in his father; Coronary Art Dis in his brother; Heart Disease in his maternal grandfather and maternal uncle; High Cholesterol in his brother; Other in his mother. REVIEW OF SYSTEMS:  As above in the HPI, otherwise negative    PHYSICAL EXAM:    Vitals:  /83   Pulse 83   Temp 97.6 °F (36.4 °C) (Oral)   Resp 22   Ht 5' 10\" (1.778 m)   Wt 273 lb (123.8 kg)   SpO2 95%   BMI 39.17 kg/m²     General appearance: Nontoxic comfortable appearing man in NAD  HEENT: AT/NC, MMM  Neck: FROM, supple  Lungs: Faint bibasilar rales. WOB normal.  CV: Irr/irr, rate is well controlled. Abdomen: Soft, non-tender; no masses or HSM, +BS  Extremities: No peripheral edema or digital cyanosis  Skin: no rash, lesions or ulcers  Psych: Calm and cooperative  Neuro: Alert and interactive, nonfocal    LABS:  All labs reviewed.   Of note:  CBC:   Lab Results   Component Value Date WBC 11.7 01/15/2020    RBC 4.84 01/15/2020    HGB 13.9 01/15/2020    HCT 47.3 01/15/2020    MCV 97.7 01/15/2020    MCH 28.7 01/15/2020    MCHC 29.4 01/15/2020    RDW 16.3 01/15/2020     01/15/2020    MPV 10.8 01/15/2020     BMP:    Lab Results   Component Value Date     01/15/2020    K 5.5 01/15/2020    K 7.2 01/15/2020    CL 95 01/15/2020    CO2 21 01/15/2020    BUN 66 01/15/2020    LABALBU 3.9 01/15/2020    CREATININE 4.0 01/15/2020    CALCIUM 9.6 01/15/2020    GFRAA 18 01/15/2020    LABGLOM 15 01/15/2020    GLUCOSE 148 01/15/2020       Imaging:  I've personally reviewed the patient's CXR which shows bilateral infiltrates c/w CHF    EKG:  I've personally reviewed the patient's EKG which is AF w/RVR no acute ischemic changes,  but QTc 557. Telemetry:  I've personally reviewed the patient's telemetry - current rates ~90-100s. (off dilt gtt)    ASSESSMENT/PLAN:  Principal Problem:    Acute on chronic systolic CHF (congestive heart failure) (HCC)  Active Problems:    Hyperlipidemia    HTN (hypertension), benign    CKD (chronic kidney disease) stage 4, GFR 15-29 ml/min (HCC)    Atrial fibrillation with RVR (HCC)    Hyperkalemia    Lactic acidosis    Acute kidney injury superimposed on CKD ([facility] [address]    Acute hypoxemic respiratory failure (HCC)    Prolonged Q-T interval on ECG  Resolved Problems:    * No resolved hospital problems. *    He was started on dilt gtt in ED but that is now off  Rates are marginally controlled but I think we don't need to add additional rate control at this time. Continue metop 50 BID. Lasix 40 IV x1. Stop IV fluids. Repeat Lasix as needed. Strict I/Os and daily weights  His K is coming down. Will give 15 g kayexalate  Has SHARON but this might get better with diuresis. He is clearly in CHF and not prerenal.  Avoid QT prolonging drugs  Lactic acidosis seems to be \"type B\", as he appears well perfused clinically.   His mental status is totally normal.  Lactic acidosis may be related to renal dysfunction. There are no clinical signs of sepsis.     Code status: FULL CODE  Requires continued inpatient level of care   Kristen Palafox    12:49 PM  1/15/2020  Cell: [phone]

## 2020-01-15 NOTE — PROGRESS NOTES
Evaluating Therapist: Leopoldo Ceo, PT, DPT    Room #: 3843/9019-U  Diagnosis: Afib with RVR  Past medical history: CHF, HTN, CAD, SHARON  The admitting diagnosis and active problem list as listed above have been reviewed prior to the initiation of this evaluation. Equipment owned: [address] [place] [address]: Chest pain, SOB, dizzy    Social:  Pt lives alone in a 2 floor plan with 3 step(s) and 1 rail(s) to enter with 1st floor setup. Prior to admission pt walked with Mod I for short distances with SPC. Initial Evaluation  Date: 1/15/2020 Treatment  Date:     Short Term/ Long Term   Goals   AM-PAC 6 Clicks 77/92     Does pt have pain? N     Bed Mobility  Rolling: Supervision  Supine to sit: Supervision  Sit to supine: SUpervision  Scooting: SUpervision  IND   Transfers Sit to stand: SBA  Stand to sit: SBA  Stand pivot: SBA  IND   Ambulation   35 feet with SBA with SPC  100 feet with IND with SPC   Stair negotiation: ascended and descended NA  3 steps with 1 rail with Mod I   BLE ROM WNL     BLE strength Grossly 4/5  Increase by 1/3 MMT grade   Balance Sitting: Fair+  Standing: Fair  Sitting: Good  Standing: Fair+     Pt is alert and oriented x 4  Sensation: WNL  Edema: None    ASSESSMENT  Patient exhibits decreased strength, balance, coordination impairing functional mobility. Educated pt on techniques to improve safety and independence with bed mobility, transfers, balance, functional transfers, and functional mobility. Pt sat EOB for 10 minutes with Supervision in order to improve static and dynamic sitting balance and activity tolerance. Pt ambulated with decreased nato and stride length 35' with SBA with SPC. Pt was slightly unsteady with function with no LOB but did state that he had 5/10 dizziness during ambulation. Pt demonstrated fair understanding of education/techniques requiring additional education/training as well as fair safety awareness.  At end of session, pt was left in chair with call light in reach

## 2020-01-15 NOTE — CONSULTS
cardiology who cleared him for surgery. Since that time, his pain has greatly improved. He had a lot of ecchymosis to the left leg and thinks he pulled his hamstring. He states he has a cardiology appointment next week.   Per Dr Mery Prado he has a reducible left inguinal hernia w/ plan to repair after decision for heart ablation first of hernia repair first.      Past Medical History:        Diagnosis Date    CAD (coronary artery disease)     GERD (gastroesophageal reflux disease)     HFrEF (heart failure with reduced ejection fraction) ([facility] [address] 1/25/16 1/22/16- echo- LVEF 20-25%, mild MR, pulmonary hypertension moderate-severe, aortic valve mildly sclerotic (5/26/15- echocardiogram- LVEF 40%, left atrium moderately dilated, mild tricuspid regurgitation)    History of blood transfusion     Hyperlipidemia     Hypertension     Systolic and diastolic CHF, chronic ([facility] [address]     8/9/15- cardiology consult combined systolic and diastolic heart failure    Toe cyanosis 12/25/2015       Past Surgical History:        Procedure Laterality Date    CARDIAC SURGERY      CORONARY ARTERY BYPASS GRAFT  10/4/2005    [id number]    Dr. Sondra Sellers, [facility]    ECHO COMPL W DOP COLOR FLOW  5/26/2015            Current Medications:    Current Facility-Administered Medications: glucose (GLUTOSE) 40 % oral gel 15 g, 15 g, Oral, PRN  dextrose 50 % IV solution, 12.5 g, Intravenous, PRN  glucagon (rDNA) injection 1 mg, 1 mg, Intramuscular, PRN  dextrose 5 % solution, 100 mL/hr, Intravenous, PRN  apixaban (ELIQUIS) tablet 5 mg, 5 mg, Oral, BID  buPROPion (WELLBUTRIN XL) extended release tablet 150 mg, 150 mg, Oral, QAM  isosorbide mononitrate (IMDUR) extended release tablet 60 mg, 60 mg, Oral, Daily  levothyroxine (SYNTHROID) tablet 25 mcg, 25 mcg, Oral, Daily  melatonin tablet 5 mg, 5 mg, Oral, Daily  metoprolol tartrate (LOPRESSOR) tablet 50 mg, 50 mg, Oral, BID  sodium chloride flush 0.9 % injection 10 mL, 10 mL, Intravenous, 2 times per day  sodium chloride flush 0.9 % injection 10 mL, 10 mL, Intravenous, PRN  magnesium hydroxide (MILK OF MAGNESIA) 400 MG/5ML suspension 30 mL, 30 mL, Oral, Daily PRN  ondansetron (ZOFRAN) injection 4 mg, 4 mg, Intravenous, Q6H PRN  aspirin chewable tablet 81 mg, 81 mg, Oral, Daily  0.9 % sodium chloride infusion, , Intravenous, Continuous  acetaminophen (TYLENOL) tablet 650 mg, 650 mg, Oral, Q4H PRN    Allergies:  Crestor [rosuvastatin calcium]    Social History:      reports that he quit smoking about 14 years ago. He has never used smokeless tobacco. He reports that he does not drink alcohol or use drugs. Family History:     Family History   Problem Relation Age of Onset    Other Mother     Cancer Father     High Cholesterol Brother     Coronary Art Dis Brother     Heart Disease Maternal Uncle     Heart Disease Maternal Grandfather          Review of Systems:       Pertinent positives stated above in HPI. All other systems were reviewed and were negative.     Physical exam:   Constitutional:  VITALS:  BP (!) 147/82   Pulse 75   Temp 97.6 °F (36.4 °C) (Oral)   Resp 22   Ht 5' 10\" (1.778 m)   Wt 273 lb (123.8 kg)   SpO2 95%   BMI 39.17 kg/m²   CURRENT TEMPERATURE:  Temp: 97.6 °F (36.4 °C)  CURRENT RESPIRATORY RATE:  Resp: 22  CURRENT PULSE:  Pulse: 75  CURRENT BLOOD PRESSURE:  BP: (!) 147/82  24HR BLOOD PRESSURE RANGE:  Systolic (60QEL), KK , Min:119 , UGT:015   ; Diastolic (88GLL), LMD:166, Min:82, Max:115    24HR INTAKE/OUTPUT:      Intake/Output Summary (Last 24 hours) at 1/15/2020 0933  Last data filed at 1/15/2020 0902  Gross per 24 hour   Intake 10 ml   Output --   Net 10 ml     Gen: alert, awake, nad  Skin: no rash, turgor wnl  Heent:  eomi, mmm  Neck: no bruits or jvd noted  Cardiovascular: irregularly irregular rhythm, normal S1 and S2, no S3, 2/6 systolic murmur  Respiratory: Diminished bases w/ few rhonchi   Abdomen:  Large, distended, firm   Ext: 2+ LE edema / bruising L upper leg  Psychiatric: mood and affect appropriate  Musculoskeletal:  Rom, muscular strength intact    DATA:      CBC:   Lab Results   Component Value Date    WBC 11.7 01/15/2020    RBC 4.84 01/15/2020    HGB 13.9 01/15/2020    HCT 47.3 01/15/2020    MCV 97.7 01/15/2020    MCH 28.7 01/15/2020    MCHC 29.4 01/15/2020    RDW 16.3 01/15/2020     01/15/2020    MPV 10.8 01/15/2020     BMP:    Lab Results   Component Value Date     01/15/2020    K 6.1 01/15/2020    K 7.2 01/15/2020    CL 95 01/15/2020    CO2 21 01/15/2020    BUN 66 01/15/2020    LABALBU 3.9 01/15/2020    CREATININE 4.0 01/15/2020    CALCIUM 9.6 01/15/2020    GFRAA 18 01/15/2020    LABGLOM 15 01/15/2020    GLUCOSE 148 01/15/2020       RAD:  Xr Chest Portable    Result Date: 1/15/2020  Patient MRN:  [id number] : 1945 Age: 69 years Gender: Male Order Date:  1/15/2020 4:00 AM EXAM: XR CHEST PORTABLE one image INDICATION:  cp cp COMPARISON: 2020 FINDINGS: There is cardiomegaly. There is vascular congestion with the perihilar and bibasilar infiltrates and small pleural effusions. There is COPD. Mild CHF without edema.          Assessment/Plan    1) Acute kidney on CKD Stage 3 (remote Scr from 2016 was 2.4 - 2.9):   - now elevated in the setting of decreased renal perfusion w/ A-fib and CHF   - patient presents w/ volume overload and will need aggressive diuresis (possibly at the expense of temporarily worsening renal function)   - renal imagin19 (Kidneys are negative for evidence of solid mass, hydronephrosis or calculus disease - Bilateral renal cysts are present measuring up to 5.4 cm on the right and 2.1 cm on the left) - in the past cysts present w/ CT of the abdomen on 12/23/15   - need UA, lytes and creat  - plan to follow labs and daily I&O   - avoid nephrotoxins     2) Hyerkalemia:  - on non supplements at home   - likely diet induced  - medically treated in ER  - now w/ diarrhea   - will repeat stat before further treatment     3) Acute systolic CHF: w/ elevated BNP  - ECHO (1/22/16) w/ ejection fraction is 20-25 %. - fluid restriction and stop current IVF  - initiate diuresis  - Dr Vo Median to follow      4) A Fib:   - Cardizem in the ER  - now rate controlled   - scheduled for OP ablation soon      5) HTN: follow on current meds      6) Hypothyroid: per PCP              Thank you for allowing us to participate in care of Chavo MATT Gonzalez - CNP  1/15/2020  9:33 AM     Patient seen and examined all key components of the physical performed independently , case discussed with NP, all pertinent labs and radiologic tests personally reviewed agree with above.      -SHARON, prerenal; reports severe diarrhea several days prior to admission; will give some iIVF, and watch closely for signs of CHF  -may require dialysis; he is adamant he will not do dialysis , strating its no way to Zaida Jacinto MD

## 2020-01-15 NOTE — PROGRESS NOTES
Pt instructed to not to eat lunch until nurse check blood sugar, pt went ahead and ate anyway so not able to check blood sugar for lunch

## 2020-01-15 NOTE — PROGRESS NOTES
Occupational Therapy  OCCUPATIONAL THERAPY INITIAL EVALUATION      Date:1/15/2020  Patient Name: Nayeli Bentley  MRN: [id number]  : 1945  Room: [address] [place] [address]        Evaluating OT: Jessica Dozier OTR/L [id number]    AM-PAC Daily Activity Raw Score:     Recommended Adaptive Equipment: TBD     Comments: Based on patient's functional performance as documented below and prior level of function, patient would benefit from continued skilled OT during/following hospital stay in an effort to increase functional safety, independence with ADLS/IADLS, and overall quality of life    Diagnosis: A fib with RVR   Pertinent Medical History: HTN,    Precautions:  Falls,      Home Living: Pt lives alone, 2 story with 1st floor set-up. 3 steps/rail to enter. Laundry in basement. Walk in shower with grab bars.  Cleaning 1x/week    Prior Level of Function: Independent  with ADLs , assist  with IADLs; ambulated cane   Driving: yes     Pain Level: no pain ; biggest complaint \"not sleeping \"  Cognition:  Ox3,alert and conversing     Judgement/safety:  Fair      Functional Assessment:   Initial Eval Status  Date: 1/15/20 Tx Session  Short Term Goals  Treatment frequency: PRN   Feeding Independent      Grooming Set-up   Mod I    UB Dressing Set-up   Mod I    LB Dressing MiN A  Difficulty lifting L LE - bruising back of leg - per patient - pulled hamstring   Mod I    Bathing Min A   Mod I    Toileting Independent      Bed Mobility  Independent        Functional Transfers Supervision   Sit-supine   Mod I    Functional Mobility SBA, cane   Ambulating in room   Mod I with good tolerance    Balance Sitting:     Static:  Independent     Standing: supervision      Activity Tolerance Fair -  Good  with ADL activity   Visual/  Perceptual Glasses: none by bedside                 Right  hand dominant    Strength ROM Additional Info:    RUE  3+/5  WFL good  and wfl FMC/dexterity noted during ADL tasks       LUE 3+/5  WFL good  and wfl

## 2020-01-16 NOTE — PROGRESS NOTES
[facility]   Department of Pharmacy   Pharmacist Transition of Care Services         Patient Demographics  Name:  Marcus Gallegos   Medical Record Number:  [id number]  Gender:  male   Age:  69 y.o. YOB: 1945    Primary Care Physician: Rose Briscoe MD  Primary Care Physician phone number:  [phone]  Readmission Risk (% from EPIC Patient List): 21 %     Patient plans to participate in [facility] Meds to [facility] (Y/N): no    Pharmacist Review and Summary of Medications     Date of last reviewed/update: 1/16/20     Category Comments   New Medication Started   1. Aspirin 81mg PO daily   2. bumex drip  3. Zolpidem 5mg PO nightly PRN        Change in Outpatient Medication  (Dosage Form, Route,   Dose, or Frequency) 1. Discontinued Outpatient Medication   (or on Hold During Admission) 1. Hydralazine- on hold in hospital   2. Tizanidine- on hold in hospital   3. bumex inj- on hold in hospital  4. Rosuvastatin- on hold in hospital          Other              Pharmacist Patient Education:    Date  Person Educated Content of Education                 Documentation of Pharmacist Interventions and Follow-up Plan:     The following Pharmacist Transition of Care Services were completed:   []  Reviewed and summarized medication changes  []  Entire home medication list was reviewed for accuracy (sources: **)  []  Home medication list was updated or corrected     []  Reviewed discharge medication reconciliation  []  Discharge medication list was updated or corrected  []  Patient education was provided on new medications  []  Patient education was provided on medication changes  []  Reviewed the After Visit Summary (AVS) with patient    Additional Interventions:  []  Inpatient prescriber was contacted and the following pharmacy recommendations        were accepted: **     [] Other interventions: **        Pharmacist: Claudia Silverio PharmD, [facility]  Date:  1/16/2020 3:33 PM  Time spent counseling on medications: 30 minutes

## 2020-01-16 NOTE — PROGRESS NOTES
Informed Dr. Best Comes via perfect serve that Pt had trouble voiding earlier and informed Anabell Daley and pt voided after given Lasix only 200cc, pt has not voided since 1430 and bladder scanned him just now and he had 0cc of urine, pt still receiving fluids and drinking orally but not drinking a lot due to chf.   Dr. Best Comes responded to continue to observe pt so I informed night nurse regarding Dr. Marj Brush message

## 2020-01-16 NOTE — PROGRESS NOTES
Informed Dr. Paty Naik regarding tele reading and MD stated pt vcan be transferred to [facility] without tele

## 2020-01-16 NOTE — CONSULTS
CARDIOLOGY CONSULTATION    Patient Name:  Catie Salas    :  1945    Reason for Consultation:   Recurrent CHF and atrial fibrillation    History of Present Illness:   Catie Salas presents to [facility] following history of rapidly increasing shortness of breath and swelling over the past few days prior to admission denying any change in diet or noncompliance with medical regimen. He has known renal insufficiency of great significance while his recurrent atrial fibrillation. Mr. Alesia Kendrick, has a history of coronary artery bypass surgery as well as morbid obesity obstructive sleep apnea importantly recurrent persistent atrial fibrillation for which he underwent a previous pulmonary vein isolation ablation approximately 4 .5 years ago. He specifically denies any chest discomfort presently. He does however, experience shortness of breath with mild exertion. He is now admitted for further diagnostic studies adjustment of his medical regimen. Past Medical History:   has a past medical history of CAD (coronary artery disease), GERD (gastroesophageal reflux disease), HFrEF (heart failure with reduced ejection fraction) ([address] History of blood transfusion, Hyperlipidemia, Hypertension, Systolic and diastolic CHF, chronic ([address] and Toe cyanosis. Surgical History:   has a past surgical history that includes Cardiac surgery; ECHO Compl W Dop Color Flow (2015); and Coronary artery bypass graft (10/4/2005). Social History:   reports that he quit smoking about 14 years ago. He has never used smokeless tobacco. He reports that he does not drink alcohol or use drugs. Family History:  family history includes Cancer in his father; Coronary Art Dis in his brother; Heart Disease in his maternal grandfather and maternal uncle; High Cholesterol in his brother; Other in his mother. Medications:  Prior to Admission medications    Medication Sig Start Date End Date Taking?  Authorizing Provider   hydrALAZINE (APRESOLINE) 10 MG tablet Take 10 mg by mouth 3 times daily   Yes Historical Provider, MD   triamcinolone (KENALOG) 0.1 % ointment Apply topically 2 times daily Apply topically 2 times daily. Historical Provider, MD   melatonin 5 MG TABS tablet Take 5 mg by mouth daily    Historical Provider, MD   polyethylene glycol ([employer] PURELAX) powder Take 17 g by mouth daily as needed     Historical Provider, MD   acetaminophen (TYLENOL) 500 MG tablet Take 2 tablets by mouth every 6 hours as needed for Pain 1/7/20   Romaine Madrigal MD   docusate sodium (COLACE) 100 MG capsule Take 1 capsule by mouth 2 times daily 1/7/20 2/6/20  Romaine Madrigal MD   magnesium hydroxide (MILK OF MAGNESIA) 400 MG/5ML suspension Take 30 mLs by mouth daily as needed for Constipation 1/7/20   Romaine Madrigal MD   metoprolol tartrate (LOPRESSOR) 25 MG tablet Take 2 tablets by mouth 2 times daily 12/25/19   Jhonatan Stahl MD   isosorbide mononitrate (IMDUR) 60 MG extended release tablet Take 1 tablet by mouth daily 12/25/19   Mikael Quick DO   apixaban (ELIQUIS) 5 MG TABS tablet Take 1 tablet by mouth 2 times daily 12/25/19   Mikael Quick DO   buPROPion (WELLBUTRIN XL) 150 MG extended release tablet Take 150 mg by mouth every morning    Historical Provider, MD   levothyroxine (SYNTHROID) 25 MCG tablet Take 1 tablet by mouth Daily 11/25/15   Mikael Quick DO   tiZANidine (ZANAFLEX) 4 MG tablet   Take 4 mg by mouth every 8 hours as needed     Historical Provider, MD       Allergies:  Crestor [rosuvastatin calcium]     Review of Systems:   · Constitutional: there has been no unanticipated weight loss. There's been no significant change in energy level, sleep pattern or activity level. No fever chills or rigors. · Eyes: No visual changes or diplopia. No scleral icterus. · ENT: No Headaches, hearing loss or vertigo. No mouth sores or sore throat. No change in taste or smell.   · Cardiovascular: No chest discomfort, dyspnea on exertion, palpitations, loss of consciousness, no phlebitis, no claudication. · Respiratory: No cough or wheezing, no sputum production. No hemoptysis, pleuritic pain. · Gastrointestinal: Mild abdominal fullness and discomfort, appetite loss, blood in stools. No change in bowel habits. No hematemesis  · Genitourinary: No dysuria, trouble voiding or hematuria. No nocturia or increased frequency. · Musculoskeletal:  No gait disturbance, weakness or joint complaints. · Integumentary: No rash or pruritis. · Neurological: No headache, diplopia, change in muscle strength, numbness or tingling. No change in gait, balance, coordination, mood, affect, memory, mentation, behavior. · Psychiatric: No anxiety or depression. · Endocrine: No temperature intolerance. No excessive thirst, fluid intake, or urination. No tremor. · Hematologic/Lymphatic: No abnormal bruising or bleeding, blood clots or swollen lymph nodes. · Allergic/Immunologic: No nasal congestion or hives. Physical Examination:    Vital Signs: BP (!) 133/96   Pulse 125   Temp 97.8 °F (36.6 °C) (Temporal)   Resp 24   Ht 5' 10\" (1.778 m)   Wt 273 lb (123.8 kg)   SpO2 93%   BMI 39.17 kg/m²   General appearance: Well preserved, mesomorphic body habitus, alert, no distress. Skin: Skin color, texture, turgor normal. No rashes or lesions. No induration or tightening palpated. Head: Normocephalic. No masses, lesions, tenderness or abnormalities  Eyes: Conjunctivae/corneas clear. PERRL, EOMs intact. Sclera non icteric. Ears: External ears normal. Canals clear. TM's clear bilaterally. Hearing normal to finger rub. Nose/Sinuses: Nares normal. Septum midline. Mucosa normal. No drainage or sinus tenderness. Oropharynx: Lips, mucosa, and tongue normal. Oropharynx clear with no exudate seen. Neck: Neck supple and symmetric. No adenopathy. Thyroid symmetric, normal size, without nodules. Trachea is midline.  Carotids brisk in upstroke without bruits, no abnormal JVP noted at 45°. Chest: Even excursion  Lungs: Lungs clear to auscultation bilaterally. No retractions or use of accessory muscles. No tactile vocal fremitus. No rhonchi, crackles or rales. Heart:  S1 > S2. Irregular, irregular rhythm. No gallop or murmur. No rub, palpable thrill or heave noted. PMI 5th intercostal space midclavicular line. Abdomen: Abdomen soft, grossly protuberant, non-tender. BS normal. No masses, organomegaly. No hernia noted. Extremities: Extremities normal. No deformities, edema, or skin discoloration. No cyanosis or clubbing noted to the nails. Peripheral pulses present 2+ upper extremities and present 1+  lower extremities. Musculoskeletal: Spine ROM normal. Muscular strength intact. Neuro: Cranial nerves intact. Motor: Strength 5/5 in all extremities. Reflexes 2+ in all extremities. No focal weakness. Sensory: grossly normal to touch. Coordination intact. Pertinent Labs:  CBC:   Recent Labs     01/15/20  0400   WBC 11.7*   HGB 13.9        BMP:  Recent Labs     01/15/20  0400 01/15/20  0530 01/15/20  0538 01/15/20  1144     --   --   --    K 7.2* 6.1*  --  5.5*   CL 95*  --   --   --    CO2 21*  --   --   --    BUN 66*  --   --   --    CREATININE 4.0*  --   --   --    GLUCOSE 127*  --  148  --    LABGLOM 15  --   --   --      ABGs:   Lab Results   Component Value Date    PH 7.424 11/30/2015    PO2 72.1 11/30/2015    PCO2 46.4 11/30/2015     INR:   No results for input(s): INR in the last 72 hours.   PRO-BNP:   Lab Results   Component Value Date    PROBNP 39,871 (H) 01/15/2020    PROBNP 8,321 (H) 01/05/2020      Cardiac Injury Profile:   Recent Labs     01/15/20  0400 01/15/20  1144   TROPONINI 0.01 0.01      Lipid Profile:   Lab Results   Component Value Date    TRIG 111 01/22/2016    HDL 24 01/22/2016    LDLCALC 47 01/22/2016    CHOL 93 01/22/2016      Hemoglobin A1C: No components found for: HGBA1C   ECG:  See report    Radiology:  [facility] Chest Portable    Result Date: 1/15/2020  Patient MRN:  [id number] : 1945 Age: 69 years Gender: Male Order Date:  1/15/2020 4:00 AM EXAM: XR CHEST PORTABLE one image INDICATION:  cp cp COMPARISON: 2020 FINDINGS: There is cardiomegaly. There is vascular congestion with the perihilar and bibasilar infiltrates and small pleural effusions. There is COPD. Mild CHF without edema. Us Retroperitoneal Complete    Result Date: 1/15/2020  Comparison: CT abdomen and pelvis 2020. Real-time and Doppler sonography of the kidneys and urinary bladder (retroperitoneum). Right kidney measures 15.1 x 6.8 x 7.9 cm. Left kidney measures 12.2 x 4.7 x 4.9 cm. Urinary bladder is not demonstrated. Anechoic lesions with posterior acoustic enhancement are present within the bilateral kidneys, largest on the right measuring up to 5.9 cm. The kidneys are negative for evidence of solid mass or hydronephrosis. There is no evidence for renal calculus disease. Bilateral renal cysts. Nonvisualization urinary bladder. Assessment:    Principal Problem:    Acute on chronic systolic CHF (congestive heart failure) ([facility])  Active Problems:    Hyperlipidemia    HTN (hypertension), benign    CKD (chronic kidney disease) stage 4, GFR 15-29 ml/min ([facility])    Atrial fibrillation with RVR (HCC)    Hyperkalemia    Lactic acidosis    Acute kidney injury superimposed on CKD (HCC)    Acute hypoxemic respiratory failure (HCC)    Prolonged Q-T interval on ECG  Resolved Problems:    * No resolved hospital problems. *      Plan:  Mr. Christiano Lai returns once again with a difficult situation and that he has significant chronic renal insufficiency noted as far back as  and multisystem disease including coronary artery disease with previous coronary artery bypass surgery as well as recurrent persistent atrial fibrillation for which he is in again for an unknown period of time and undergone a pulmonary vein isolation ablation in 2015 as well. Thus importantly we will need to. Will discuss with nephrology tomorrow a.m. in order to coordinate our efforts in Mr. Casey Begum behalf. I have spent more than 45 minutes face to face with Juana Rojas reviewing notes and laboratory data with greater than 50% of this time instructing and counseling the patient regarding my findings and recommendations and I have answered all questions as posed to me by . [place] Jack. Thank you, Rose Briscoe MD for allowing me to consult in the care of this patient. Diana White DO, FACP, FACC, [facility]    NOTE:  This report was transcribed using voice recognition software. Every effort was made to ensure accuracy; however, inadvertent computerized transcription errors may be present.

## 2020-01-16 NOTE — PROGRESS NOTES
Recent Labs     01/15/20  0400 01/15/20  1144   TROPONINI 0.01 0.01     BNP: No results for input(s): BNP in the last 72 hours. Lipids: No results for input(s): CHOL, HDL in the last 72 hours. Invalid input(s): LDLCALCU  ABGs: No results found for: PHART, PO2ART, ANX3ORU  INR: No results for input(s): INR in the last 72 hours. -----------------------------------------------------------------  US RETROPERITONEAL COMPLETE [[id number]] Resulted: 01/15/20 1719      Order Status: Completed Updated: 01/15/20 1721     Narrative:       Comparison:    CT abdomen and pelvis 2020. Real-time and Doppler sonography of the kidneys and urinary bladder  (retroperitoneum). Right kidney measures 15.1 x 6.8 x 7.9 cm. Left kidney measures 12.2 x 4.7 x 4.9 cm. Urinary bladder is not demonstrated. Anechoic lesions with posterior acoustic enhancement are present  within the bilateral kidneys, largest on the right measuring up to 5.9  cm. The kidneys are negative for evidence of solid mass or  hydronephrosis. There is no evidence for renal calculus disease.     Impression:         Bilateral renal cysts. Nonvisualization urinary bladder.     XR CHEST PORTABLE [[id number]] Resulted: 01/15/20 0717     Order Status: Completed Updated: 01/15/20 0719     Narrative:       Patient MRN:  [id number]  : 1945  Age: 69 years  Gender: Male    Order Date:  1/15/2020 4:00 AM    EXAM: XR CHEST PORTABLE one image    INDICATION:  cp   cp     COMPARISON: 2020    FINDINGS:  There is cardiomegaly. There is vascular congestion with the perihilar  and bibasilar infiltrates and small pleural effusions.  There is COPD.     Impression:       Mild CHF without edema.               Objective:   Vitals: BP (!) 130/90   Pulse 112   Temp 97.5 °F (36.4 °C) (Temporal)   Resp 20   Ht 5' 10\" (1.778 m)   Wt 273 lb (123.8 kg)   SpO2 96%   BMI 39.17 kg/m²      General appearance: alert, appears stated age and cooperative  Skin: Hyerkalemia:  - on non supplements at home   - likely diet induced  - medically treated in ER  - now w/ diarrhea   - repeat K+ 5.4 on 1/16)-pt is refusing further kayexolate     3) Acute systolic CHF: w/ elevated BNP  - ECHO (1/22/16) w/ ejection fraction is 20-25 %. - fluid restriction and stop current IVF  - initiate diuresis  - Dr Downs Skill to follow      4) A Fib:   - Cardizem in the ER  - now rate controlled   - scheduled for OP ablation soon      5) HTN: follow on current meds      6) Hypothyroid: per PCP    Cony Bailey, APRN - CNP     Patient seen and examined all key components of the physical performed independently , case discussed with NP, all pertinent labs and radiologic tests personally reviewed agree with above.    -Worsening renal function with oliguria and mild hyperkalemia; needs dialysis; he declines at this time; wants to think about it today    -Will give trial of bumex francoise Thrasher MD

## 2020-01-16 NOTE — PROGRESS NOTES
01/16/2020        Assessment/Plan:    Principal Problem:    Acute on chronic systolic CHF (congestive heart failure) ([facility])  Active Problems:    Hyperlipidemia    HTN (hypertension), benign    CKD (chronic kidney disease) stage 4, GFR 15-29 ml/min ([facility])    Atrial fibrillation with RVR ([facility])    Hyperkalemia    Lactic acidosis    Acute kidney injury superimposed on CKD ([address]    Acute hypoxemic respiratory failure (HCC)    Prolonged Q-T interval on ECG  Resolved Problems:    * No resolved hospital problems. *    I'm concerned he is getting into \"cold/wet\" physiology regarding his CHF. His renal perfusion is poor (cardiorenal syndrome) which gives a very poor prognosis  Repeat TTE is pending  Minimal UOP and worsening renal failure with IV diuretics yesterday  Discussed with Nephro and Cards - will do Bumex gtt with bolus  Monitor lytes BID while on Bumex drip  If he goes into worsening renal failure, consider inotropic support  Refuses kayexalate for hyperkalemia. Monitor for now and keep on low potassium diet.   Okay to continue Eliquis for atrial fibrillation for now despite renal dysfunction    Neville Gill    11:47 AM  1/16/2020  Cell: [phone]

## 2020-01-17 PROBLEM — E66.01 MORBID OBESITY WITH BMI OF 40.0-44.9, ADULT (HCC): Status: ACTIVE | Noted: 2020-01-01

## 2020-01-17 PROBLEM — E66.01 MORBID OBESITY WITH BMI OF 40.0-44.9, ADULT (HCC): Chronic | Status: ACTIVE | Noted: 2020-01-01

## 2020-01-17 NOTE — PROGRESS NOTES
Bk Apple Mild+ mitral regurgitation is present. Physiologic and/or trace tricuspid regurgitation. RVSP is 60 mmHg. Pulmonary hypertension is moderate . Physiologic and/or trace pulmonic regurgitation present. Technically fair quality study. Technically limited study. Technically difficult study due to patient''s body habitus. Suggest clinical correlation. Assessment/Plan:    Principal Problem:    Acute on chronic systolic CHF (congestive heart failure) ([facility])  Active Problems:    Hyperlipidemia    HTN (hypertension), benign    CKD (chronic kidney disease) stage 4, GFR 15-29 ml/min ([facility])    Atrial fibrillation with RVR ([facility])    Hyperkalemia    Lactic acidosis    Acute kidney injury superimposed on CKD ([facility]    Acute hypoxemic respiratory failure ([facility])    Prolonged Q-T interval on ECG  Resolved Problems:    * No resolved hospital problems. *    Cardiorenal syndrome. Prognosis is poor. We will try to help him live longer by initiating dialysis. For now, continue IV Bumex drip to keep him stable until we can dialyze him. Consider inotropic support if dialysis does not stabilize him  Potassium is a bit better now  Continue Eliquis and metoprolol. AF is only marginally controlled with the metoprolol 50 BID. Due to negative inotropic effects, will not increase beta blocker for now. Will d/w Cards about adding digoxin. He would prefer not to have palliative consult yet, but I did introduce the idea to him.     Steve Sommer    3:18 PM  1/17/2020  Cell: [phone]

## 2020-01-17 NOTE — PROGRESS NOTES
Dr. Lana Campbell notified via perfect serve that the patient is refusing the dialysis catheter placement.    Gerson Singh

## 2020-01-17 NOTE — PROCEDURES
Sy Rossi is a 69 y.o. male patient. 1. Atrial fibrillation with RVR ([facility]    2. Hypoxia    3. Acute on chronic combined systolic and diastolic CHF (congestive heart failure) ([facility]    4. SHARON (acute kidney injury) ([facility]    5. Hyperkalemia      Past Medical History:   Diagnosis Date    CAD (coronary artery disease)     GERD (gastroesophageal reflux disease)     HFrEF (heart failure with reduced ejection fraction) ([facility] 1/25/16 1/22/16- echo- LVEF 20-25%, mild MR, pulmonary hypertension moderate-severe, aortic valve mildly sclerotic (5/26/15- echocardiogram- LVEF 40%, left atrium moderately dilated, mild tricuspid regurgitation)    History of blood transfusion     Hyperlipidemia     Hypertension     Systolic and diastolic CHF, chronic ([facility]     8/9/15- cardiology consult combined systolic and diastolic heart failure    Toe cyanosis 12/25/2015     Blood pressure (!) 134/104, pulse 132, temperature 97.2 °F (36.2 °C), temperature source Temporal, resp. rate 28, height 5' 10\" (1.778 m), weight 276 lb 11.2 oz (125.5 kg), SpO2 92 %. Temporary Hemodialysis Catheter Insertion  Date/Time: 1/17/2020 4:37 PM  Performed by: Margot Estes MD  Authorized by: Jax Sebastian MD   Consent: Written consent obtained. Risks and benefits: risks, benefits and alternatives were discussed  Consent given by: patient and power of   Patient understanding: patient states understanding of the procedure being performed  Patient consent: the patient's understanding of the procedure matches consent given  Procedure consent: procedure consent matches procedure scheduled  Required items: required blood products, implants, devices, and special equipment available  Patient identity confirmed: verbally with patient  Time out: Immediately prior to procedure a \"time out\" was called to verify the correct patient, procedure, equipment, support staff and site/side marked as required.   Indications: vascular access  Anesthesia: local infiltration    Anesthesia:  Local Anesthetic: lidocaine 1% without epinephrine  Anesthetic total: 5 mL    Sedation:  Patient sedated: no    Preparation: skin prepped with 2% chlorhexidine  Skin prep agent dried: skin prep agent completely dried prior to procedure  Sterile barriers: all five maximum sterile barriers used - cap, mask, sterile gown, sterile gloves, and large sterile sheet  Hand hygiene: hand hygiene performed prior to central venous catheter insertion  Location details: right femoral  Patient position: flat  Catheter type: double lumen  Catheter size: 14 Fr  Pre-procedure: landmarks identified  Ultrasound guidance: yes  Sterile ultrasound techniques: sterile gel and sterile probe covers were used  Number of attempts: 1  Successful placement: yes  Post-procedure: line sutured and dressing applied  Assessment: blood return through all ports and free fluid flow  Patient tolerance: Patient tolerated the procedure well with no immediate complications          James House MD  1/17/2020

## 2020-01-17 NOTE — PROGRESS NOTES
01/15/20  1144 20  0522 20  1946     --   --   --  139 137   K 7.2*   < >  --  5.5* 5.4* 5.3*   CL 95*  --   --   --  91* 91*   CO2 21*  --   --   --  20* 28   BUN 66*  --   --   --  82* 93*   CREATININE 4.0*  --   --   --  4.7* 5.3*   GLUCOSE 127*  --  148  --  117* 118*    < > = values in this interval not displayed. Hepatic:   Recent Labs     01/15/20  0400   *   ALT 45*   BILITOT 1.7*   ALKPHOS 58     Troponin:   Recent Labs     01/15/20  0400 01/15/20  1144   TROPONINI 0.01 0.01     BNP: No results for input(s): BNP in the last 72 hours. Lipids: No results for input(s): CHOL, HDL in the last 72 hours. Invalid input(s): LDLCALCU  ABGs: No results found for: PHART, PO2ART, ORS1UMY  INR: No results for input(s): INR in the last 72 hours. -----------------------------------------------------------------  US RETROPERITONEAL COMPLETE [[id number]] Resulted: 01/15/20 1719      Order Status: Completed Updated: 01/15/20 1721     Narrative:       Comparison:    CT abdomen and pelvis 2020. Real-time and Doppler sonography of the kidneys and urinary bladder  (retroperitoneum). Right kidney measures 15.1 x 6.8 x 7.9 cm. Left kidney measures 12.2 x 4.7 x 4.9 cm. Urinary bladder is not demonstrated. Anechoic lesions with posterior acoustic enhancement are present  within the bilateral kidneys, largest on the right measuring up to 5.9  cm. The kidneys are negative for evidence of solid mass or  hydronephrosis. There is no evidence for renal calculus disease.     Impression:         Bilateral renal cysts.     Nonvisualization urinary bladder.     XR CHEST PORTABLE [[id number]] Resulted: 01/15/20 0717     Order Status: Completed Updated: 01/15/20 0719     Narrative:       Patient MRN:  [id number]  : 1945  Age: 69 years  Gender: Male    Order Date:  1/15/2020 4:00 AM    EXAM: XR CHEST PORTABLE one image    INDICATION:  cp   cp     COMPARISON: 1/5/2020    FINDINGS:  There is cardiomegaly. There is vascular congestion with the perihilar  and bibasilar infiltrates and small pleural effusions. There is COPD.     Impression:       Mild CHF without edema.               Objective:   Vitals: /78   Pulse 110   Temp 97.4 °F (36.3 °C) (Temporal)   Resp 18   Ht 5' 10\" (1.778 m)   Wt 276 lb 11.2 oz (125.5 kg)   SpO2 94%   BMI 39.70 kg/m²      General appearance: alert, appears stated age and cooperative  Skin: Skin color, texture, turgor normal. No rashes or lesions  HEENT: Head: Normocephalic, no lesions, without obvious abnormality. Neck: no carotid bruit and no JVD  Lungs: clear, diminished in bases  Heart:  S1, S2, no m/r/g  Abdomen: large, soft , non-distended, non-tender, BS +  Extremities: +1 ble  Neurologic: Mental status: alert to name, confused to time and place. Assessment:   Patient Active Problem List:     Hyperlipidemia     CAD (coronary artery disease)     S/P CABG (coronary artery bypass graft)     Obesities, morbid ([facility])     CARLTON (obstructive sleep apnea)     Abdominal aortic aneurysm without rupture ([facility])     Acute on chronic systolic CHF (congestive heart failure) ([facility])     Chronic kidney disease, stage III (moderate) ([facility])     HTN (hypertension), benign     Acute decompensated heart failure ([facility])     Left ventricular systolic dysfunction     CKD (chronic kidney disease) stage 4, GFR 15-29 ml/min ([facility])     Unable to ambulate     Left inguinal pain     Non-recurrent unilateral inguinal hernia without obstruction or gangrene     Atrial fibrillation with RVR ([facility])     Hyperkalemia     Lactic acidosis     Acute kidney injury superimposed on CKD ([address]     Acute hypoxemic respiratory failure (HCC)     Prolonged Q-T interval on ECG    Plan:     1) Acute kidney on CKD Stage 3 (remote Scr  Jan 2016 was 2.4 - 2. 9):   - now elevated in the setting of decreased renal perfusion w/ A-fib and CHF   - patient presents w/ volume overload and will need aggressive diuresis    - renal imagin19 (Kidneys are negative for evidence of solid mass, hydronephrosis or calculus disease - Bilateral renal cysts are present measuring up to 5.4 cm on the right and 2.1 cm on the left) - in the past cysts present w/ CT of the abdomen on 12/23/15   -  FeNa 0.6%  - plan to follow labs and daily I&O  - roche cath:  UOP 1L over past 2 shifts  - UA mod blood  -  Ur Cl < 20, Ur Cr 147, .9, Ur K+ 58, Ur Na 30. FeNa < 1%    - avoid nephrotoxins  - more confused, GFR 10%, sCr 5.3  - pt now in agreement for hemodialysis  -order placed for placement of temp dialysis catheter  tessio.    2) Hyerkalemia:  - on no supplements at home   - likely diet induced  - medically treated in ER  - now w/ diarrhea   - repeat K+ 5.3 on      3) Acute systolic CHF: w/ elevated BNP  - ECHO (16) w/ ejection fraction is 20-25 %. - fluid restriction and stop current IVF  - continue diuresis IV bumex transition to oral torsemide     4) A Fib:   - Cardizem in the ER  - now rate controlled   - scheduled for OP ablation soon      5) HTN: follow on current meds      6) Hypothyroid: per PCP        MATT Stoddard - CNP     Patient seen and examined all key components of the physical performed independently , case discussed with NP, all pertinent labs and radiologic tests personally reviewed agree with above. Agrees to proceed with dialysis; son present; will start once temp HD cath is in place    Suha Catalan MD     Department of Internal Medicine  Section of Nephrology  Dialysis Note        PROCEDURE:  Patient seen on hemodialysis   PHYSICIAN[facility]  [facility]rakash M.D., [facility]    INDICATION:  Acute renal failure    RX:  See dialysis flowsheet for specifics on access, blood flow rate, dialysate baths, duration of dialysis, anticoagulation and other technical information.     COMMENTS:  Procedure in progress and tolerated       Suha Catalan MD

## 2020-01-17 NOTE — PROGRESS NOTES
PROGRESS NOTE       PATIENT PROBLEM LIST:  Principal Problem:    Acute on chronic systolic CHF (congestive heart failure) ([facility])  Active Problems:    Hyperlipidemia    HTN (hypertension), benign    CKD (chronic kidney disease) stage 4, GFR 15-29 ml/min (HCC)    Atrial fibrillation with RVR (HCC)    Hyperkalemia    Lactic acidosis    Acute kidney injury superimposed on CKD (HCC)    Acute hypoxemic respiratory failure (HCC)    Prolonged Q-T interval on ECG  Resolved Problems:    * No resolved hospital problems. *      SUBJECTIVE:  Noel Moreland states he is breathing somewhat better this evening and is lying supine without respiratory distress. His sons are at his bedside. Denies any chest discomfort nor palpitations presently. REVIEW OF SYSTEMS:  General ROS: positive for - fatigue and weight gain. Psychological ROS: negative for - anxiety , depression  Ophthalmic ROS: Positive for - decreased vision utilizes corrective lenses for visual acuity. ENT ROS: negative  Allergy and Immunology ROS: negative  Hematological and Lymphatic ROS: negative  Endocrine: no heat or cold intolerance and no polyphagia, polydipsia, or polyuria  Respiratory ROS: positive for - cough and shortness of breath  Cardiovascular ROS: positive for - chest pain, dyspnea on exertion and shortness of breath. Gastrointestinal ROS: no abdominal pain, change in bowel habits, or black or bloody stools  Genito-Urinary ROS: no nocturia, dysuria, trouble voiding, frequency or hematuria  Musculoskeletal ROS: negative for- myalgias, arthralgias, or claudication  Neurological ROS: no TIA or stroke symptoms otherwise no significant change in symptoms or problems since yesterday as documented in previous progress notes.     SCHEDULED MEDICATIONS:   apixaban  5 mg Oral BID    buPROPion  150 mg Oral QAM    isosorbide mononitrate  60 mg Oral Daily    levothyroxine  25 mcg Oral Daily    melatonin  5 mg Oral Daily    metoprolol tartrate  50 mg Oral BID 7. 2* 6.1* 5.5* 5.4* 5.3*   CL 95*  --   --  91* 91*   CO2 21*  --   --  20* 28   BUN 66*  --   --  82* 93*   CREATININE 4.0*  --   --  4.7* 5.3*   LABGLOM 15  --   --  12 11     ABGs:   Lab Results   Component Value Date    PH 7.424 2015    PO2 72.1 2015    PCO2 46.4 2015     INR: No results for input(s): INR in the last 72 hours. PRO-BNP:   Lab Results   Component Value Date    PROBNP 39,871 (H) 01/15/2020    PROBNP 8,321 (H) 2020      TSH:   Lab Results   Component Value Date    TSH 5.570 (H) 2016      Cardiac Injury Profile:   Recent Labs     01/15/20  0400 01/15/20  1144   TROPONINI 0.01 0.01      Lipid Profile:   Lab Results   Component Value Date    TRIG 111 2016    HDL 24 2016    LDLCALC 47 2016    CHOL 93 2016      Hemoglobin A1C: No components found for: HGBA1C     RAD:   Ct Abdomen Pelvis Wo Contrast    Result Date: 2020  Patient MRN:  [id number] : 1945 Age: 69 years Gender: Male Order Date:  2020 8:00 PM EXAM: CT ABDOMEN PELVIS WO CONTRAST COMPARISON: 2015 INDICATION:  Pain to groin area on Left side Pain to groin area on Left side TECHNIQUE:  Low-dose CT acquisition technique included one of the following options; 1. Automated exposure control, 2. Adjustment of mA and/or kV according to the patient's size or 3. Use of iterative reconstruction. FINDINGS: No renal or ureteric calculus. No hydronephrosis. There are multiple cysts associated with both kidneys appearing similar in size and number compared to previous examination measuring up to 6.3 cm on the right than 2.5 cm on the left. Stable cyst or hemangioma associated with the anterior segment of the right hepatic lobe which measures 1.7 cm. No intrahepatic or extra prior bile duct dilatation. Gallbladder is unremarkable. No evidence of acute pancreatitis. Spleen is nonenlarged. Numerous diverticula associated with sigmoid colon. No evidence of acute diverticulitis.  There Complete    Result Date: 12/23/2019  Real-time and Doppler sonography of the kidneys and urinary bladder (retroperitoneum). Clinical indications: Acute renal insufficiency. COMPARISON: CT abdomen and pelvis December 23, 2015 showing bilateral renal cysts. FINDINGS: Kidneys are negative for evidence of solid mass, hydronephrosis or calculus disease. Bilateral renal cysts are present measuring up to to 5.4 cm on the right and 2.1 cm on the left. Urinary bladder is evacuated. Ureteral jets were seen. Bilateral renal cysts. Urinary bladder is not evaluated. EKG: See Report  Echo: See Report      IMPRESSIONS:  Principal Problem:    Acute on chronic systolic CHF (congestive heart failure) ([facility])  Active Problems:    Hyperlipidemia    HTN (hypertension), benign    CKD (chronic kidney disease) stage 4, GFR 15-29 ml/min (HCC)    Atrial fibrillation with RVR (HCC)    Hyperkalemia    Lactic acidosis    Acute kidney injury superimposed on CKD (HCC)    Acute hypoxemic respiratory failure (HCC)    Prolonged Q-T interval on ECG  Resolved Problems:    * No resolved hospital problems. *      RECOMMENDATIONS:  Continue present medical regimen including intravenous bumetanide drip and transition to oral torsemide and carefully monitor renal function. Two-dimensional echocardiogram demonstrates further deterioration of left ventricular systolic function. I have spent more than 28 minutes face to face with Ro Martinez and reviewing notes and laboratory data, with greater than 50% of this time instructing and counseling the patient and his 2 sons face to face regarding my findings and recommendations and I have answered all questions as posed to me by Mr. Miguel Browne and his sons. Yuriy Ceja, DO FACP,FACC,[facility]      NOTE:  This report was transcribed using voice recognition software.   Every effort was made to ensure accuracy; however, inadvertent computerized transcription errors may be present Statement Selected

## 2020-01-17 NOTE — CONSULTS
[facility]    ECHO COMPL W DOP COLOR FLOW  2015            Current Medications:    bumetanide 0.1 mg/mL infusion 1.5 mg/hr (20 1016)    dextrose        glucose, dextrose, glucagon (rDNA), dextrose, sodium chloride flush, magnesium hydroxide, acetaminophen, zolpidem, calcium carbonate    melatonin  5 mg Oral Nightly    apixaban  5 mg Oral BID    buPROPion  150 mg Oral QAM    isosorbide mononitrate  60 mg Oral Daily    levothyroxine  25 mcg Oral Daily    metoprolol tartrate  50 mg Oral BID    sodium chloride flush  10 mL Intravenous 2 times per day    aspirin  81 mg Oral Daily        Allergies:  Crestor [rosuvastatin calcium]    Social History     Socioeconomic History    Marital status:      Spouse name: Not on file    Number of children: Not on file    Years of education: Not on file    Highest education level: Not on file   Occupational History    Not on file   Social Needs    Financial resource strain: Not on file    Food insecurity:     Worry: Not on file     Inability: Not on file    Transportation needs:     Medical: Not on file     Non-medical: Not on file   Tobacco Use    Smoking status: Former Smoker     Last attempt to quit: 2005     Years since quittin.7    Smokeless tobacco: Never Used   Substance and Sexual Activity    Alcohol use: No    Drug use: No    Sexual activity: Not on file   Lifestyle    Physical activity:     Days per week: Not on file     Minutes per session: Not on file    Stress: Not on file   Relationships    Social connections:     Talks on phone: Not on file     Gets together: Not on file     Attends Jain service: Not on file     Active member of club or organization: Not on file     Attends meetings of clubs or organizations: Not on file     Relationship status: Not on file    Intimate partner violence:     Fear of current or ex partner: Not on file     Emotionally abused: Not on file     Physically abused: Not on file

## 2020-01-18 NOTE — PROGRESS NOTES
Unable to reach Dr. John Berry who is on call for Dr. Mauri Fields. Nallely is not working. jailyn Pelayo needs addressed as the pt is still bleeding from urethra. Traction to be applied to roche in the mean time.

## 2020-01-18 NOTE — CONSULTS
INPATIENT CONSULTATION RECORD FOR  1/18/2020      [facility]  [address] [address]. Richard Ruiz, [address] [facility]  [phone]        REASON FOR CONSULTATION: Elevated PSA/Traumatic gross hematuria/Bilateral renal cysts      HISTORY OF PRESENT ILLNESS:      The patient is a 69 y.o. male patient who was admitted recently with increased lower extremity edema and shortness of breath. He is on chronic anticoagulation with Eliquis for atrial fib. He is followed by Dr. Pratima Kuhn. He had a catheter placed sometime during this admission. For whatever reason, he decided to pull the catheter out last evening. He was noted to have gross hematuria shortly thereafter. The catheter team reinserted a three-way hematuria catheter and his hematuria is slowly resolving. He has never had hematuria before. He actually was seen in the office he states a few weeks ago by my brother for an elevated PSA. He was examined and told to repeat the PSA in several months. He has no voiding difficulties. He denies dysuria UTIs kidney stones otherwise. He recently started hemodialysis.   CT scan on 1/8/2019 showed diverticulosis, bilateral renal cysts, and infrarenal abdominal aortic aneurysm, and a left inguinal hernia      Past Medical History:   Diagnosis Date    CAD (coronary artery disease)     GERD (gastroesophageal reflux disease)     HFrEF (heart failure with reduced ejection fraction) ([address] 1/25/16 1/22/16- echo- LVEF 20-25%, mild MR, pulmonary hypertension moderate-severe, aortic valve mildly sclerotic (5/26/15- echocardiogram- LVEF 40%, left atrium moderately dilated, mild tricuspid regurgitation)    History of blood transfusion     Hyperlipidemia     Hypertension     Systolic and diastolic CHF, chronic ([address]     8/9/15- cardiology consult combined systolic and diastolic heart failure    Toe cyanosis 12/25/2015   Elevated PSA      Past Surgical History:   Procedure Laterality Date    CARDIAC SURGERY      CORONARY ARTERY BYPASS GRAFT  10/4/2005    [id number]    Dr. Rito [facility], [facility]    ECHO COMPL W DOP COLOR FLOW  5/26/2015            Medications Prior to Admission:    Medications Prior to Admission: hydrALAZINE (APRESOLINE) 10 MG tablet, Take 10 mg by mouth 3 times daily  triamcinolone (KENALOG) 0.1 % ointment, Apply topically 2 times daily Apply topically 2 times daily. melatonin 5 MG TABS tablet, Take 5 mg by mouth daily  polyethylene glycol ([employer] PURELAX) powder, Take 17 g by mouth daily as needed   acetaminophen (TYLENOL) 500 MG tablet, Take 2 tablets by mouth every 6 hours as needed for Pain  docusate sodium (COLACE) 100 MG capsule, Take 1 capsule by mouth 2 times daily  magnesium hydroxide (MILK OF MAGNESIA) 400 MG/5ML suspension, Take 30 mLs by mouth daily as needed for Constipation  metoprolol tartrate (LOPRESSOR) 25 MG tablet, Take 2 tablets by mouth 2 times daily  isosorbide mononitrate (IMDUR) 60 MG extended release tablet, Take 1 tablet by mouth daily  apixaban (ELIQUIS) 5 MG TABS tablet, Take 1 tablet by mouth 2 times daily  buPROPion (WELLBUTRIN XL) 150 MG extended release tablet, Take 150 mg by mouth every morning  levothyroxine (SYNTHROID) 25 MCG tablet, Take 1 tablet by mouth Daily  tiZANidine (ZANAFLEX) 4 MG tablet,  Take 4 mg by mouth every 8 hours as needed     Allergies:    Crestor [rosuvastatin calcium]    Social History:   He  reports that he quit smoking about 14 years ago. He has never used smokeless tobacco. He reports that he does not drink alcohol or use drugs. He is  and has 2 sons. He is retired from [place]. He used to live in [facility]    Family History:   Non-contributory to this Urological problem  family history includes Cancer in his father; Coronary Art Dis in his brother; Heart Disease in his maternal grandfather and maternal uncle; High Cholesterol in his brother; Other in his mother.     REVIEW OF SYSTEMS:  Respiratory: denies any symptoms of a productive cough, shortness of breath, or hemoptysis  Cardiovascular: denies chest pain, dyspnea, or cardiac murmur  Gastrointestinal: negative for abdominal pain, diarrhea, or constipation  Dermatologic: denies any rashes, skin lesion(s), or pruritis  Neurological: negative for headaches, seizures, and tremors  Endocrine: negative for diabetic symptoms including polydipsia and polyuria or thyroid dysfunction  Ocular: denies retinopathy or glaucoma  ENT: denies sinusitis or epistaxis  Constitutional: denies nausea, vomiting, fever, or chills  Psychiatric: denies anxiety or depression  : denies prior hematuria, UTIs, stone disease    PHYSICAL EXAM:    Vitals:  /78   Pulse 98   Temp 97.3 °F (36.3 °C) (Temporal)   Resp 22   Ht 5' 10\" (1.778 m)   Wt 270 lb 11.6 oz (122.8 kg)   SpO2 98%   BMI 38.84 kg/m²     General:  Awake, alert, oriented X 3. Well developed, well nourished, well groomed. No apparent distress. HEENT:  Normocephalic, atraumatic. Pupils equal, round. No scleral icterus. No conjunctival injection. Normal lips, teeth, and gums. No nasal discharge. Neck:  Supple, no masses. Heart: Irregularly irregular  Lungs:  No audible wheezing. Respirations symmetric and non-labored. Clear bilaterally. Abdomen:  Soft, nontender, no masses, no organomegaly, no peritoneal signs. Active bowel sounds. No rebound or guarding. No flank or CVA tenderness. Extremities:  No clubbing, cyanosis, or edema. Brisk peripheral pulses. Skin:  Warm and dry, no open lesions or rashes. Neuro:  Cranial nerves 2-12 intact, no focal motor or sensory deficits. Alert and oriented. Rectal: Deferred  Genitalia: 25 [place] three-way De Paz hematuria catheter draining clear yellow urine into a gravity bag. There is small amount of blood oozing around the catheter from the meatus which is stable. The third port on the catheter was plugged. Circumcised male without lesions or deformity.   His testes are both normal in size and

## 2020-01-18 NOTE — PROGRESS NOTES
Dr. Jelly Larkin paged through the answering service per urology if ok to hold eliquis at this time. Dr. Jelly Larkin returned call and states ok to hold eliquis at this time.    Guadalupe Siemens

## 2020-01-18 NOTE — PROGRESS NOTES
in apparent acute distress. LUNGS:  No obvious increased work of breathing, clear to auscultation bilaterally. CARDIOVASCULAR:  S1 and S2 irregular to auscultate. ABDOMEN:  Soft, . Bowel sounds present  SKIN: chronic lesions+    Data    Recent Labs     20  1138   WBC 10.5   HGB 11.9*        Recent Labs     20  0522 20  1946 20  0745 20  1143 20  2221    137 134  --  134   K 5.4* 5.3* 5.5* 4.9 4.1   CL 91* 91* 87*  --  89*   CO2 20* 28 22  --  26   PHOS 6.7*  --   --   --   --    BUN 82* 93* 95*  --  66*   CREATININE 4.7* 5.3* 5.5*  --  4.6*   GLUCOSE 117* 118* 120*  --  104*     Recent Labs     01/15/20  1144   TROPONINI 0.01       Xr Chest Portable    Result Date: 1/15/2020  Patient MRN:  [id number] : 1945 Age: 69 years Gender: Male Order Date:  1/15/2020 4:00 AM EXAM: XR CHEST PORTABLE one image INDICATION:  cp cp COMPARISON: 2020 FINDINGS: There is cardiomegaly. There is vascular congestion with the perihilar and bibasilar infiltrates and small pleural effusions. There is COPD. Mild CHF without edema. Us Retroperitoneal Complete    Result Date: 1/15/2020  Comparison: CT abdomen and pelvis 2020. Real-time and Doppler sonography of the kidneys and urinary bladder (retroperitoneum). Right kidney measures 15.1 x 6.8 x 7.9 cm. Left kidney measures 12.2 x 4.7 x 4.9 cm. Urinary bladder is not demonstrated. Anechoic lesions with posterior acoustic enhancement are present within the bilateral kidneys, largest on the right measuring up to 5.9 cm. The kidneys are negative for evidence of solid mass or hydronephrosis. There is no evidence for renal calculus disease. Bilateral renal cysts. Nonvisualization urinary bladder.       Medications     dextrose        bumetanide  1 mg Intravenous BID    melatonin  5 mg Oral Nightly    apixaban  5 mg Oral BID    buPROPion  150 mg Oral QAM    isosorbide mononitrate  60 mg Oral Daily    levothyroxine  25 mcg Oral Daily    metoprolol tartrate  50 mg Oral BID    sodium chloride flush  10 mL Intravenous 2 times per day    aspirin  81 mg Oral Daily      DIET RENAL;    ASSESSMENT/PLAN:  -Acute on chronic systolic CHF (congestive heart failure     Cardiorenal syndrome  -Acute kidney injury superimposed on CKD   -Atrial fibrillation with RVR   -Hyperkalemia  -HTN (hypertension), benign  -Hyperlipidemia  -Acute hypoxemic respiratory failure (HCC)  -Morbid obesity      PLAN:  On dialysis Continue Eliquis and B blocker for A fib  Cardio and renal following      Electronically signed by Melany Pino MD on 1/18/2020 at 11:43 AM

## 2020-01-18 NOTE — PROGRESS NOTES
PROGRESS NOTE       PATIENT PROBLEM LIST:  Principal Problem:    Acute on chronic systolic CHF (congestive heart failure) ([facility])  Active Problems:    Hyperlipidemia    HTN (hypertension), benign    CKD (chronic kidney disease) stage 4, GFR 15-29 ml/min ([facility])    Atrial fibrillation with RVR (HCC)    Hyperkalemia    Lactic acidosis    Acute kidney injury superimposed on CKD (HCC)    Acute hypoxemic respiratory failure (HCC)    Prolonged Q-T interval on ECG  Resolved Problems:    * No resolved hospital problems. *      SUBJECTIVE:  Dilcia Guallpa states he feels very tired and weak. Denies any chest discomfort nor significant shortness of breath presently. His renal function continues to deteriorate. REVIEW OF SYSTEMS:  General ROS: positive for - fatigue and weight gain. Psychological ROS: negative for - anxiety , depression  Ophthalmic ROS: Positive for - decreased vision utilizes corrective lenses for visual acuity. ENT ROS: negative  Allergy and Immunology ROS: negative  Hematological and Lymphatic ROS: negative  Endocrine: no heat or cold intolerance and no polyphagia, polydipsia, or polyuria  Respiratory ROS: positive for - cough and shortness of breath  Cardiovascular ROS: positive for - chest pain, dyspnea on exertion and shortness of breath. Gastrointestinal ROS: no abdominal pain, change in bowel habits, or black or bloody stools  Genito-Urinary ROS: no nocturia, dysuria, trouble voiding, frequency or hematuria  Musculoskeletal ROS: negative for- myalgias, arthralgias, or claudication  Neurological ROS: no TIA or stroke symptoms otherwise no significant change in symptoms or problems since yesterday as documented in previous progress notes.     SCHEDULED MEDICATIONS:   melatonin  5 mg Oral Nightly    apixaban  5 mg Oral BID    buPROPion  150 mg Oral QAM    isosorbide mononitrate  60 mg Oral Daily    levothyroxine  25 mcg Oral Daily    metoprolol tartrate  50 mg Oral BID    sodium chloride flush FACP,FACC,FSCAI      NOTE:  This report was transcribed using voice recognition software.   Every effort was made to ensure accuracy; however, inadvertent computerized transcription errors may be present

## 2020-01-18 NOTE — FLOWSHEET NOTE
01/17/20 2053   Vital Signs   BP (!) 160/101   Temp 97.4 °F (36.3 °C)   Pulse 92   Resp 18   Height 5' 10\" (1.778 m)   Weight 278 lb 10.6 oz (126.4 kg)   Weight Method Bed scale   Percent Weight Change -1.33   Pain Assessment   Pain Assessment 0-10   Pain Level 0   Post-Hemodialysis Assessment   Post-Treatment Procedures Blood returned;Catheter capped, clamped with Citrate x 2 ports   Machine Disinfection Process Acid/Vinegar Clean;Exterior Machine Disinfection;Bleach; Machine Absence of Bleach Machine   Rinseback Volume (ml) 300 ml   Total Liters Processed (l/min) 28.7 l/min   Dialyzer Clearance Lightly streaked   Duration of Treatment (minutes) 15 minutes   Hemodialysis Intake (ml) 300 ml   Hemodialysis Output (ml) 2000 ml   NET Removed (ml) 1700 ml   Tolerated Treatment Good   Patient Response to Treatment tolerated well, dialysis completed, pt blood returned, cath care given, both ports of cath flushed c 10 cc ns, citrate instilled to fill volume, caps applied   Bilateral Breath Sounds Diminished   Edema Generalized   LUE Edema +2   RLE Edema +2;Pitting   LLE Edema +2;Pitting   Physician Notified?  No

## 2020-01-18 NOTE — PROGRESS NOTES
MRN:  [id number] : 1945 Age: 69 years Gender: Male Order Date:  1/15/2020 4:00 AM EXAM: XR CHEST PORTABLE one image INDICATION:  cp cp COMPARISON: 2020 FINDINGS: There is cardiomegaly. There is vascular congestion with the perihilar and bibasilar infiltrates and small pleural effusions. There is COPD. Mild CHF without edema. Us Retroperitoneal Complete    Result Date: 1/15/2020  Comparison: CT abdomen and pelvis 2020. Real-time and Doppler sonography of the kidneys and urinary bladder (retroperitoneum). Right kidney measures 15.1 x 6.8 x 7.9 cm. Left kidney measures 12.2 x 4.7 x 4.9 cm. Urinary bladder is not demonstrated. Anechoic lesions with posterior acoustic enhancement are present within the bilateral kidneys, largest on the right measuring up to 5.9 cm. The kidneys are negative for evidence of solid mass or hydronephrosis. There is no evidence for renal calculus disease. Bilateral renal cysts. Nonvisualization urinary bladder. Objective:   Vitals: BP (!) 148/107   Pulse 103   Temp 97.2 °F (36.2 °C)   Resp 22   Ht 5' 10\" (1.778 m)   Wt 270 lb 11.6 oz (122.8 kg)   SpO2 96%   BMI 38.84 kg/m²   General appearance: appears stated age   Skin:  No rashes or lesions  HEENT: Head: Normocephalic, no lesions, without obvious abnormality.   Neck: no adenopathy, no carotid bruit, no JVD, supple, symmetrical, trachea midline and thyroid not enlarged, symmetric, no tenderness/mass/nodules  Lungs: clear to auscultation bilaterally  Heart: regular rate and rhythm, S1, S2 normal, no murmur, click, rub or gallop  Abdomen: soft, non-tender; bowel sounds normal; no masses,  no organomegaly  Extremities: edema ++  Neurologic: Mental status: Alert, oriented, thought content appropriate    Assessment:   Patient Active Problem List:     Hyperlipidemia     CAD (coronary artery disease)     S/P CABG (coronary artery bypass graft)     Obesities, morbid (HCC)     CARLTON (obstructive sleep apnea) scheduled for OP ablation soon      5) HTN: follow on current meds      6) Hypothyroid: per PCP    Good u/o with bumex drip , UF on dialysis as tolerated   Vol overload better     Mike Najera

## 2020-01-19 NOTE — PROGRESS NOTES
Internal Medicine   Progress Note    Admit Date: 1/15/2020  Hospital day:    Hospital Day: 5  SUBJECTIVE:  No new acute problems overnight. Doing OK. No chest pain. + shortness of breath. No nausea or vomiting. No fever. No abdominal pain. De Paz reinserted  OBJECTIVE:     /77   Pulse 110   Temp 98.4 °F (36.9 °C) (Oral)   Resp 20   Ht 5' 10\" (1.778 m)   Wt 268 lb 8.3 oz (121.8 kg)   SpO2 94%   BMI 38.53 kg/m²   Patient Vitals for the past 24 hrs:   BP Temp Temp src Pulse Resp SpO2 Weight   20 1015 121/77 98.4 °F (36.9 °C) Oral 110 20 94 % --   20 0047 -- -- -- -- -- -- 268 lb 8.3 oz (121.8 kg)   20 2327 (!) 144/90 97.7 °F (36.5 °C) Oral 106 18 97 % --   20 2115 115/70 -- -- 106 -- 93 % --   20 1515 116/78 97.3 °F (36.3 °C) Temporal 98 22 98 % --     24HR INTAKE/OUTPUT:      Intake/Output Summary (Last 24 hours) at 2020 1217  Last data filed at 2020 1015  Gross per 24 hour   Intake 660 ml   Output 4850 ml   Net -4190 ml      GENERAL: Alert, breathing easily, not in apparent acute distress. LUNGS:  No obvious increased work of breathing, clear to auscultation bilaterally. CARDIOVASCULAR:  S1 and S2 regular to auscultate. ABDOMEN:  Soft, non-tender. Bowel sounds present    Data      Recent Labs     20  1138 20   WBC 10.5 9.6   HGB 11.9* 11.7*    257     Recent Labs     20  1324 20  0824    142 142   K 4.1 4.1 4.0   CL 94* 97* 96*   CO2 27 31* 30*   BUN 48* 52* 54*   CREATININE 3.1* 3.6* 3.5*   GLUCOSE 107* 113* 118*     Xr Chest Portable    Result Date: 1/15/2020  Patient MRN:  [id number] : 1945 Age: 69 years Gender: Male Order Date:  1/15/2020 4:00 AM EXAM: XR CHEST PORTABLE one image INDICATION:  cp cp COMPARISON: 2020 FINDINGS: There is cardiomegaly. There is vascular congestion with the perihilar and bibasilar infiltrates and small pleural effusions. There is COPD.      Mild CHF without edema.     Us Retroperitoneal Complete    Result Date: 1/15/2020  Comparison: CT abdomen and pelvis January 5, 2020. Real-time and Doppler sonography of the kidneys and urinary bladder (retroperitoneum). Right kidney measures 15.1 x 6.8 x 7.9 cm. Left kidney measures 12.2 x 4.7 x 4.9 cm. Urinary bladder is not demonstrated. Anechoic lesions with posterior acoustic enhancement are present within the bilateral kidneys, largest on the right measuring up to 5.9 cm. The kidneys are negative for evidence of solid mass or hydronephrosis. There is no evidence for renal calculus disease. Bilateral renal cysts. Nonvisualization urinary bladder.       Medications     dextrose        bumetanide  1 mg Intravenous BID    melatonin  5 mg Oral Nightly    apixaban  5 mg Oral BID    buPROPion  150 mg Oral QAM    isosorbide mononitrate  60 mg Oral Daily    levothyroxine  25 mcg Oral Daily    metoprolol tartrate  50 mg Oral BID    sodium chloride flush  10 mL Intravenous 2 times per day    aspirin  81 mg Oral Daily      DIET RENAL;    ASSESSMENT/PLAN:  -Acute on chronic systolic CHF (congestive heart failure     Cardiorenal syndrome  -Acute kidney injury superimposed on CKD   -Atrial fibrillation with RVR   -Hyperkalemia  -HTN (hypertension), benign  -Hyperlipidemia  -Acute hypoxemic respiratory failure (HCC)  -Morbid obesity   -Traumatic hematuria and renal cyst - uro following  On dialysis Continue Eliquis and B blocker for A fib  Cardio and renal following      Electronically signed by Juanita Ring MD on 1/19/2020 at 12:17 PM

## 2020-01-19 NOTE — PROGRESS NOTES
[facility]  PROGRESS NOTE    Chief Complaint:  Elevated PSA/Traumatic gross hematuria/Bilateral renal cysts  HPI: No catheter issues overnight. He remains alert and oriented.   His bladder spasms have improved    Vitals:    01/19/20 1015   BP: 121/77   Pulse: 110   Resp: 20   Temp: 98.4 °F (36.9 °C)   SpO2: 94%       Allergies: Crestor [rosuvastatin calcium]    PAST MEDICAL HISTORY:   Past Medical History:   Diagnosis Date    CAD (coronary artery disease)     GERD (gastroesophageal reflux disease)     HFrEF (heart failure with reduced ejection fraction) ([facility] 1/25/16 1/22/16- echo- LVEF 20-25%, mild MR, pulmonary hypertension moderate-severe, aortic valve mildly sclerotic (5/26/15- echocardiogram- LVEF 40%, left atrium moderately dilated, mild tricuspid regurgitation)    History of blood transfusion     Hyperlipidemia     Hypertension     Systolic and diastolic CHF, chronic ([facility]     8/9/15- cardiology consult combined systolic and diastolic heart failure    Toe cyanosis 12/25/2015       PAST SURGICAL HISTORY:   Past Surgical History:   Procedure Laterality Date    CARDIAC SURGERY      CORONARY ARTERY BYPASS GRAFT  10/4/2005    [id number]    Dr. Holland [facility], [facility]    ECHO COMPL W DOP COLOR FLOW  5/26/2015             PAST FAMILY HISTORY:    Family History   Problem Relation Age of Onset    Other Mother     Cancer Father     High Cholesterol Brother     Coronary Art Dis Brother     Heart Disease Maternal Uncle     Heart Disease Maternal Grandfather        PAST SOCIAL HISTORY:    Social History     Socioeconomic History    Marital status:      Spouse name: Not on file    Number of children: Not on file    Years of education: Not on file    Highest education level: Not on file   Occupational History    Not on file   Social Needs    Financial resource strain: Not on file    Food insecurity:     Worry: Not on file     Inability: Not on file    Transportation needs:     Medical: Not on file Non-medical: Not on file   Tobacco Use    Smoking status: Former Smoker     Last attempt to quit: 2005     Years since quittin.7    Smokeless tobacco: Never Used   Substance and Sexual Activity    Alcohol use: No    Drug use: No    Sexual activity: Not on file   Lifestyle    Physical activity:     Days per week: Not on file     Minutes per session: Not on file    Stress: Not on file   Relationships    Social connections:     Talks on phone: Not on file     Gets together: Not on file     Attends Anabaptist service: Not on file     Active member of club or organization: Not on file     Attends meetings of clubs or organizations: Not on file     Relationship status: Not on file    Intimate partner violence:     Fear of current or ex partner: Not on file     Emotionally abused: Not on file     Physically abused: Not on file     Forced sexual activity: Not on file   Other Topics Concern    Not on file   Social History Narrative    Not on file       IV:    dextrose         PRN: opium-belladonna, glucose, dextrose, glucagon (rDNA), dextrose, sodium chloride flush, magnesium hydroxide, acetaminophen, zolpidem, calcium carbonate    Scheduled:    bumetanide  1 mg Intravenous BID    melatonin  5 mg Oral Nightly    apixaban  5 mg Oral BID    buPROPion  150 mg Oral QAM    isosorbide mononitrate  60 mg Oral Daily    levothyroxine  25 mcg Oral Daily    metoprolol tartrate  50 mg Oral BID    sodium chloride flush  10 mL Intravenous 2 times per day    aspirin  81 mg Oral Daily       Lab Results   Component Value Date     2020    K 4.0 2020    K 5.4 2020    BUN 54 2020    CREATININE 3.5 2020        Lab Results   Component Value Date    HGB 11.7 2020    HCT 38.7 2020       No results found for: PSA    Constitutional: Sore  Eyes: negative  Ears, nose, mouth, throat, and face: negative  Respiratory: negative  Cardiovascular: CAD  Gastrointestinal: GERD  Genitourinary: BPH  Musculoskeletal: negative  Neurological: negative  Behavioral/Psych: negative  Endocrine: negative    Skin dry, without rashes  Respirations non-labored, intact  Abdomen soft, non-tender, non-distended. Active bowel sounds. Obese  Alert and oriented x3  De Paz draining clear, juventino colored urine      Assessment and Plan:  Elevated PSA/Traumatic gross hematuria/Bilateral renal cysts  -Continue to hold anticoagulation at least for 1 more day if possible. If his urine remains clear by tomorrow, consider resuming Eliquis gradually and monitor for recurrent hematuria which he remains at risk for  -Creatinine is 3.5. Nephrology is following  -Irrigate the De Paz manually as needed.   He will be given a voiding trial once he is ambulatory, medically stable, and ready for discharge  -Repeat the PSA and rectal examination again in several months.  -We will follow him during his hospital stay        Jose Francisco Ovalles MD  1/19/2020  10:49 AM

## 2020-01-19 NOTE — PROGRESS NOTES
Age: 69 years Gender: Male Order Date:  1/15/2020 4:00 AM EXAM: XR CHEST PORTABLE one image INDICATION:  cp cp COMPARISON: 1/5/2020 FINDINGS: There is cardiomegaly. There is vascular congestion with the perihilar and bibasilar infiltrates and small pleural effusions. There is COPD. Mild CHF without edema. Us Retroperitoneal Complete    Result Date: 1/15/2020  Comparison: CT abdomen and pelvis January 5, 2020. Real-time and Doppler sonography of the kidneys and urinary bladder (retroperitoneum). Right kidney measures 15.1 x 6.8 x 7.9 cm. Left kidney measures 12.2 x 4.7 x 4.9 cm. Urinary bladder is not demonstrated. Anechoic lesions with posterior acoustic enhancement are present within the bilateral kidneys, largest on the right measuring up to 5.9 cm. The kidneys are negative for evidence of solid mass or hydronephrosis. There is no evidence for renal calculus disease. Bilateral renal cysts. Nonvisualization urinary bladder. Objective:   Vitals: /77   Pulse 110   Temp 98.4 °F (36.9 °C) (Oral)   Resp 20   Ht 5' 10\" (1.778 m)   Wt 268 lb 8.3 oz (121.8 kg)   SpO2 94%   BMI 38.53 kg/m²   General appearance: appears stated age   Skin:  No rashes or lesions  HEENT: Head: Normocephalic, no lesions, without obvious abnormality.   Neck: no adenopathy, no carotid bruit, no JVD, supple, symmetrical, trachea midline and thyroid not enlarged, symmetric, no tenderness/mass/nodules  Lungs: clear to auscultation bilaterally  Heart: regular rate and rhythm, S1, S2 normal, no murmur, click, rub or gallop  Abdomen: soft, non-tender; bowel sounds normal; no masses,  no organomegaly  Extremities: edema ++  Neurologic: Mental status: Alert, oriented, thought content appropriate    Assessment:   Patient Active Problem List:     Hyperlipidemia     CAD (coronary artery disease)     S/P CABG (coronary artery bypass graft)     Obesities, morbid (HCC)     CARLTON (obstructive sleep apnea)     Abdominal aortic aneurysm

## 2020-01-20 PROBLEM — E79.0 HYPERURICEMIA: Chronic | Status: ACTIVE | Noted: 2020-01-01

## 2020-01-20 NOTE — PROGRESS NOTES
Subjective:    He says he is feeling quite a bit better. He still feels short of breath but says it has improved after initiation of dialysis, without any dizziness or lightheadedness. He refuses short-term rehab at a nursing home, he wants to go home. Objective:    /82   Pulse 81   Temp 98.6 °F (37 °C) (Oral)   Resp 20   Ht 5' 10\" (1.778 m)   Wt 267 lb 13.7 oz (121.5 kg)   SpO2 95%   BMI 38.43 kg/m²     Current medications that patient is taking have been reviewed.     Weight is down to 267 lbs (282 on admission)    General appearance: NAD, conversant  HEENT: AT/NC, MMM  Neck: FROM, supple  Lungs: Mild bibasilar rales; normal WOB  CV: RRR, no MRGs  Abdomen: Soft, non-tender; no masses or HSM, +BS  Extremities: No peripheral edema or digital cyanosis  Skin: no rash, lesions or ulcers  Psych: Calm and cooperative  Neuro: Alert and interactive, nonfocal    Labs:  CBC:   Lab Results   Component Value Date    WBC 9.6 01/18/2020    RBC 4.04 01/18/2020    HGB 11.7 01/18/2020    HCT 38.7 01/18/2020    MCV 95.8 01/18/2020    MCH 29.0 01/18/2020    MCHC 30.2 01/18/2020    RDW 16.0 01/18/2020     01/18/2020    MPV 10.7 01/18/2020     BMP:    Lab Results   Component Value Date     01/20/2020    K 4.2 01/20/2020    K 5.4 01/16/2020    CL 92 01/20/2020    CO2 32 01/20/2020    BUN 64 01/20/2020    LABALBU 3.9 01/15/2020    CREATININE 3.2 01/20/2020    CALCIUM 9.2 01/20/2020    GFRAA 23 01/20/2020    LABGLOM 19 01/20/2020    GLUCOSE 116 01/20/2020        I have personally reviewed the patient's telemetry which is borderline controlled atrial fibrillation, rates in the 80s to 120s, although generally under 110    Assessment/Plan:    Principal Problem:    Acute on chronic systolic CHF (congestive heart failure) (HCC)  Active Problems:    Hyperlipidemia    HTN (hypertension), benign    CKD (chronic kidney disease) stage 4, GFR 15-29 ml/min (HCC)    Atrial fibrillation with RVR (HCC)    Hyperkalemia Lactic acidosis    Acute kidney injury superimposed on CKD (HCC)    Acute hypoxemic respiratory failure (HCC)    Prolonged Q-T interval on ECG    Morbid obesity with BMI of 40.0-44.9, adult (HCC)    Hyperuricemia  Resolved Problems:    * No resolved hospital problems. *    He is feeling better and looking better after initiation of dialysis  Per records, looks like 2 L ultrafiltration on Friday, 2.4 L on Saturday. His weight is down a lot. So far today he has made 800 cc of urine since 0700. He is doing surprisingly well and maybe just needed to be \"moved down the Starling curve\". We can make decision for additional HD on a day-by-day basis. Continue Bumex 1 mg IV twice daily  Atrial fibrillation is marginally controlled but not horrible. Okay to continue 50 mg twice daily of metoprolol. Continue apixaban.   Please avoid QTC prolonging medications    Requires continued inpatient level of care   Fredi aPblo    3:10 PM  1/20/2020  Cell: [phone]

## 2020-01-20 NOTE — PROGRESS NOTES
[id number] : 1945 Age: 69 years Gender: Male Order Date:  1/15/2020 4:00 AM EXAM: XR CHEST PORTABLE one image INDICATION:  cp cp COMPARISON: 2020 FINDINGS: There is cardiomegaly. There is vascular congestion with the perihilar and bibasilar infiltrates and small pleural effusions. There is COPD. Mild CHF without edema. Us Retroperitoneal Complete    Result Date: 1/15/2020  Comparison: CT abdomen and pelvis 2020. Real-time and Doppler sonography of the kidneys and urinary bladder (retroperitoneum). Right kidney measures 15.1 x 6.8 x 7.9 cm. Left kidney measures 12.2 x 4.7 x 4.9 cm. Urinary bladder is not demonstrated. Anechoic lesions with posterior acoustic enhancement are present within the bilateral kidneys, largest on the right measuring up to 5.9 cm. The kidneys are negative for evidence of solid mass or hydronephrosis. There is no evidence for renal calculus disease. Bilateral renal cysts. Nonvisualization urinary bladder. Objective:   Vitals: /85   Pulse 85   Temp 98.7 °F (37.1 °C) (Oral)   Resp 20   Ht 5' 10\" (1.778 m)   Wt 267 lb 13.7 oz (121.5 kg)   SpO2 96%   BMI 38.43 kg/m²   General appearance: appears stated age   Skin:  No rashes or lesions  HEENT: Head: Normocephalic, no lesions, without obvious abnormality. Neck: no adenopathy, no carotid bruit, no JVD, supple, symmetrical, trachea midline and thyroid not enlarged, symmetric, no tenderness/mass/nodules  Lungs: clear to auscultation bilaterally  Heart: regular rate and rhythm, S1, S2 normal, no murmur, click, rub or gallop  Abdomen: soft, non-tender; bowel sounds normal; no masses,  no organomegaly  Extremities: edema ++  Neurologic: Mental status: Alert, oriented, thought content appropriate      Assessment/Plan:     1) Acute kidney on CKD Stage 3 (remote Scr  2016 was 2.4 - 2. 9):   - now elevated in the setting of decreased renal perfusion w/ A-fib and CHF   - renal imagin19 (Kidneys

## 2020-01-20 NOTE — PROGRESS NOTES
[facility] PROGRESS NOTE                                                                       2020        CHIEF UROLOGIC COMPLAINT: Elevated PSA, traumatic gross hematuria  HISTORY OF PRESENT ILLNESS:  Patient without new complaints. He is having bladder spasm. No gross hematuria. He is concerned about his renal function long-term and is afraid of long-term dialysis. He is going for dialysis this morning and anxious about this.     REVIEW OF SYSTEMS:   CONSTITUTIONAL: Weakness  HEENT: negative  HEMATOLOGIC: negative  ENDOCRINE: negative  RESPIRATORY: negative  CV: negative  GI: negative  NEURO: negative  ORTHOPEDICS: negative  PSYCHIATRIC: negative  : as above    PAST FAMILY HISTORY:    Family History   Problem Relation Age of Onset    Other Mother     Cancer Father     High Cholesterol Brother     Coronary Art Dis Brother     Heart Disease Maternal Uncle     Heart Disease Maternal Grandfather      PAST SOCIAL HISTORY:    Social History     Socioeconomic History    Marital status:      Spouse name: Not on file    Number of children: Not on file    Years of education: Not on file    Highest education level: Not on file   Occupational History    Not on file   Social Needs    Financial resource strain: Not on file    Food insecurity:     Worry: Not on file     Inability: Not on file    Transportation needs:     Medical: Not on file     Non-medical: Not on file   Tobacco Use    Smoking status: Former Smoker     Last attempt to quit: 2005     Years since quittin.7    Smokeless tobacco: Never Used   Substance and Sexual Activity    Alcohol use: No    Drug use: No    Sexual activity: Not on file   Lifestyle    Physical activity:     Days per week: Not on file     Minutes per session: Not on file    Stress: Not on file   Relationships    Social connections: Talks on phone: Not on file     Gets together: Not on file     Attends Worship service: Not on file     Active member of club or organization: Not on file     Attends meetings of clubs or organizations: Not on file     Relationship status: Not on file    Intimate partner violence:     Fear of current or ex partner: Not on file     Emotionally abused: Not on file     Physically abused: Not on file     Forced sexual activity: Not on file   Other Topics Concern    Not on file   Social History Narrative    Not on file       Scheduled Meds:   bumetanide  1 mg Intravenous BID    melatonin  5 mg Oral Nightly    apixaban  5 mg Oral BID    buPROPion  150 mg Oral QAM    isosorbide mononitrate  60 mg Oral Daily    levothyroxine  25 mcg Oral Daily    metoprolol tartrate  50 mg Oral BID    sodium chloride flush  10 mL Intravenous 2 times per day    aspirin  81 mg Oral Daily     Continuous Infusions:   dextrose       PRN Meds:.opium-belladonna, glucose, dextrose, glucagon (rDNA), dextrose, sodium chloride flush, magnesium hydroxide, acetaminophen, zolpidem, calcium carbonate    /84   Pulse 105   Temp 98.3 °F (36.8 °C) (Oral)   Resp 20   Ht 5' 10\" (1.778 m)   Wt 267 lb 13.7 oz (121.5 kg)   SpO2 94%   BMI 38.43 kg/m²     Lab Results   Component Value Date    WBC 9.6 01/18/2020    HGB 11.7 (L) 01/18/2020    HCT 38.7 01/18/2020    MCV 95.8 01/18/2020     01/18/2020       Lab Results   Component Value Date    CREATININE 3.6 (H) 01/19/2020         PHYSICAL EXAMINATION:  Skin dry, without rashes  Respirations non-labored, intact  Abdomen soft, non-tender, non-distended  Alert and oriented x3  De Paz draining clear, yellow urine      ASSESSMENT AND PLAN:  1. Elevated PSA/Traumatic gross hematuria/Bilateral renal cysts  -Okay to resume blood thinners   -Creatinine is 3.5. Nephrology is following  -Irrigate the De Paz manually as needed.   He will be given a voiding trial once he is ambulatory,

## 2020-01-21 NOTE — PROGRESS NOTES
[facility]                                                            PROGRESS NOTE                                                                       1/21/2020          SUBJECTIVE:    Pt post op bk amputation  Urine still pink  Multiple clots irrigated  creatinine2.8    OBJECTIVE:    BP (!) 138/94   Pulse 98   Temp 97.8 °F (36.6 °C) (Oral)   Resp 16   Ht 5' 10\" (1.778 m)   Wt 264 lb 5.3 oz (119.9 kg)   SpO2 96%   BMI 37.93 kg/m²     PHYSICAL EXAMINATION:  Skin: dry, without rashes  Respirations: non-labored, intact  Abdomen: soft, non-tender, non-distended      Lab Results   Component Value Date    WBC 9.6 01/18/2020    HGB 11.7 (L) 01/18/2020    HCT 38.7 01/18/2020    MCV 95.8 01/18/2020     01/18/2020       Lab Results   Component Value Date    CREATININE 2.8 (H) 01/21/2020       No results found for: PSA    REVIEW OF SYSTEMS:    CONSTITUTIONAL: negative  HEENT: negative  HEMATOLOGIC: negative  ENDOCRINE: negative  RESPIRATORY: negative  CV: negative  GI: negative  NEURO: negative  ORTHOPEDICS: negative  PSYCHIATRIC: negative  : as above    PAST FAMILY HISTORY:    Family History   Problem Relation Age of Onset    Other Mother     Cancer Father     High Cholesterol Brother     Coronary Art Dis Brother     Heart Disease Maternal Uncle     Heart Disease Maternal Grandfather      PAST SOCIAL HISTORY:    Social History     Socioeconomic History    Marital status:      Spouse name: Not on file    Number of children: Not on file    Years of education: Not on file    Highest education level: Not on file   Occupational History    Not on file   Social Needs    Financial resource strain: Not on file    Food insecurity:     Worry: Not on file     Inability: Not on file    Transportation needs:     Medical: Not on file     Non-medical: Not on file   Tobacco Use    Smoking status: Former Smoker     Last attempt to quit: 5/1/2005 (chronic kidney disease) stage 4, GFR 15-29 ml/min (HCC)    Unable to ambulate    Left inguinal pain    Non-recurrent unilateral inguinal hernia without obstruction or gangrene    Atrial fibrillation with RVR (HCC)    Hyperkalemia    Lactic acidosis    Acute kidney injury superimposed on CKD ([facility] [address]    Acute hypoxemic respiratory failure (HCC)    Prolonged Q-T interval on ECG    Morbid obesity with BMI of 40.0-44.9, adult (HCC)    Hyperuricemia       PLAN:  Pt now appears to be free of clots  Will stop irrigation when stays clear  Will learve roche until activity increases      Amanda Campbell M.D.  1/21/2020  10:51 AM

## 2020-01-21 NOTE — PROGRESS NOTES
Pt discharged per order with documented belongings. Telemetry removed. IV site/s removed. Discharge instructions and education provided. Home medication schedule and follow-up appointment/s reviewed. Home roche catheter instructions given. All questions asked. Pt and family verbalized understanding.

## 2020-01-21 NOTE — PROGRESS NOTES
diverticula associated with sigmoid colon. No evidence of acute diverticulitis. There is a fat-containing left inguinal hernia measuring 4.9 cm and has increased in size since previous examination. The appendix is visualized and normal in right lower quadrant. Atherosclerotic calcifications are associated with the abdominal aorta. There is abdominal aortic aneurysm measuring up to 3.8 cm which appears similar in size compared to previous examination. View of lung bases shows minimal left pleural effusion. No evidence of pneumonia. There are severe degenerative changes associated with the lumbar spine. 1. Diverticulosis without evidence of acute diverticulitis. 2. Multiple bilateral renal cysts appearing similar since prior. Ultrasound follow-up could be helpful for further characterization. 3. Bilateral perinephric fat stranding could suggest chronic medical renal disease with appropriate clinical history. 4. Infrarenal abdominal aortic aneurysm measures 3.8 cm. Continued follow-up recommended as clinically indicated. 5. Fat-containing left inguinal hernia appears to be increase in size compared to the previous examination. Xr Chest Standard (2 Vw)    Result Date: 2019  Patient MRN: [id number] : 1945 Age:  69 years Gender: Male Order Date: 2019 11:00 AM Exam: XR CHEST (2 VW) Number of Images: 2 views Indication:   shortness of breath shortness of breath Comparison: 2019 FINDINGS: Heart is stably enlarged. Pulmonary vascularity is normal. Lungs are clear. Neither costophrenic angle appears blunted. There are postoperative changes as before. The distal thoracic aorta is tortuous. Cardiomegaly. No new abnormal findings. Xr Chest Portable    Result Date: 1/15/2020  Patient MRN:  [id number] : 1945 Age: 69 years Gender: Male Order Date:  1/15/2020 4:00 AM EXAM: XR CHEST PORTABLE one image INDICATION:  cp cp COMPARISON: 2020 FINDINGS: There is cardiomegaly.  There is

## 2020-01-21 NOTE — DISCHARGE SUMMARY
Physician Discharge Summary     Patient ID:  Cammie Austin  [id number]  69 y.o.  1945    Admit date: 1/15/2020    Discharge date and time:  01/21/20 4 PM    Admission Diagnoses:   CHF  SHARON on CKD    Discharge Diagnoses:   Principal Problem:    Acute on chronic systolic CHF (congestive heart failure) (HCC)  Active Problems:    Hyperlipidemia    HTN (hypertension), benign    CKD (chronic kidney disease) stage 4, GFR 15-29 ml/min (HCC)    Atrial fibrillation with RVR (HCC)    Hyperkalemia    Lactic acidosis    Acute kidney injury superimposed on CKD ([address]    Acute hypoxemic respiratory failure (HCC)    Prolonged Q-T interval on ECG    Morbid obesity with BMI of 40.0-44.9, adult ([facility] [address]    Hyperuricemia  Resolved Problems:    * No resolved hospital problems. *    Consults: cardiology, nephrology and urology    Procedures: temp HD catheter placement    Hospital Course:   Patient presented with dyspnea and was found to be in acute on chronic systolic congestive heart failure and atrial fibrillation with rapid ventricular response. Prior to this admission he was taking a lot of NSAIDs for musculoskeletal pain. His AF rates were marginal, going as high as the 120s, but not too horrible. He was initially on a diltiazem drip in the emergency department, but subsequently his rates have been pretty well controlled on his home dose of metoprolol. He was also found to have acute on chronic kidney injury and hyperkalemia. He was started on IV Lasix but he made little to no urine and his kidney function just got worse. High dose intravenous Bumex drip was attempted, and again he made little to no urine and his creatinine just got worse. He additionally had elevated lactate on presentation, concerning for malperfusion versus type B lactic acidosis. Due to poor response to diuretics, we were concerned for development of cardiorenal syndrome.   He had a repeat echocardiogram which shows ejection fraction about 20%, similar or slightly worse than his prior echo about 4 years ago. Because the patient's urine output was very poor, a De Paz catheter was placed for strict outputs. Unfortunately the patient pulled this out overnight, I believe the balloon was still inflated, and it caused a ureteral injury. The hematuria has resolved with CBI. Urology was consulted and they recommend leaving the De Paz in and they will follow-up with the patient in clinic. After several discussions with patient, family, and nephrology, the patient opted to try hemodialysis. A temporary catheter was placed and he had 2 dialysis treatments with removal of over 4 L of fluid. After this, he started to make plenty of urine and his creatinine started to come down spontaneously. No further dialysis sessions are required at this moment. He is diuresing vigorously with Bumex 1 mg orally twice daily. It is possible that we have moved him down the Starling curve and that he will maybe stay compensated for a little while longer although his overall prognosis is still poor. Note: admit weight 275 lbs, discharge weight 264 lbs    He is still diuresing vigorously on the Bumex twice daily and I warned him that he could develop dehydration so he needs to follow-up very closely with primary care, ideally before the end of the week. Of minor note, the patient developed acute monoarthritis of the right third toe which she says has been classic for gout for him. He does have hyperuricemia with a uric acid of 10. I gave him colchicine 1.2 mg upfront, followed by 0.6 mg.  He should have his uric acid rechecked as an outpatient and can have allopurinol initiated if uric acid is persistently high.     I have counseled him to not take NSAIDs    Discharge Exam:  Vitals:    01/20/20 1200 01/20/20 1530 01/21/20 0045 01/21/20 0800   BP: 116/82 136/86 111/84 (!) 138/94   Pulse: 81 71 101 98   Resp: 15 16 16 16   Temp: 98.6 °F (37 °C) 97.8 °F (36.6 °C) 98.3 °F (36.8 °C) 97.8 °F (36.6 °C)   TempSrc: Oral Oral Oral Oral   SpO2: 95% 96% 93% 96%   Weight:    264 lb 5.3 oz (119.9 kg)   Height:          He is a very pleasant male in no acute distress. Cardiac exam irregularly irregular no murmurs. Lungs clear bilaterally and work of breathing is normal.  De Paz is in place draining yellow / slightly orange tinged urine. Condition:  Stable (but very chronically ill)    Disposition: home    Patient Instructions:   Current Discharge Medication List      START taking these medications    Details   aspirin 81 MG chewable tablet Take 1 tablet by mouth daily  Qty: 30 tablet, Refills: 3      bumetanide (BUMEX) 1 MG tablet Take 1 tablet by mouth 2 times daily  Qty: 30 tablet, Refills: 3      opium-belladonna (B&O SUPPRETTES) 16.2-60 MG suppository Place 1 suppository rectally every 8 hours as needed for Pain (bladder pain) for up to 3 days. Qty: 5 suppository, Refills: 0    Comments: Reduce doses taken as pain becomes manageable  Associated Diagnoses: Gross hematuria      colchicine (COLCRYS) 0.6 MG tablet For new flare, can take 1.2 mg once, followed by 0.6 mg a few hours later. If not having flare, you do not need to take this. Qty: 10 tablet, Refills: 0    Comments: Change sig from prior script. CONTINUE these medications which have NOT CHANGED    Details   hydrALAZINE (APRESOLINE) 10 MG tablet Take 10 mg by mouth 3 times daily      triamcinolone (KENALOG) 0.1 % ointment Apply topically 2 times daily Apply topically 2 times daily.       melatonin 5 MG TABS tablet Take 5 mg by mouth daily      polyethylene glycol ([employer] PURELAX) powder Take 17 g by mouth daily as needed       acetaminophen (TYLENOL) 500 MG tablet Take 2 tablets by mouth every 6 hours as needed for Pain  Qty: 120 tablet, Refills: 3      docusate sodium (COLACE) 100 MG capsule Take 1 capsule by mouth 2 times daily  Qty: 60 capsule, Refills: 0      magnesium hydroxide (MILK OF MAGNESIA) 400 MG/5ML suspension Take 30 mLs by mouth daily as needed for Constipation      metoprolol tartrate (LOPRESSOR) 25 MG tablet Take 2 tablets by mouth 2 times daily  Qty: 60 tablet, Refills: 3      isosorbide mononitrate (IMDUR) 60 MG extended release tablet Take 1 tablet by mouth daily  Qty: 30 tablet, Refills: 3      apixaban (ELIQUIS) 5 MG TABS tablet Take 1 tablet by mouth 2 times daily  Qty: 60 tablet, Refills: 0      buPROPion (WELLBUTRIN XL) 150 MG extended release tablet Take 150 mg by mouth every morning      levothyroxine (SYNTHROID) 25 MCG tablet Take 1 tablet by mouth Daily  Qty: 30 tablet, Refills: 0      tiZANidine (ZANAFLEX) 4 MG tablet   Take 4 mg by mouth every 8 hours as needed            Activity: activity as tolerated  Diet: low salt    Follow-up with PCP in 2 days.     Note that over 30 minutes was spent in preparing discharge papers, discussing discharge with patient, medication review, etc.    Signed:  Randolph Austin    1/21/2020  4:18 PM

## 2020-01-21 NOTE — PROGRESS NOTES
[facility]                                                            PROGRESS NOTE                                                                       1/21/2020          SUBJECTIVE:   Urine clearing  Creatinine 3.5 today  Pt wants to be discharged to go to [place] for eval of AF  Renal ultrasound did not show any hydro     OBJECTIVE:    BP (!) 138/94   Pulse 98   Temp 97.8 °F (36.6 °C) (Oral)   Resp 16   Ht 5' 10\" (1.778 m)   Wt 264 lb 5.3 oz (119.9 kg)   SpO2 96%   BMI 37.93 kg/m²     PHYSICAL EXAMINATION:  Skin: dry, without rashes  Respirations: non-labored, intact  Abdomen: soft, non-tender, non-distended  Urine clearing      Lab Results   Component Value Date    WBC 9.6 01/18/2020    HGB 11.7 (L) 01/18/2020    HCT 38.7 01/18/2020    MCV 95.8 01/18/2020     01/18/2020       Lab Results   Component Value Date    CREATININE 2.8 (H) 01/21/2020       No results found for: PSA    REVIEW OF SYSTEMS:    CONSTITUTIONAL: negative  HEENT: negative  HEMATOLOGIC: negative  ENDOCRINE: negative  RESPIRATORY: negative  CV: negative  GI: negative  NEURO: negative  ORTHOPEDICS: negative  PSYCHIATRIC: negative  : as above    PAST FAMILY HISTORY:    Family History   Problem Relation Age of Onset    Other Mother     Cancer Father     High Cholesterol Brother     Coronary Art Dis Brother     Heart Disease Maternal Uncle     Heart Disease Maternal Grandfather      PAST SOCIAL HISTORY:    Social History     Socioeconomic History    Marital status:      Spouse name: Not on file    Number of children: Not on file    Years of education: Not on file    Highest education level: Not on file   Occupational History    Not on file   Social Needs    Financial resource strain: Not on file    Food insecurity:     Worry: Not on file     Inability: Not on file    Transportation needs:     Medical: Not on file     Non-medical: Not on file   Tobacco Use    Smoking status: Former Smoker     Last attempt to quit: 2005     Years since quittin.7    Smokeless tobacco: Never Used   Substance and Sexual Activity    Alcohol use: No    Drug use: No    Sexual activity: Not on file   Lifestyle    Physical activity:     Days per week: Not on file     Minutes per session: Not on file    Stress: Not on file   Relationships    Social connections:     Talks on phone: Not on file     Gets together: Not on file     Attends Confucianist service: Not on file     Active member of club or organization: Not on file     Attends meetings of clubs or organizations: Not on file     Relationship status: Not on file    Intimate partner violence:     Fear of current or ex partner: Not on file     Emotionally abused: Not on file     Physically abused: Not on file     Forced sexual activity: Not on file   Other Topics Concern    Not on file   Social History Narrative    Not on file       Scheduled Meds:   bumetanide  1 mg Intravenous BID    melatonin  5 mg Oral Nightly    apixaban  5 mg Oral BID    buPROPion  150 mg Oral QAM    isosorbide mononitrate  60 mg Oral Daily    levothyroxine  25 mcg Oral Daily    metoprolol tartrate  50 mg Oral BID    sodium chloride flush  10 mL Intravenous 2 times per day    aspirin  81 mg Oral Daily     Continuous Infusions:   dextrose       PRN Meds:.opium-belladonna, glucose, dextrose, glucagon (rDNA), dextrose, sodium chloride flush, magnesium hydroxide, acetaminophen, zolpidem, calcium carbonate    ASSESSMENT:    Patient Active Problem List   Diagnosis    Hyperlipidemia    CAD (coronary artery disease)    S/P CABG (coronary artery bypass graft)    Obesities, morbid ([facility] [address]    CARLTON (obstructive sleep apnea)    Abdominal aortic aneurysm without rupture ([facility] [address] [facility]    Acute on chronic systolic CHF (congestive heart failure) ([facility] [address]    Chronic kidney disease, stage III (moderate) ([facility] [address]    HTN (hypertension), benign    Acute decompensated heart failure (HCC)    Left ventricular systolic dysfunction    CKD (chronic kidney disease) stage 4, GFR 15-29 ml/min (HCC)    Unable to ambulate    Left inguinal pain    Non-recurrent unilateral inguinal hernia without obstruction or gangrene    Atrial fibrillation with RVR (HCC)    Hyperkalemia    Lactic acidosis    Acute kidney injury superimposed on CKD ([facility] [address]    Acute hypoxemic respiratory failure (HCC)    Prolonged Q-T interval on ECG    Morbid obesity with BMI of 40.0-44.9, adult (HCC)    Hyperuricemia       PLAN:  Will stop irrigation  Pt can be discharged with melchor Monroy M.D.  1/21/2020  11:56 AM

## 2020-01-21 NOTE — PROGRESS NOTES
Xr Chest Portable    Result Date: 1/15/2020  Patient MRN:  [id number] : 1945 Age: 69 years Gender: Male Order Date:  1/15/2020 4:00 AM EXAM: XR CHEST PORTABLE one image INDICATION:  cp cp COMPARISON: 2020 FINDINGS: There is cardiomegaly. There is vascular congestion with the perihilar and bibasilar infiltrates and small pleural effusions. There is COPD. Mild CHF without edema. Us Retroperitoneal Complete    Result Date: 1/15/2020  Comparison: CT abdomen and pelvis 2020. Real-time and Doppler sonography of the kidneys and urinary bladder (retroperitoneum). Right kidney measures 15.1 x 6.8 x 7.9 cm. Left kidney measures 12.2 x 4.7 x 4.9 cm. Urinary bladder is not demonstrated. Anechoic lesions with posterior acoustic enhancement are present within the bilateral kidneys, largest on the right measuring up to 5.9 cm. The kidneys are negative for evidence of solid mass or hydronephrosis. There is no evidence for renal calculus disease. Bilateral renal cysts. Nonvisualization urinary bladder. Objective:   Vitals: BP (!) 138/94   Pulse 98   Temp 97.8 °F (36.6 °C) (Oral)   Resp 16   Ht 5' 10\" (1.778 m)   Wt 264 lb 5.3 oz (119.9 kg)   SpO2 96%   BMI 37.93 kg/m²   General appearance: appears stated age   Skin:  No rashes or lesions  HEENT: Head: Normocephalic, no lesions, without obvious abnormality. Neck: no adenopathy, no carotid bruit, no JVD, supple, symmetrical, trachea midline and thyroid not enlarged, symmetric, no tenderness/mass/nodules  Lungs: clear to auscultation bilaterally  Heart: regular rate and rhythm, S1, S2 normal, no murmur, click, rub or gallop  Abdomen: soft, non-tender; bowel sounds normal; no masses,  no organomegaly  Extremities: edema ++  Neurologic: Mental status: Alert, oriented, thought content appropriate      Assessment/Plan:     1) Acute kidney on CKD Stage 3 (remote Scr  2016 was 2.4 - 2. 9):   - now elevated in the setting of decreased renal

## 2020-01-22 NOTE — FLOWSHEET NOTE
ET Nurse(initial evaluation) outpatient  Admit Date: 1/22/2020 11:47 AM    Reason for consult:  Patient called this morning upset about his bag leaking  Unable to make sense what he was talking about I asked him if he was able to come in to see me    Significant history:  Upon talking with patient he states he had a penile implant a few days ago and went home with a roche catheter    Stoma assessment:     01/22/20 1230   Urethral Catheter Triple-lumen 22 fr   Placement Date/Time: 01/18/20 0400   Urethral Catheter Timeout: Sterile technique;Patient; Correct Position;Procedure  Inserted by: FARAZ Redding RN  Catheter Type: Triple-lumen  Tube Size (fr): 22 fr  Catheter Balloon Size: 30 mL  Collection Container: S...    Catheter Indications   (penile implant)     Plan:    Upon reviewing the chart no note that a penile implant occurred  Patient had CBI while in hospital and sent home with a leg bag  Identified that patient may have not closed the end of his leg bag causing the problem he had this morning with urine all over his bed  Patient not sent home with as night time drainage bag  Reviewed closer of leg bag and how to switch from leg bag to night time drainage bag  Patient verbalized understanding of information provided    Maame Mcdaniel 1/22/2020 2:23 PM

## 2020-01-24 NOTE — ED NOTES
Pt states that he had this catheter put in last Friday. Pt states that he wants the cathter out and he wants to go home. Pt states that the cath is supposed to come out on Monday in [place] office. Pt states that he is getting bladder spasms multiple times and hour. Pt states that he is peeing a lot and he thinks that he will be able to pee at home. This RN states that we will talk to the doctor before removing the catheter. Pt is resting on the side of the bed. Pt is in NARD With ABC's in tact. Will continue to monitor.      Fely Lay, RN  01/24/20 4022

## 2020-01-24 NOTE — ED PROVIDER NOTES
HPI:  1/24/20,   Time: 5:11 AM         Savita Mcnair is a 69 y.o. male presenting to the ED for request to remove De Paz catheter that was placed 4 days ago for the purpose of monitoring urine output while he was on Lasix in the hospital but now states he is getting spasms of the bladder and wants the catheter out, beginning spasms started over the last few days ago. The complaint has been intermittent, moderate in severity, and worsened by nothing. No fever chills or night sweats no chest pain or shortness of breath or cough and no abdominal pain or vomiting or diarrhea    ROS:   Pertinent positives and negatives are stated within HPI, all other systems reviewed and are negative.  --------------------------------------------- PAST HISTORY ---------------------------------------------  Past Medical History:  has a past medical history of CAD (coronary artery disease), GERD (gastroesophageal reflux disease), HFrEF (heart failure with reduced ejection fraction) ([facility] [address] History of blood transfusion, Hyperlipidemia, Hypertension, Systolic and diastolic CHF, chronic ([facility] [address] and Toe cyanosis. Past Surgical History:  has a past surgical history that includes Cardiac surgery; ECHO Compl W Dop Color Flow (5/26/2015); and Coronary artery bypass graft (10/4/2005). Social History:  reports that he quit smoking about 14 years ago. He has never used smokeless tobacco. He reports that he does not drink alcohol or use drugs. Family History: family history includes Cancer in his father; Coronary Art Dis in his brother; Heart Disease in his maternal grandfather and maternal uncle; High Cholesterol in his brother; Other in his mother. The patients home medications have been reviewed.     Allergies: Crestor [rosuvastatin calcium]    -------------------------------------------------- RESULTS -------------------------------------------------  All laboratory and radiology results have been personally reviewed by myself LABS:  No results found for this visit on 01/24/20. RADIOLOGY:  Interpreted by Radiologist.  No orders to display       ------------------------- NURSING NOTES AND VITALS REVIEWED ---------------------------   The nursing notes within the ED encounter and vital signs as below have been reviewed. BP (!) 149/93   Pulse 79   Temp 97.8 °F (36.6 °C) (Oral)   Resp 16   Ht 5' 10\" (1.778 m)   Wt 266 lb (120.7 kg)   SpO2 98%   BMI 38.17 kg/m²   Oxygen Saturation Interpretation: Normal      ---------------------------------------------------PHYSICAL EXAM--------------------------------------      Constitutional/General: Alert and oriented x3, well appearing, non toxic in NAD  Head: NC/AT  Eyes: PERRL, EOMI  Mouth: Oropharynx clear, handling secretions, no trismus  Neck: Supple, full ROM, no meningeal signs  Pulmonary: Lungs clear to auscultation bilaterally, no wheezes, rales, or rhonchi. Not in respiratory distress  Cardiovascular:  Regular rate and rhythm, no murmurs, gallops, or rubs. 2+ distal pulses  Abdomen: Soft, non tender, non distended,   Extremities: Moves all extremities x 4. Warm and well perfused  Skin: warm and dry without rash  Neurologic: GCS 15,  Psych: Normal Affect      ------------------------------ ED COURSE/MEDICAL DECISION MAKING----------------------  Medications - No data to display      Medical Decision Making: Will remove De Paz catheter as patient requests    Counseling: The emergency provider has spoken with the patient and discussed todays results, in addition to providing specific details for the plan of care and counseling regarding the diagnosis and prognosis. Questions are answered at this time and they are agreeable with the plan.      --------------------------------- IMPRESSION AND DISPOSITION ---------------------------------    IMPRESSION  1.  Encounter for De Paz catheter removal Stable       DISPOSITION  Disposition: Discharge to home  Patient condition is stable                  Dulce Alfaro MD  01/24/20 4604

## 2020-01-27 NOTE — TELEPHONE ENCOUNTER
I called Ulysses Carbine to follow-up how his cardiology appointments went, but unfortunately he was in and out of the hospital with SHARON and HD. He is home now and doing much better. For him the heart ablation is priority number one. Will have him return to clinic in 3 months.

## 2020-01-27 NOTE — TELEPHONE ENCOUNTER
MA scheduled patient for 04/20/20 @ 2:30pm in [facility]. MA mailed appointment letter to address on file with appointment information.         Electronically signed by Ivan Ponce MA on 1/27/20 at 1:00 PM

## 2020-01-28 NOTE — PROGRESS NOTES
Patient arrived in CT without a urinary catheter. This scan can not be completed without the patient having a catheter. Dr. Kendell Barrientos notified. Patient returned to ER. Please notify CT when a catheter is in place.

## 2020-01-28 NOTE — TELEPHONE ENCOUNTER
Called to make ED follow up visit with PCP. PCP office will call patient with appointment date and time.

## 2020-01-28 NOTE — ED PROVIDER NOTES
Department of Emergency Medicine   ED  Provider Note  Admit Date/RoomTime: 1/28/2020  1:32 AM  ED Room: 10/10      History of Present Illness:  1/28/20, Time: 2:25 AM         Tamiko Shipman is a 69 y.o. male presenting to the ED for fall, beginning tonight. The complaint has been persistent, mild in severity, and worsened by nothing. Pt with history of HLD, HTN, and CAD presents after he tripped on a carpet and fell forward onto his knees and hit his genitals. He states as soon as he fell he noticed bleeding coming from his penis. He last urinated normally 3 hours ago. Pt denies LOC or hitting his head. He states he has had intermittent hematuria over the last 2 weeks with roche catheters placed but he needed to get it removed due to the pain and spasms. Pt denies CP, SOB, fever, HA, nausea, vomiting, diarrhea, lightheadedness, abdominal pain, or hematochezia. Review of Systems:   Pertinent positives and negatives are stated within HPI, all other systems reviewed and are negative.    --------------------------------------------- PAST HISTORY ---------------------------------------------  Past Medical History:  has a past medical history of CAD (coronary artery disease), GERD (gastroesophageal reflux disease), HFrEF (heart failure with reduced ejection fraction) ([facility] [address] History of blood transfusion, Hyperlipidemia, Hypertension, Systolic and diastolic CHF, chronic ([facility] [address] and Toe cyanosis. Past Surgical History:  has a past surgical history that includes Cardiac surgery; ECHO Compl W Dop Color Flow (5/26/2015); and Coronary artery bypass graft (10/4/2005). Social History:  reports that he quit smoking about 14 years ago. He has never used smokeless tobacco. He reports that he does not drink alcohol or use drugs.     Family History: family history includes Cancer in his father; Coronary Art Dis in his brother; Heart Disease in his maternal grandfather and maternal uncle; High Cholesterol in his brother; 42.0 %    Monocytes % 10.3 2.0 - 12.0 %    Eosinophils % 4.1 0.0 - 6.0 %    Basophils % 0.5 0.0 - 2.0 %    Neutrophils Absolute 7.20 1.80 - 7.30 E9/L    Immature Granulocytes # 0.04 E9/L    Lymphocytes Absolute 1.24 (L) 1.50 - 4.00 E9/L    Monocytes Absolute 1.03 (H) 0.10 - 0.95 E9/L    Eosinophils Absolute 0.41 0.05 - 0.50 E9/L    Basophils Absolute 0.05 0.00 - 0.20 Y2/U   Basic Metabolic Panel w/ Reflex to MG   Result Value Ref Range    Sodium 143 132 - 146 mmol/L    Potassium reflex Magnesium 4.3 3.5 - 5.0 mmol/L    Chloride 104 98 - 107 mmol/L    CO2 25 22 - 29 mmol/L    Anion Gap 14 7 - 16 mmol/L    Glucose 89 74 - 99 mg/dL    BUN 44 (H) 8 - 23 mg/dL    CREATININE 1.9 (H) 0.7 - 1.2 mg/dL    GFR Non-African American 35 >=60 mL/min/1.73    GFR African American 42     Calcium 8.8 8.6 - 10.2 mg/dL   Protime-INR   Result Value Ref Range    Protime 19.5 (H) 9.3 - 12.4 sec    INR 1.7    Urinalysis, reflex to microscopic   Result Value Ref Range    Color, UA Yellow Straw/Yellow    Clarity, UA Clear Clear    Glucose, Ur Negative Negative mg/dL    Bilirubin Urine Negative Negative    Ketones, Urine Negative Negative mg/dL    Specific Gravity, UA 1.015 1.005 - 1.030    Blood, Urine LARGE (A) Negative    pH, UA 6.0 5.0 - 9.0    Protein, UA 30 (A) Negative mg/dL    Urobilinogen, Urine 0.2 <2.0 E.U./dL    Nitrite, Urine Negative Negative    Leukocyte Esterase, Urine TRACE (A) Negative   Microscopic Urinalysis   Result Value Ref Range    WBC, UA 0-1 0 - 5 /HPF    RBC, UA >20 0 - 2 /HPF    Bacteria, UA RARE (A) /HPF       RADIOLOGY:  Interpreted by Radiologist.  No orders to display         ------------------------- NURSING NOTES AND VITALS REVIEWED ---------------------------   The nursing notes within the ED encounter and vital signs as below have been reviewed by myself.   /89   Pulse 103   Temp 97.4 °F (36.3 °C) (Oral)   Resp 18   Ht 5' 10\" (1.778 m)   Wt 266 lb (120.7 kg)   SpO2 98%   BMI 38.17 kg/m²   Oxygen

## 2020-02-01 NOTE — H&P
Linda Whitney M.D. History and Physical      CHIEF COMPLAINT: shortness of breath     Reason for Admission:  Atrial fib rvr     History Obtained From:  Pt/emr     HISTORY OF PRESENT ILLNESS:      The patient is a 69 y.o. male of Himanshu Broussard MD with significant past medical history of cad/ htn/hld. chf EF 40% , CKD- recent inpt stay with discharge on 1/19 for CHF/SHARNO -  who presents with complaints of shortness of breath that has been progressively worsening over the last few weeks he finally came in and was noted to be in afib RVR. CXr with fluid overload , feels better today . His biggest complaints is he cannot sleep. No further sob.    BP (!) 113/95   Pulse 82   Temp 97 °F (36.1 °C) (Oral)   Resp 20   Wt 257 lb 11.2 oz (116.9 kg)   SpO2 94%   BMI 36.98 kg/m²   Labs bun/creat 56/2.6, BNP 24k     Past Medical History:        Diagnosis Date    CAD (coronary artery disease)     GERD (gastroesophageal reflux disease)     HFrEF (heart failure with reduced ejection fraction) ([facility] [address] 1/25/16 1/22/16- echo- LVEF 20-25%, mild MR, pulmonary hypertension moderate-severe, aortic valve mildly sclerotic (5/26/15- echocardiogram- LVEF 40%, left atrium moderately dilated, mild tricuspid regurgitation)    History of blood transfusion     Hyperlipidemia     Hypertension     Systolic and diastolic CHF, chronic ([address]     8/9/15- cardiology consult combined systolic and diastolic heart failure    Toe cyanosis 12/25/2015     Past Surgical History:        Procedure Laterality Date    CARDIAC SURGERY      CORONARY ARTERY BYPASS GRAFT  10/4/2005    [id number]    Dr. Brady Mcfarland, [facility]    ECHO COMPL W DOP COLOR FLOW  5/26/2015              Medications Prior to Admission:    Medications Prior to Admission: brompheniramine-pseudoephedrine 1-15 MG/5ML ELIX elixir, Take 5 mLs by mouth every 6 hours as needed (cough)  cefdinir (OMNICEF) 300 MG capsule, Take 1 capsule by mouth 2 times daily for 10 days  aspirin 81 MG chewable tablet, Take 1 tablet by mouth daily  bumetanide (BUMEX) 1 MG tablet, Take 1 tablet by mouth 2 times daily  colchicine (COLCRYS) 0.6 MG tablet, For new flare, can take 1.2 mg once, followed by 0.6 mg a few hours later. If not having flare, you do not need to take this. hydrALAZINE (APRESOLINE) 10 MG tablet, Take 10 mg by mouth 3 times daily  triamcinolone (KENALOG) 0.1 % ointment, Apply topically 2 times daily Apply topically 2 times daily. melatonin 5 MG TABS tablet, Take 5 mg by mouth daily  polyethylene glycol ([employer] PURELAX) powder, Take 17 g by mouth daily as needed   acetaminophen (TYLENOL) 500 MG tablet, Take 2 tablets by mouth every 6 hours as needed for Pain  docusate sodium (COLACE) 100 MG capsule, Take 1 capsule by mouth 2 times daily  magnesium hydroxide (MILK OF MAGNESIA) 400 MG/5ML suspension, Take 30 mLs by mouth daily as needed for Constipation  metoprolol tartrate (LOPRESSOR) 25 MG tablet, Take 2 tablets by mouth 2 times daily  isosorbide mononitrate (IMDUR) 60 MG extended release tablet, Take 1 tablet by mouth daily  apixaban (ELIQUIS) 5 MG TABS tablet, Take 1 tablet by mouth 2 times daily  buPROPion (WELLBUTRIN XL) 150 MG extended release tablet, Take 150 mg by mouth every morning  levothyroxine (SYNTHROID) 25 MCG tablet, Take 1 tablet by mouth Daily  tiZANidine (ZANAFLEX) 4 MG tablet,  Take 4 mg by mouth every 8 hours as needed   opium-belladonna (B&O SUPPRETTES) 16.2-60 MG suppository, Place 1 suppository rectally every 8 hours as needed for Pain (bladder pain) for up to 3 days. Allergies:  Crestor [rosuvastatin calcium]    Social History:   TOBACCO:   reports that he quit smoking about 14 years ago. He has never used smokeless tobacco.  ETOH:   reports no history of alcohol use.   MARITAL STATUS:    OCCUPATION:      Family History:       Problem Relation Age of Onset    Other Mother     Cancer Father     High Cholesterol Brother     Coronary Art Dis Brother

## 2020-02-01 NOTE — PROGRESS NOTES
kidney disease) stage 4, GFR 15-29 ml/min (HCC)    Unable to ambulate    Left inguinal pain    Non-recurrent unilateral inguinal hernia without obstruction or gangrene    Atrial fibrillation with RVR ([facility])    Hyperkalemia    Lactic acidosis    Acute kidney injury superimposed on CKD (HCC)    Acute hypoxemic respiratory failure (HCC)    Prolonged Q-T interval on ECG    Morbid obesity with BMI of 40.0-44.9, adult ([facility])    Hyperuricemia       Meds:     apixaban  5 mg Oral BID    aspirin  81 mg Oral Daily    buPROPion  150 mg Oral QAM    cefdinir  300 mg Oral BID    hydrALAZINE  10 mg Oral TID    isosorbide mononitrate  60 mg Oral Daily    levothyroxine  25 mcg Oral Daily    melatonin  5 mg Oral Daily    metoprolol tartrate  50 mg Oral BID    sodium chloride flush  10 mL Intravenous 2 times per day    bumetanide  1 mg Intravenous BID    ipratropium-albuterol  1 ampule Inhalation Q4H WA    QUEtiapine  25 mg Oral Nightly           Meds prn:     sodium chloride flush, magnesium hydroxide, ondansetron, acetaminophen    Meds prior to admission:     No current facility-administered medications on file prior to encounter. Current Outpatient Medications on File Prior to Encounter   Medication Sig Dispense Refill    brompheniramine-pseudoephedrine 1-15 MG/5ML ELIX elixir Take 5 mLs by mouth every 6 hours as needed (cough)      cefdinir (OMNICEF) 300 MG capsule Take 1 capsule by mouth 2 times daily for 10 days 20 capsule 0    aspirin 81 MG chewable tablet Take 1 tablet by mouth daily 30 tablet 3    bumetanide (BUMEX) 1 MG tablet Take 1 tablet by mouth 2 times daily 30 tablet 3    colchicine (COLCRYS) 0.6 MG tablet For new flare, can take 1.2 mg once, followed by 0.6 mg a few hours later. If not having flare, you do not need to take this.  10 tablet 0    hydrALAZINE (APRESOLINE) 10 MG tablet Take 10 mg by mouth 3 times daily      triamcinolone (KENALOG) 0.1 % ointment Apply topically 2 times daily Apply topically 2 times daily.  melatonin 5 MG TABS tablet Take 5 mg by mouth daily      polyethylene glycol ([employer] PURELAX) powder Take 17 g by mouth daily as needed       acetaminophen (TYLENOL) 500 MG tablet Take 2 tablets by mouth every 6 hours as needed for Pain 120 tablet 3    docusate sodium (COLACE) 100 MG capsule Take 1 capsule by mouth 2 times daily 60 capsule 0    magnesium hydroxide (MILK OF MAGNESIA) 400 MG/5ML suspension Take 30 mLs by mouth daily as needed for Constipation      metoprolol tartrate (LOPRESSOR) 25 MG tablet Take 2 tablets by mouth 2 times daily 60 tablet 3    isosorbide mononitrate (IMDUR) 60 MG extended release tablet Take 1 tablet by mouth daily 30 tablet 3    apixaban (ELIQUIS) 5 MG TABS tablet Take 1 tablet by mouth 2 times daily 60 tablet 0    buPROPion (WELLBUTRIN XL) 150 MG extended release tablet Take 150 mg by mouth every morning      levothyroxine (SYNTHROID) 25 MCG tablet Take 1 tablet by mouth Daily 30 tablet 0    tiZANidine (ZANAFLEX) 4 MG tablet   Take 4 mg by mouth every 8 hours as needed       opium-belladonna (B&O SUPPRETTES) 16.2-60 MG suppository Place 1 suppository rectally every 8 hours as needed for Pain (bladder pain) for up to 3 days. 5 suppository 0       Allergies:    Crestor [rosuvastatin calcium]    Social History:     reports that he quit smoking about 14 years ago. He has never used smokeless tobacco. He reports that he does not drink alcohol or use drugs.     Family History:         Problem Relation Age of Onset    Other Mother     Cancer Father     High Cholesterol Brother     Coronary Art Dis Brother     Heart Disease Maternal Uncle     Heart Disease Maternal Grandfather         ROS:     General: no fever, chills   Heent: no nasal congestion, sore throat   Resp: no cough, sob   Cardiac: no cp   Gi: no nausea, vomiting, melena, abd pain, nausea  Gu: no hematuria, dysuria   Neruo: no numbness, weakness, headache, blurry vision Endocrine:  No h/o dm  Derm: no rash   Heme: no epistaxis, bruising  All other sx negative     Physical Exam:    Patient Vitals for the past 24 hrs:   BP Temp Temp src Pulse Resp SpO2 Weight   02/01/20 0800 (!) 113/95 97 °F (36.1 °C) Oral 82 20 -- 257 lb 11.2 oz (116.9 kg)   02/01/20 0600 107/82 -- -- 97 21 94 % --   02/01/20 0545 -- -- -- 94 27 94 % --   02/01/20 0530 (!) 132/98 -- -- 113 29 -- --   02/01/20 0515 -- -- -- 115 22 -- --   02/01/20 0500 -- -- -- 114 22 95 % --   02/01/20 0445 -- -- -- 114 22 95 % --   02/01/20 0430 121/84 -- -- 119 24 95 % --   02/01/20 0415 -- -- -- 120 14 95 % --   02/01/20 0400 128/88 -- -- 122 14 99 % --   02/01/20 0251 119/83 97.6 °F (36.4 °C) -- 114 18 96 % --   02/01/20 0236 -- -- -- 118 -- 92 % --         Intake/Output Summary (Last 24 hours) at 2/1/2020 1446  Last data filed at 2/1/2020 1441  Gross per 24 hour   Intake 720 ml   Output 650 ml   Net 70 ml       Constitutional: Patient in no acute distress   Head: normocephalic, atraumatic   Neck: supple, no jvd  Cardiovascular: regular rate and rhythm, no murmurs, gallops, or rubs   Respiratory: Clear, no rales, rhochi, or wheezes,   Gastrointestinal: soft, nontender, nondistended, no hepatosplenomegaly  Ext: edema 1-2+  Neuro: aaox3  Skin: dry, no rash   Back: nontender    Data:    Recent Labs     02/01/20  0330   WBC 10.5   HGB 12.2*   HCT 40.9   MCV 96.2          Recent Labs     02/01/20  0330      K 4.6   CL 95*   CO2 24   CREATININE 2.6*   BUN 56*   LABGLOM 24   GLUCOSE 118*   CALCIUM 9.2       Recent Labs     02/01/20  0330   ALT 23   AST 20   ALKPHOS 78   BILITOT 0.8       Lab Results   Component Value Date    FERRITIN 145 12/10/2015    IRON 34 (L) 12/10/2015    TIBC 338 12/10/2015       Lab Results   Component Value Date    MG 1.8 12/25/2019    PHOS 6.7 (H) 01/16/2020       Lab Results   Component Value Date    LABALBU 4.0 02/01/2020    LABALBU 3.9 01/15/2020    LABALBU 4.0 01/05/2020       Assessment and Plan:    1)CKD Stage 3  Baseline cr 2.6   Sp hd x 2 earlier in month last admission     2) Acute systolic CHF  w/ elevated BNP  fluid restriction   Follow on bumex iv bid  Low salt diet     3) A Fib:   Per dr Litzy Rose     4) HTN  follow on current meds   On apresoline, lopressor, imdur     5) Gout  1/19 c/o severe pain and redness in left great toe  Uric acid level 10  improved     6) Hematuria:  pt pulled roche out on 1/17  Improved after cbi last admission       Johnny Valles.  Jasmin Rivera MD

## 2020-02-01 NOTE — ED NOTES
Bed: 23  Expected date:   Expected time:   Means of arrival:   Comments:  EMS     Yuri Elias RN  02/01/20 [id number]

## 2020-02-01 NOTE — ED NOTES
KIERSTEN faxed to [address] [place] [address], [facility]  02/01/20 [id number]

## 2020-02-01 NOTE — ED PROVIDER NOTES
HPI:  2/1/20,   Time: 3:35 AM       John Santillan is a 69 y.o. male presenting to the E shortness of breath  for 2 weeks, beginning 2 weeks ago. The complaint has been persistent, moderate in severity, and worsened by changing position. Patient states that over the last 2 weeks he has had increasing shortness of breath. He states that he has been unable to lay flat without getting short of breath. He states that he has been unable to sleep because every time he falls asleep he wakes up in the middle of night gasping for breath. He states that he has a nonproductive cough. Otherwise patient denies any fevers chills. Denies any other systemic symptoms. No nausea vomiting or abdominal pain. Review of Systems:   Pertinent positives and negatives are stated within HPI, all other systems reviewed and are negative.          --------------------------------------------- PAST HISTORY ---------------------------------------------  Past Medical History:  has a past medical history of CAD (coronary artery disease), GERD (gastroesophageal reflux disease), HFrEF (heart failure with reduced ejection fraction) ([facility] [address] History of blood transfusion, Hyperlipidemia, Hypertension, Systolic and diastolic CHF, chronic ([facility] [address] and Toe cyanosis. Past Surgical History:  has a past surgical history that includes Cardiac surgery; ECHO Compl W Dop Color Flow (5/26/2015); and Coronary artery bypass graft (10/4/2005). Social History:  reports that he quit smoking about 14 years ago. He has never used smokeless tobacco. He reports that he does not drink alcohol or use drugs. Family History: family history includes Cancer in his father; Coronary Art Dis in his brother; Heart Disease in his maternal grandfather and maternal uncle; High Cholesterol in his brother; Other in his mother. The patients home medications have been reviewed.     Allergies: Crestor Jackie-Hermilo calcium]        ---------------------------------------------------PHYSICAL EXAM--------------------------------------    Constitutional/General: Alert and oriented x3, well appearing, non toxic in NAD  Head: Normocephalic and atraumatic  Eyes: PERRL, EOMI, conjunctive normal, sclera non icteric  Mouth: Oropharynx clear, handling secretions, no trismus, no asymmetry of the posterior oropharynx or uvular edema  Neck: Supple, full ROM, non tender to palpation in the midline, no stridor, no crepitus, no meningeal signs  Respiratory: Crackles to bilateral bases no wheezes, or rhonchi. Not in respiratory distress  Cardiovascular: Tachycardic, irregularly irregular rhythm no murmurs, gallops, or rubs. 2+ distal pulses  GI:  Abdomen Soft, Non tender, Non distended. +BS. No organomegaly, no palpable masses,  No rebound, guarding, or rigidity. Musculoskeletal: Moves all extremities x 4. Warm and well perfused, no clubbing, cyanosis. 2+ edema in bilateral lower extremities lymphatic: no lymphadenopathy noted  Neurologic: GCS 15, no focal deficits,   Psychiatric: Normal Affect    -------------------------------------------------- RESULTS -------------------------------------------------  I have personally reviewed all laboratory and imaging results for this patient. Results are listed below.      LABS:  Results for orders placed or performed during the hospital encounter of 02/01/20   CBC Auto Differential   Result Value Ref Range    WBC 10.5 4.5 - 11.5 E9/L    RBC 4.25 3.80 - 5.80 E12/L    Hemoglobin 12.2 (L) 12.5 - 16.5 g/dL    Hematocrit 40.9 37.0 - 54.0 %    MCV 96.2 80.0 - 99.9 fL    MCH 28.7 26.0 - 35.0 pg    MCHC 29.8 (L) 32.0 - 34.5 %    RDW 16.1 (H) 11.5 - 15.0 fL    Platelets 476 054 - 597 E9/L    MPV 11.3 7.0 - 12.0 fL    Neutrophils % 63.3 43.0 - 80.0 %    Immature Granulocytes % 0.6 0.0 - 5.0 %    Lymphocytes % 22.0 20.0 - 42.0 %    Monocytes % 8.6 2.0 - 12.0 %    Eosinophils % 4.6 0.0 - 6.0 %    Basophils % creatinine of 56 was 2.6 up from baseline of 1.9. Troponin was 0.02. BNP elevated at 23,841. EKG showed A. fib with RVR but no ischemic findings. The patient demonstrates an acute kidney injury as well as fluid overload on examination. The patient being diuresed with gentle hydration. The patient was started on IV Lasix. Patient was also given a dose of Cardizem for his A. fib with RVR with improvement of his rate. Case discussed with Sachi who is on-call for Dr. Uriah Álvarez who is agreed admit the patient for further care. This patient's ED course included: a personal history and physicial examination and re-evaluation prior to disposition    This patient has remained hemodynamically stable during their ED course. Re-Evaluations:             Re-evaluation. Patients symptoms are improving          Counseling: The emergency provider has spoken with the patient and discussed todays results, in addition to providing specific details for the plan of care and counseling regarding the diagnosis and prognosis. Questions are answered at this time and they are agreeable with the plan.       --------------------------------- IMPRESSION AND DISPOSITION ---------------------------------    IMPRESSION  1. Atrial fibrillation with RVR ([facility] [address]    2. Acute on chronic combined systolic and diastolic CHF (congestive heart failure) ([facility] [address]    3. [facility]        DISPOSITION  Disposition: Admit to telemetry  Patient condition is stable    NOTE: This report was transcribed using voice recognition software.  Every effort was made to ensure accuracy; however, inadvertent computerized transcription errors may be present        Laura Sen DO  02/01/20 [id number]

## 2020-02-02 NOTE — CONSULTS
CARDIOLOGY CONSULTATION    Patient Name:  Karlo Toledo    :  1945    Reason for Consultation:   Recurrent CHF and atrial fibrillation    History of Present Illness:   Karlo Toledo returns to [address] following history of increasing shortness of breath and swelling of his lower abdomen and lower extremities over the past several days since discharge from the hospital only a few weeks back. Likewise, the emergency room he remained in atrial fibrillation with a rapid ventricular response. Mr. Hali Yung, has a history of coronary artery bypass surgery as well as morbid obesity obstructive sleep apnea importantly recurrent persistent atrial fibrillation for which he underwent a previous pulmonary vein isolation ablation approximately 4 .5 years ago. He specifically denies any chest discomfort presently. He does however, experience shortness of breath with mild exertion. He is now readmitted for further diagnostic studies adjustment of his medical regimen. Past Medical History:   has a past medical history of CAD (coronary artery disease), GERD (gastroesophageal reflux disease), HFrEF (heart failure with reduced ejection fraction) ([address] History of blood transfusion, Hyperlipidemia, Hypertension, Systolic and diastolic CHF, chronic ([address] and Toe cyanosis. Surgical History:   has a past surgical history that includes Cardiac surgery; ECHO Compl W Dop Color Flow (2015); and Coronary artery bypass graft (10/4/2005). Social History:   reports that he quit smoking about 14 years ago. He has never used smokeless tobacco. He reports that he does not drink alcohol or use drugs. Family History:  family history includes Cancer in his father; Coronary Art Dis in his brother; Heart Disease in his maternal grandfather and maternal uncle; High Cholesterol in his brother; Other in his mother.      Medications:  Prior to Admission medications    Medication Sig Start Date End Date mouth every morning   Yes Historical Provider, MD   levothyroxine (SYNTHROID) 25 MCG tablet Take 1 tablet by mouth Daily 11/25/15  Yes Benny Moody DO   tiZANidine (ZANAFLEX) 4 MG tablet   Take 4 mg by mouth every 8 hours as needed    Yes Historical Provider, MD   opium-belladonna (B&O SUPPRETTES) 16.2-60 MG suppository Place 1 suppository rectally every 8 hours as needed for Pain (bladder pain) for up to 3 days. 1/21/20 1/24/20  Joseline Mtz MD       Allergies:  Crestor [rosuvastatin calcium]     Review of Systems:   · Constitutional: there has been no unanticipated weight loss. There's been no significant change in energy level, sleep pattern or activity level. No fever chills or rigors. · Eyes: No visual changes or diplopia. No scleral icterus. · ENT: No Headaches, hearing loss or vertigo. No mouth sores or sore throat. No change in taste or smell. · Cardiovascular: No chest discomfort, + dyspnea on exertion and now at rest, denies palpitations, loss of consciousness, no phlebitis, no claudication. · Respiratory: No cough or wheezing, no sputum production. No hemoptysis, pleuritic pain.  + Increasing shortness of breath. · Gastrointestinal: Mild abdominal fullness and discomfort, appetite loss, blood in stools. No change in bowel habits. No hematemesis  · Genitourinary: No dysuria, trouble voiding or hematuria. No nocturia or increased frequency. · Musculoskeletal:  No gait disturbance, weakness or joint complaints. · Integumentary: No rash or pruritis. · Neurological: No headache, diplopia, change in muscle strength, numbness or tingling. No change in gait, balance, coordination, mood, affect, memory, mentation, behavior. · Psychiatric: No anxiety or depression. · Endocrine: No temperature intolerance. No excessive thirst, fluid intake, or urination. No tremor. · Hematologic/Lymphatic: No abnormal bruising or bleeding, blood clots or swollen lymph nodes.   · Allergic/Immunologic: No nasal congestion or hives. Physical Examination:    Vital Signs: /72   Pulse 103   Temp 96.2 °F (35.7 °C) (Temporal)   Resp 18   Ht 5' 10\" (1.778 m)   Wt 257 lb (116.6 kg)   SpO2 99%   BMI 36.88 kg/m²   General appearance: Well preserved, mesomorphic body habitus, alert, no distress. Skin: Skin color, texture, turgor normal. No rashes or lesions. No induration or tightening palpated. Head: Normocephalic. No masses, lesions, tenderness or abnormalities  Eyes: Conjunctivae/corneas clear. PERRL, EOMs intact. Sclera non icteric. Ears: External ears normal. Canals clear. TM's clear bilaterally. Hearing normal to finger rub. Nose/Sinuses: Nares normal. Septum midline. Mucosa normal. No drainage or sinus tenderness. Oropharynx: Lips, mucosa, and tongue normal. Oropharynx clear with no exudate seen. Neck: Neck supple and symmetric. No adenopathy. Thyroid symmetric, normal size, without nodules. Trachea is midline. Carotids brisk in upstroke without bruits, no abnormal JVP noted at 45°. Chest: Even excursion  Lungs: Lungs clear to auscultation bilaterally. No retractions or use of accessory muscles. No tactile vocal fremitus. No rhonchi, crackles or rales. Heart:  S1 > S2. Irregular, irregular rhythm. No gallop or murmur. No rub, palpable thrill or heave noted. PMI 5th intercostal space midclavicular line. Abdomen: Abdomen soft, grossly protuberant, non-tender. BS normal. No masses, organomegaly. No hernia noted. Extremities: Extremities normal. No deformities, edema, or skin discoloration. No cyanosis or clubbing noted to the nails. Peripheral pulses present 2+ upper extremities and present 1+  lower extremities. Musculoskeletal: Spine ROM normal. Muscular strength intact. Neuro: Cranial nerves intact. Motor: Strength 5/5 in all extremities. Reflexes 2+ in all extremities. No focal weakness. Sensory: grossly normal to touch. Coordination intact.     Pertinent Labs:  CBC:   Recent Labs 20  0330   WBC 10.5   HGB 12.2*        BMP:  Recent Labs     20  0330      K 4.6   CL 95*   CO2 24   BUN 56*   CREATININE 2.6*   GLUCOSE 118*   LABGLOM 24     ABGs:   Lab Results   Component Value Date    PH 7.424 2015    PO2 72.1 2015    PCO2 46.4 2015     INR:   No results for input(s): INR in the last 72 hours. PRO-BNP:   Lab Results   Component Value Date    PROBNP 23,841 (H) 2020    PROBNP 39,871 (H) 01/15/2020      Cardiac Injury Profile:   Recent Labs     20  0850 20  1429   TROPONINI 0.02 0.02      Lipid Profile:   Lab Results   Component Value Date    TRIG 111 2016    HDL 24 2016    LDLCALC 47 2016    CHOL 93 2016      Hemoglobin A1C: No components found for: HGBA1C   ECG:  See report    Radiology:  Xr Chest Portable    Result Date: 2020  Patient MRN: [id number] : 1945 Age:  69 years Gender: Male Order Date: 2020 3:15 AM Exam: XR CHEST PORTABLE Number of Images: 1 view Indication:   sob sob Comparison: 1/15/2020 Findings: The heart is borderline The lung fields demonstrate no significant pulmonary vascular congestion and edema. The aorta is tortuous ectatic and calcified. There are infiltrates throughout the lung fields which are likely chronic     Cardiomegaly Findings compatible with atherosclerotic disease of the aorta. No acute infiltrate        Assessment:    Principal Problem:    Atrial fibrillation with RVR ([facility])  Active Problems:    Hyperlipidemia    Acute on chronic systolic CHF (congestive heart failure) ([facility])    HTN (hypertension), benign    CKD (chronic kidney disease) stage 4, GFR 15-29 ml/min ([facility])    Acute kidney injury superimposed on CKD ([facility] [address]  Resolved Problems:    * No resolved hospital problems. *      Plan:  Mr. Mehnaz Grady returns to the hospital with recurrent CHF as well as atrial fibrillation with a rapid ventricular response.   Unfortunately his renal function is markedly decreased thus

## 2020-02-02 NOTE — PROGRESS NOTES
Subjective: The patient is awake and alert. No acute events overnight. Denies chest pain, angina, SOB   Slept well last night with Seroquel on board    Objective:    /81   Pulse 102   Temp 96.3 °F (35.7 °C) (Axillary)   Resp 18   Ht 5' 10\" (1.778 m)   Wt 257 lb (116.6 kg)   SpO2 97%   BMI 36.88 kg/m²     In: 1780 [P.O.:1780]  Out: 1175     HEENT: NCAT,  PERRLA, No JVD  Heart:  RRR, no murmurs, gallops, or rubs.   Lungs:  CTA bilaterally, no wheeze, rales or rhonchi  Abd: bowel sounds present, nontender, nondistended, no masses  Extrem:  No clubbing, cyanosis, or edema     Recent Labs     02/01/20  0330 02/02/20  0606   WBC 10.5 9.1   HGB 12.2* 11.4*   HCT 40.9 37.3    255       Recent Labs     02/01/20  0330 02/02/20  0606    136   K 4.6 4.3   CL 95* 101   CO2 24 19*   BUN 56* 60*   CREATININE 2.6* 2.8*   CALCIUM 9.2 8.7       Assessment:    Patient Active Problem List   Diagnosis    Hyperlipidemia    CAD (coronary artery disease)    S/P CABG (coronary artery bypass graft)    Obesities, morbid (HCC)    SH[facility]acute kidney [facility]) ([facility] [address]    CARLTON (obstructive sleep apnea)    Abdominal aortic aneurysm without rupture ([facility]    Acute on chronic systolic CHF (congestive heart failure) ([facility])    Chronic kidney disease, stage III (moderate) ([facility])    HTN (hypertension), benign    Acute decompensated heart failure (HCC)    Left ventricular systolic dysfunction    CKD (chronic kidney disease) stage 4, GFR 15-29 ml/min ([facility])    Unable to ambulate    Left inguinal pain    Non-recurrent unilateral inguinal hernia without obstruction or gangrene    Atrial fibrillation with RVR ([facility])    Hyperkalemia    Lactic acidosis    Acute kidney injury superimposed on CKD ([address]    Acute hypoxemic respiratory failure ([facility])    Prolonged Q-T interval on ECG    Morbid obesity with BMI of 40.0-44.9, adult ([facility])    Hyperuricemia       Plan:    Admitted to [facility] for atrial fib rvr with chf exacerbation   Rate control /on amiodarone now per cards  Continue oac  IV diuresis with bumex drip, fluid restriction, low-salt diet  Echo if not done  w/i past 1 year   Cards funmi -Dr. [place] Doctor following  No function currently near his baseline of around 2.6     DVT Prophylaxis   PT/OT  Discharge planning-once euvolemic may be 24 hours      All consultants notes reviewed    Hansa Monroy MD  1:15 PM  2/2/2020

## 2020-02-03 NOTE — PROGRESS NOTES
PROGRESS NOTE       PATIENT PROBLEM LIST:  Principal Problem:    Atrial fibrillation with RVR ([facility])  Active Problems:    Hyperlipidemia    Acute on chronic systolic CHF (congestive heart failure) ([facility])    HTN (hypertension), benign    CKD (chronic kidney disease) stage 4, GFR 15-29 ml/min ([facility])    Acute kidney injury superimposed on CKD ([facility]  Resolved Problems:    * No resolved hospital problems. *      SUBJECTIVE:  Dov Rae states his breathing has improved but he remains in atrial fibrillation but now with a controlled response. REVIEW OF SYSTEMS:  General ROS: negative for - fatigue, malaise,  weight gain or weight loss  Psychological ROS: negative for - anxiety , depression  Ophthalmic ROS: negative for - decreased vision or visual distortion. ENT ROS: negative  Allergy and Immunology ROS: negative  Hematological and Lymphatic ROS: negative  Endocrine: no heat or cold intolerance and no polyphagia, polydipsia, or polyuria  Respiratory ROS: positive for - shortness of breath  Cardiovascular ROS: positive for - dyspnea on exertion, edema, irregular heartbeat, rapid heart rate and shortness of breath. Gastrointestinal ROS: no abdominal pain, change in bowel habits, or black or bloody stools  Genito-Urinary ROS: no nocturia, dysuria, trouble voiding, frequency or hematuria  Musculoskeletal ROS: negative for- myalgias, arthralgias, or claudication  Neurological ROS: no TIA or stroke symptoms otherwise no significant change in symptoms or problems since yesterday as documented in previous progress notes.     SCHEDULED MEDICATIONS:   apixaban  5 mg Oral BID    aspirin  81 mg Oral Daily    buPROPion  150 mg Oral QAM    cefdinir  300 mg Oral BID    hydrALAZINE  10 mg Oral TID    isosorbide mononitrate  60 mg Oral Daily    levothyroxine  25 mcg Oral Daily    melatonin  5 mg Oral Daily    metoprolol tartrate  50 mg Oral BID    sodium chloride flush  10 mL Intravenous 2 times per day    pulmonary vascular congestion and edema. The aorta is tortuous ectatic and calcified. There are infiltrates throughout the lung fields which are likely chronic     Cardiomegaly Findings compatible with atherosclerotic disease of the aorta. No acute infiltrate        Xr Chest Portable    Result Date: 1/15/2020  Patient MRN:  [id number] : 1945 Age: 69 years Gender: Male Order Date:  1/15/2020 4:00 AM EXAM: XR CHEST PORTABLE one image INDICATION:  cp cp COMPARISON: 2020 FINDINGS: There is cardiomegaly. There is vascular congestion with the perihilar and bibasilar infiltrates and small pleural effusions. There is COPD. Mild CHF without edema. Xr Chest Portable    Result Date: 2020  Patient MRN:  [id number] : 1945 Age: 69 years Gender: Male Order Date:  2020 7:30 PM EXAM: XR CHEST PORTABLE COMPARISON: 2019 INDICATION:  chest pain chest pain FINDINGS: There are median sternotomy wires. There is moderate cardiomegaly appearing similar since prior. There is interstitial prominence bilaterally. No evidence pleural effusion. No pneumothorax. 1. Moderate cardiomegaly appearing similar since prior. 2. Interstitial prominence bilaterally which has increased since prior. Findings could suggest developing pulmonary vascular congestion or peribronchial inflammatory changes. Us Dup Lower Extremity Left Rosanna    Result Date: 2020  Patient MRN:  [id number] : 1945 Age: 69 years Gender: Male Order Date:  2020 8:00 PM EXAM: US DUP LOWER EXTREMITY LEFT ROSANNA COMPARISON: None INDICATION:  Discomfort Discomfort TECHNIQUE: Multiple real-time sonographic images of the left lower extremity were obtained. FINDINGS: Normal compression and augmentation is seen. No evidence to suggest deep venous thrombosis. No additional sonographic abnormalities. No evidence to suggest deep venous thrombosis of the left lower extremity.      Us Retroperitoneal Complete    Result Date:

## 2020-02-03 NOTE — PROGRESS NOTES
[id number]   Department of Pharmacy   Pharmacist Transition of Care Services         Patient Demographics  Name:  Kelechi Whitehead   Medical Record Number:  [id number]  Gender:  male   Age:  69 y.o. YOB: 1945    Primary Care Physician: Hakeem Mcgovern MD  Primary Care Physician phone number:  [phone]  Readmission Risk (% from EPIC Patient List): 33%     Patient plans to participate in [address] [facility] (Y/N): N    Pharmacist Review and Summary of Medications     Date of last reviewed/update: 2/3/20     Category Comments   New Medication Started   1. Amiodarone 200 mg BID  2. Duonebs  3. Quetiapine 25 mg nightly (baseline QTc 493) - changed to trazodone 50 mg nightly prn  4. Allopurinol 100 mg daily         Change in Outpatient Medication  (Dosage Form, Route,   Dose, or Frequency) 1. Bumetanide PO to IV  2. Metoprolol succinate 25 mg BID changed to metoprolol succinate 50 mg daily       Discontinued Outpatient Medication   (or on Hold During Admission) 1. Tizanidine - on hold         Other Patient unsure which metoprolol (succinate vs tartrate) he was taking prior to admission. Succinate filled most recently per pharmacy. Patient states he was taking 25 mg BID (recently decreased from 50 mg BID). Pharmacist Patient Education:    Date  Person Educated Content of Education    2/3/20 patient Counseled on amiodarone, allopurinol, trazodone/quetiapine. Gave handouts. Documentation of Pharmacist Interventions and Follow-up Plan:     The following Pharmacist Transition of Care Services were completed:   [x]  Reviewed and summarized medication changes  [x]  Entire home medication list was reviewed for accuracy (sources: SureScripts, patient)  [x]  Home medication list was updated or corrected     []  Reviewed discharge medication reconciliation  []  Discharge medication list was updated or corrected  [x]  Patient education was provided on new medications  []

## 2020-02-03 NOTE — CARE COORDINATION
SOCIAL WORK/DISCHARGE PLANNING;  Care coordination assessment completed with pt. He is alert and oriented. Pt is sitting at edge of bed. He reported being independent. Pt lives alone in a 1&1/2 story plan home. Pt states he stays on the first. He used a cane for ambulation and does not have any other DME. Pt explained that he has someone through his insurance that comes out once a week(Thursday's) and does light housekeeping. Pt also states he thinks he has a nurse that comes out but he doesn't know which agency. He does not think that he really need a nurse. I encouraged pt to continue with this service especially since he has been readmitted to the hospital. He agreed. Pt feels he will be able to return home at discharge and states he has family that will transport him home. Pt's pcp is Dr. Cyndie Neville and he goes to [facility] [address] [facility]. Social work will follow pt for care coordination.    Susan ARRIAGA

## 2020-02-03 NOTE — PROGRESS NOTES
[facility]  Nephrology Attending Progress Note  Leilani Lew. John Gonzalez MD        SUBJECTIVE:     2/1:  The pt is a 70 yo male who presented with sob. He could not lie down and hasnt slept in several days. He was not able to walk more than a few feet without becoming sob. He has a pmh of cad sp acb, gerd, HFpEF with ef of 40%, mod-sev p htn, hyperlipidemia, htn, carlton, afib. He is followed by dr Elisha Stoll. He was found to be in afib with rvr in the er. He was started on iv bumex bid for chf. He as admitted and discharged 1/21 with chf and afib with rvr as well. He was also taking a lot of nsaids and developed arf and required dialysis x 2. He pulled out his roche during that admission and required cbi. His cr at this time is 2. 6. his last cr on 1/28 was 1.9 and was 2.8 on 1/21.     2/2: pt seen in room, sob improving, no cp or diarrhea. No roche and hasnt saved all of the urine. Doesn't want to be on bumex  drip    2/3/2020- awake and alert. He is apologizing he has not been collecting his urine since lasix drip stopped.      PROBLEM LIST:    Patient Active Problem List   Diagnosis    Hyperlipidemia    CAD (coronary artery disease)    S/P CABG (coronary artery bypass graft)    Obesities, morbid (HCC)    SHARON (acute kidney injury) ([address]    CARLTON (obstructive sleep apnea)    Abdominal aortic aneurysm without rupture (HCC)    Acute on chronic systolic CHF (congestive heart failure) (HCC)    Chronic kidney disease, stage III (moderate) (HCC)    HTN (hypertension), benign    Acute decompensated heart failure ([address]    Left ventricular systolic dysfunction    CKD (chronic kidney disease) stage 4, GFR 15-29 ml/min ([address]    Unable to ambulate    Left inguinal pain    Non-recurrent unilateral inguinal hernia without obstruction or gangrene    Atrial fibrillation with RVR (HCC)    Hyperkalemia    Lactic acidosis    Acute kidney injury superimposed on CKD ([address]    Acute hypoxemic respiratory failure ([address]    Prolonged Q-T interval on ECG    Morbid obesity with BMI of 40.0-44.9, adult (HCC)    Hyperuricemia        PAST MEDICAL HISTORY:    Past Medical History:   Diagnosis Date    CAD (coronary artery disease)     GERD (gastroesophageal reflux disease)     HFrEF (heart failure with reduced ejection fraction) ([facility] 1/25/16 1/22/16- echo- LVEF 20-25%, mild MR, pulmonary hypertension moderate-severe, aortic valve mildly sclerotic (5/26/15- echocardiogram- LVEF 40%, left atrium moderately dilated, mild tricuspid regurgitation)    History of blood transfusion     Hyperlipidemia     Hypertension     Systolic and diastolic CHF, chronic ([facility]     8/9/15- cardiology consult combined systolic and diastolic heart failure    Toe cyanosis 12/25/2015       DIET:    DIET LOW SODIUM 2 GM;     PHYSICAL EXAM:     Patient Vitals for the past 24 hrs:   BP Temp Temp src Pulse Resp SpO2 Weight   02/03/20 1020 -- -- -- -- 24 98 % --   02/03/20 0810 (!) 120/56 98 °F (36.7 °C) Temporal 112 18 96 % --   02/03/20 0458 -- -- -- -- -- -- 250 lb 11.2 oz (113.7 kg)   02/02/20 2315 121/69 98 °F (36.7 °C) Temporal 90 18 98 % --   02/02/20 2134 -- -- -- 119 -- -- --   02/02/20 2018 -- -- -- -- -- 91 % --   02/02/20 1545 99/73 97.8 °F (36.6 °C) Temporal 104 18 99 % --   @      Intake/Output Summary (Last 24 hours) at 2/3/2020 1512  Last data filed at 2/3/2020 1327  Gross per 24 hour   Intake 1160 ml   Output 2400 ml   Net -1240 ml         Wt Readings from Last 3 Encounters:   02/03/20 250 lb 11.2 oz (113.7 kg)   01/28/20 266 lb (120.7 kg)   01/24/20 266 lb (120.7 kg)       Constitutional:  Pt is in no acute distress  Head: normocephalic, atraumatic  Neck: no JVD  Cardiovascular:  irregular rate and rhythm, no murmurs, gallops, or rubs  Respiratory:  Clear and equal bilaterally.    Gastrointestinal:  Distended, bs x 4  Ext: edema  Skin: dry, no rash  Neuro: aaox3    MEDS (scheduled):    colchicine  0.6 mg Oral Daily    allopurinol  100 mg Oral Daily    [START ON 2/4/2020] isosorbide mononitrate  30 mg Oral Daily    bumetanide  1 mg Intravenous BID    apixaban  5 mg Oral BID    aspirin  81 mg Oral Daily    buPROPion  150 mg Oral QAM    cefdinir  300 mg Oral BID    hydrALAZINE  10 mg Oral TID    levothyroxine  25 mcg Oral Daily    melatonin  5 mg Oral Daily    metoprolol tartrate  50 mg Oral BID    sodium chloride flush  10 mL Intravenous 2 times per day    ipratropium-albuterol  1 ampule Inhalation Q4H WA    amiodarone  200 mg Oral BID       MEDS (infusions):      MEDS (prn):  traZODone, sodium chloride flush, magnesium hydroxide, acetaminophen, diphenhydrAMINE-zinc acetate    DATA:    Recent Labs     02/01/20  0330 02/02/20  0606   WBC 10.5 9.1   HGB 12.2* 11.4*   HCT 40.9 37.3   MCV 96.2 94.9    255     Recent Labs     02/01/20  0330 02/02/20  0606 02/03/20  0608    136 140   K 4.6 4.3 4.2   CL 95* 101 98   CO2 24 19* 21*   BUN 56* 60* 65*   CREATININE 2.6* 2.8* 3.3*   LABGLOM 24 22 18   GLUCOSE 118* 99 92   CALCIUM 9.2 8.7 9.0   ALT 23  --   --    AST 20  --   --    BILITOT 0.8  --   --    ALKPHOS 78  --   --    MG  --  2.1 1.9       Lab Results   Component Value Date    LABALBU 4.0 02/01/2020    LABALBU 3.9 01/15/2020    LABALBU 4.0 01/05/2020     Lab Results   Component Value Date    TSH 5.570 (H) 01/21/2016     No results found for: PHART, PO2ART, BTQ7OLQ    Iron Studies:   Lab Results   Component Value Date    IRON 34 (L) 12/10/2015    TIBC 338 12/10/2015    FERRITIN 145 12/10/2015         IMPRESSION/RECOMMENDATIONS:        1)CKD Stage 3  Baseline cr 2.6   Sp hd x 2 earlier in month last admission  Creatinine 3.3 this am, did not collect urine overnight will follow output.       2) Acute systolic CHF  w/ elevated BNP  fluid restriction   Follow on bumex iv bid  Low salt diet  Pt doesn't want to be on bumex drip, changed back to bumex 1 mg iv q 12 on 2/3/2020  Wt down 257 to 250 since 2/1     3) A Fib:   Per

## 2020-02-03 NOTE — PROGRESS NOTES
[facility] Medicine    Subjective:  Pt alert conversive c/o gout flareup bilat feet      Current Facility-Administered Medications:     colchicine (COLCRYS) tablet 0.6 mg, 0.6 mg, Oral, Daily, Linda Thompson,     allopurinol (ZYLOPRIM) tablet 100 mg, 100 mg, Oral, Daily, [place] Thompson,     bumetanide (BUMEX) injection 1 mg, 1 mg, Intravenous, BID, Jose Luis Vasquez MD    [facility]) tablet 5 mg, 5 mg, Oral, BID, Analilia Dennison, PA, 5 mg at 02/02/20 2115    aspirin chewable tablet 81 mg, 81 mg, Oral, Daily, Rena Pollen, PA, 81 mg at 02/02/20 0948    buPROPion (WELLBUTRIN XL) extended release tablet 150 mg, 150 mg, Oral, QAM, Rena Pollen, PA, 150 mg at 02/02/20 0947    cefdinir (OMNICEF) capsule 300 mg, 300 mg, Oral, BID, Rena Pollen, PA, 300 mg at 02/02/20 2115    hydrALAZINE (APRESOLINE) tablet 10 mg, 10 mg, Oral, TID, Rena Pollen, PA, 10 mg at 02/02/20 2115    isosorbide mononitrate (IMDUR) extended release tablet 60 mg, 60 mg, Oral, Daily, Rena Pollen, PA, 60 mg at 02/02/20 0947    levothyroxine (SYNTHROID) tablet 25 mcg, 25 mcg, Oral, Daily, Rena Pollen, PA, 25 mcg at 02/03/20 0617    melatonin tablet 5 mg, 5 mg, Oral, Daily, Rena Pollen, PA, 5 mg at 02/02/20 2218    metoprolol tartrate (LOPRESSOR) tablet 50 mg, 50 mg, Oral, BID, Rena Pollen, PA, 50 mg at 02/02/20 2216    sodium chloride flush 0.9 % injection 10 mL, 10 mL, Intravenous, 2 times per day, Rena Pollen, PA, 10 mL at 02/02/20 2220    sodium chloride flush 0.9 % injection 10 mL, 10 mL, Intravenous, PRN, Rena Pollen, PA    magnesium hydroxide (MILK OF MAGNESIA) 400 MG/5ML suspension 30 mL, 30 mL, Oral, Daily PRN, Rena Pollen, PA    acetaminophen (TYLENOL) tablet 650 mg, 650 mg, Oral, Q4H PRN, Rena Pollen, PA    ipratropium-albuterol (DUONEB) nebulizer solution 1 ampule, 1 ampule, Inhalation, Q4H WA, Rena Pollen, [place], 1 ampule at 02/02/20 2018    QUEtiapine (SEROQUEL) tablet 25 mg, 25 mg, Oral, Nightly, Venice Rasmussen MD, 25 mg at 02/02/20 2218    diphenhydrAMINE-zinc acetate (BENADRYL) cream, , Topical, TID PRN, Venice Rasmussen MD    amiodarone (CORDARONE) tablet 200 mg, 200 mg, Oral, BID, Yuriy CutleralinaDO, 200 mg at 02/02/20 2115    Objective:    BP (!) 120/56   Pulse 112   Temp 98 °F (36.7 °C) (Temporal)   Resp 18   Ht 5' 10\" (1.778 m)   Wt 250 lb 11.2 oz (113.7 kg)   SpO2 96%   BMI 35.97 kg/m²     Heart:  irreg  Lungs:  CTA bilaterally, no wheeze, rales or rhonchi  Abd: bowel sounds present, nontender, nondistended, no masses  Extrem:  Edema legs tender to palp bilat feet    CBC with Differential:    Lab Results   Component Value Date    WBC 9.1 02/02/2020    RBC 3.93 02/02/2020    HGB 11.4 02/02/2020    HCT 37.3 02/02/2020     02/02/2020    MCV 94.9 02/02/2020    MCH 29.0 02/02/2020    MCHC 30.6 02/02/2020    RDW 16.1 02/02/2020    BANDSPCT 2 12/30/2015    METASPCT 1 12/30/2015    LYMPHOPCT 23.2 02/02/2020    MONOPCT 9.4 02/02/2020    BASOPCT 0.7 02/02/2020    MONOSABS 0.85 02/02/2020    LYMPHSABS 2.11 02/02/2020    EOSABS 0.78 02/02/2020    BASOSABS 0.06 02/02/2020     CMP:    Lab Results   Component Value Date     02/03/2020    K 4.2 02/03/2020    K 4.6 02/01/2020    CL 98 02/03/2020    CO2 21 02/03/2020    BUN 65 02/03/2020    CREATININE 3.3 02/03/2020    GFRAA 22 02/03/2020    LABGLOM 18 02/03/2020    GLUCOSE 92 02/03/2020    PROT 7.8 02/01/2020    LABALBU 4.0 02/01/2020    CALCIUM 9.0 02/03/2020    BILITOT 0.8 02/01/2020    ALKPHOS 78 02/01/2020    AST 20 02/01/2020    ALT 23 02/01/2020     Warfarin PT/INR:    Lab Results   Component Value Date    INR 1.7 01/28/2020    INR 3.4 01/17/2020    INR 1.8 01/05/2020    PROTIME 19.5 (H) 01/28/2020    PROTIME 38.9 (H) 01/17/2020    PROTIME 20.0 (H) 01/05/2020       Assessment:    Principal Problem:    Atrial fibrillation with RVR (HCC)  Active Problems:    Hyperlipidemia    Acute on chronic systolic CHF (congestive heart failure) (HCC)    HTN (hypertension), benign    CKD (chronic kidney disease) stage 4, GFR 15-29 ml/min (HCC)    Acute kidney injury superimposed on CKD ([facility] [address]  Resolved Problems:    * No resolved hospital problems.  *      Plan:  Start allopurinol/colchicine / change to po diuretic when ok with renal/cardio / dc planning home when ok with renal/cardio        Luisa Caballero  8:48 AM  2/3/2020

## 2020-02-04 NOTE — DISCHARGE INSTR - COC
-700 ml     I/O last 3 completed shifts:   In: 4067 [P.O.:1580; I.V.:10]  Out: 2150 [Urine:2150]    Safety Concerns:     [address] KAUR Safety Concerns:433512234}    Impairments/Disabilities:      [address] KAUR Impairments/Disabilities:363529077}    Nutrition Therapy:  Current Nutrition Therapy:   [address] NATASHA Diet List:958125879}    Routes of Feeding: {CHP DME Other Feedings:687439292}  Liquids: {Slp liquid thickness:98162}  Daily Fluid Restriction: {CHP DME Yes amt example:961773363}  Last Modified Barium Swallow with Video (Video Swallowing Test): {Done Not Done VYYB:549419043}    Treatments at the Time of Hospital Discharge:   Respiratory Treatments: ***  Oxygen Therapy:  {Therapy; copd oxygen:61708}  Ventilator:    { CC Vent RQCJ:607965777}    Rehab Therapies: {THERAPEUTIC INTERVENTION:3632010007}  Weight Bearing Status/Restrictions: [address] [facility] Weight Bearin}  Other Medical Equipment (for information only, NOT a DME order):  {EQUIPMENT:146953292}  Other Treatments: ***    Patient's personal belongings (please select all that are sent with patient):  {[facility] DME Belongings:217187049}    RN SIGNATURE:  {Esignature:111422113}    CASE MANAGEMENT/SOCIAL WORK SECTION    Inpatient Status Date: ***    Readmission Risk Assessment Score:  Readmission Risk              Risk of Unplanned Readmission:        36           Discharging to Facility/ Agency   · Name: [place] [address]  · Address:  · Phone:[phone]  · Fax:    Dialysis Facility (if applicable)   · Name  · Address:  · Dialysis Schedule:  · Phone:  · Fax:    / signature: {Esignature:392486710}    PHYSICIAN SECTION    Prognosis: {Prognosis:0676806964}    Condition at Discharge: [address] Patient Condition:455948670}    Rehab Potential (if transferring to Rehab): {Prognosis:8199300661}    Recommended Labs or Other Treatments After Discharge: ***    Physician Certification: I certify the above information and transfer of Dov Rae  is necessary for

## 2020-02-04 NOTE — PROGRESS NOTES
PROGRESS NOTE       PATIENT PROBLEM LIST:  Principal Problem:    Atrial fibrillation with RVR ([facility])  Active Problems:    Hyperlipidemia    Acute on chronic systolic CHF (congestive heart failure) ([facility])    HTN (hypertension), benign    CKD (chronic kidney disease) stage 4, GFR 15-29 ml/min ([facility])    Acute kidney injury superimposed on CKD ([facility]  Resolved Problems:    * No resolved hospital problems. *      SUBJECTIVE:  Elias Bruce states he feels much improved and denies any significant shortness of breath presently nor chest discomfort or palpitations. REVIEW OF SYSTEMS:  General ROS: negative for - fatigue, malaise,  weight gain or weight loss  Psychological ROS: negative for - anxiety , depression  Ophthalmic ROS: negative for - decreased vision or visual distortion. ENT ROS: negative  Allergy and Immunology ROS: negative  Hematological and Lymphatic ROS: negative  Endocrine: no heat or cold intolerance and no polyphagia, polydipsia, or polyuria  Respiratory ROS: positive for - shortness of breath  Cardiovascular ROS: positive for - dyspnea on exertion, edema, irregular heartbeat, rapid heart rate and shortness of breath. Gastrointestinal ROS: no abdominal pain, change in bowel habits, or black or bloody stools  Genito-Urinary ROS: no nocturia, dysuria, trouble voiding, frequency or hematuria  Musculoskeletal ROS: negative for- myalgias, arthralgias, or claudication  Neurological ROS: no TIA or stroke symptoms otherwise no significant change in symptoms or problems since yesterday as documented in previous progress notes.     SCHEDULED MEDICATIONS:   colchicine  0.6 mg Oral Daily    allopurinol  100 mg Oral Daily    isosorbide mononitrate  30 mg Oral Daily    metoprolol succinate  50 mg Oral Daily    bumetanide  1 mg Intravenous BID    apixaban  5 mg Oral BID    aspirin  81 mg Oral Daily    buPROPion  150 mg Oral QAM    cefdinir  300 mg Oral BID    hydrALAZINE  10 mg Oral TID    levothyroxine  25 heart is borderline The lung fields demonstrate no significant pulmonary vascular congestion and edema. The aorta is tortuous ectatic and calcified. There are infiltrates throughout the lung fields which are likely chronic     Cardiomegaly Findings compatible with atherosclerotic disease of the aorta. No acute infiltrate        Xr Chest Portable    Result Date: 1/15/2020  Patient MRN:  [id number] : 1945 Age: 69 years Gender: Male Order Date:  1/15/2020 4:00 AM EXAM: XR CHEST PORTABLE one image INDICATION:  cp cp COMPARISON: 2020 FINDINGS: There is cardiomegaly. There is vascular congestion with the perihilar and bibasilar infiltrates and small pleural effusions. There is COPD. Mild CHF without edema. Xr Chest Portable    Result Date: 2020  Patient MRN:  [id number] : 1945 Age: 69 years Gender: Male Order Date:  2020 7:30 PM EXAM: XR CHEST PORTABLE COMPARISON: 2019 INDICATION:  chest pain chest pain FINDINGS: There are median sternotomy wires. There is moderate cardiomegaly appearing similar since prior. There is interstitial prominence bilaterally. No evidence pleural effusion. No pneumothorax. 1. Moderate cardiomegaly appearing similar since prior. 2. Interstitial prominence bilaterally which has increased since prior. Findings could suggest developing pulmonary vascular congestion or peribronchial inflammatory changes. Us Dup Lower Extremity Left Rosanna    Result Date: 2020  Patient MRN:  [id number] : 1945 Age: 69 years Gender: Male Order Date:  2020 8:00 PM EXAM: US DUP LOWER EXTREMITY LEFT ROSANNA COMPARISON: None INDICATION:  Discomfort Discomfort TECHNIQUE: Multiple real-time sonographic images of the left lower extremity were obtained. FINDINGS: Normal compression and augmentation is seen. No evidence to suggest deep venous thrombosis. No additional sonographic abnormalities. No evidence to suggest deep venous thrombosis of the left lower extremity. Us Retroperitoneal Complete    Result Date: 1/15/2020  Comparison: CT abdomen and pelvis January 5, 2020. Real-time and Doppler sonography of the kidneys and urinary bladder (retroperitoneum). Right kidney measures 15.1 x 6.8 x 7.9 cm. Left kidney measures 12.2 x 4.7 x 4.9 cm. Urinary bladder is not demonstrated. Anechoic lesions with posterior acoustic enhancement are present within the bilateral kidneys, largest on the right measuring up to 5.9 cm. The kidneys are negative for evidence of solid mass or hydronephrosis. There is no evidence for renal calculus disease. Bilateral renal cysts. Nonvisualization urinary bladder. EKG: See Report  Echo: See Report      IMPRESSIONS:  Principal Problem:    Atrial fibrillation with RVR ([facility])  Active Problems:    Hyperlipidemia    Acute on chronic systolic CHF (congestive heart failure) ([facility])    HTN (hypertension), benign    CKD (chronic kidney disease) stage 4, GFR 15-29 ml/min ([facility])    Acute kidney injury superimposed on CKD ([address]  Resolved Problems:    * No resolved hospital problems. *      RECOMMENDATIONS:  Mr. Nubia Ku notably feels markedly improved despite decrease in renal function. He is anxious to go to [place] if he is a potential candidate for atrial fibrillation ablation once again. Certainly from a clinical standpoint he does seem improved with a slower heart rate while on his present medical regimen. I have spent more than 25 minutes face to face with Berna Preston and reviewing notes and laboratory data, with greater than 50% of this time instructing and counseling the patient  face to face regarding my findings and recommendations and I have answered all questions as posed to me by Mr. Nubia Ku. Farooq Osborn DO FACP,FACC,[facility]      NOTE:  This report was transcribed using voice recognition software.   Every effort was made to ensure accuracy; however, inadvertent computerized transcription errors may be present

## 2020-02-04 NOTE — PROGRESS NOTES
[address]   Department of Pharmacy   Pharmacist Transition of Care Services         Patient Demographics  Name:  Catie Salas   Medical Record Number:  [id number]  Gender:  male   Age:  69 y.o. YOB: 1945    Primary Care Physician: Suzanne Murphy MD  Primary Care Physician phone number:  [phone]  Readmission Risk (% from EPIC Patient List): 33%     Patient plans to participate in [address] [facility] to [facility] (Y/N): N    Pharmacist Review and Summary of Medications     Date of last reviewed/update: 2/4/20     Category Comments   New Medication Started   1. Amiodarone 200 mg daily  2. Trazodone 50 mg nightly prn  3. Allopurinol 100 mg daily         Change in Outpatient Medication  (Dosage Form, Route,   Dose, or Frequency) 1. Metoprolol succinate 25 mg BID changed to metoprolol succinate 50 mg daily       Discontinued Outpatient Medication   (or on Hold During Admission)          Other Patient unsure which metoprolol (succinate vs tartrate) he was taking prior to admission. Succinate filled most recently per pharmacy. Patient states he was taking 25 mg BID (recently decreased from 50 mg BID). Pharmacist Patient Education:    Date  Person Educated Content of Education    2/3/20 patient Counseled on amiodarone, allopurinol, trazodone/quetiapine. Gave handouts. Documentation of Pharmacist Interventions and Follow-up Plan:     The following Pharmacist Transition of Care Services were completed:   [x]  Reviewed and summarized medication changes  [x]  Entire home medication list was reviewed for accuracy (sources: [employer], patient)  [x]  Home medication list was updated or corrected     [x]  Reviewed discharge medication reconciliation (2/4/20)  []  Discharge medication list was updated or corrected  [x]  Patient education was provided on new medications  []  Patient education was provided on medication changes  []  Reviewed the After Visit Summary (AVS) with patient    Additional Interventions:  [x]  Inpatient prescriber was contacted and the following pharmacy recommendations        were accepted:     Isosorbide mononitrate inpatient dose adjusted to home dose. Change inpatient order to metoprolol succinate (as per updated home med list) - per Dr. Vinny Gray. [x] Other interventions:   Spoke with Dr. Toya Campbell. Switch quetiapine to trazodone to minimize QT prolongation. Amiodarone + quetiapine (category X interaction). Amiodarone + trazodone (category D interaction).          Pharmacist: Christofer Marshall PharmD, [facility]  Date:  2/4/2020 8:43 AM  Time spent counseling on medications: 15 minutes

## 2020-02-04 NOTE — CARE COORDINATION
SOCIAL WORK/DISCHARGE PLANNING;  Pt for discharge home today. Notified by [facility] that they are active with pt. Order for resume of care in chart. Alivia notified and faxed order.    Franco BENNETT

## 2020-02-04 NOTE — PROGRESS NOTES
Physical Therapy Initial Assessment     Name: Anant Uribe  : 1945  MRN: [id number]    Referring Provider:  Dr. Claudia Dolan    Date of Service: 2020    Evaluating PT:  Orgio Hanna, PT, DPT   [id number]    Room #:  7988/0732-N  Diagnosis:  Afib with RVR  PMHx:  CHF, HTN, CAD, CKD   Precautions:  SOB, Falls  Equipment Needs:  None    SUBJECTIVE:    Home: Pt lives alone in a 1.5 story home with 3 stairs to enter and 1 rail. Bed is on 1st floor and bath is on 1st floor. PLOF: Pt ambulated with SPC PTA. OBJECTIVE:   Initial Evaluation  Date: 2020 Treatment Short Term/ Long Term   Goals   AM-PAC 6 Clicks 61/41     Was pt agreeable to Eval/treatment? Y     Does pt have pain? N     Bed Mobility  Rolling: SBA  Supine to sit: SBA  Sit to supine: SBA  Scooting: SBA  IND   Transfers Sit to stand: SBA  Stand to sit: SBA  Stand pivot: SBA  IND   Ambulation    75 feet x 2 with SPC with SBA  150 feet with SPC with Mod I   Stair negotiation: ascended and descended  5 steps with 1 rail with SBA  5 steps with 1 rail with Mod I   ROM BUE:  See OT eval  BLE:  WNL     Strength BUE:  See OT eval  BLE:  4/5  4+/5   Balance Sitting EOB:  Supervision  Dynamic Standing:  SBA  Sitting EOB:  IND  Dynamic Standing:  IND     Pt is A & O x 4  Sensation:  Pt denies numbness and tingling to extremities  Edema:  None    Therapeutic Exercises:  None    Patient education  Pt educated on safety awareness, energy conservation, proper hand placement and sequencing for bed mobility and transfers. Patient response to education:   Pt verbalized understanding Pt demonstrated skill Pt requires further education in this area   y y y     ASSESSMENT:    Patient exhibits decreased strength, balance, coordination impairing functional mobility. Educated pt on techniques to improve safety and independence with bed mobility, transfers, balance, functional transfers, and functional mobility.  Pt sat EOB for 18 minutes with Supervision in order to improve static and dynamic sitting balance and activity tolerance. Pt ambulated with decreased nato and stride length 75' x 2 with SPC with SBA. Pt was slightly unsteady during ambulation and stairs with no LOB, SOB, or dizziness. Pt demonstrated good understanding of education/techniques requiring additional education/training as well as fair+ safety awareness. At end of session, pt was left in bed with call light in reach and all needs met. Pt would benefit from continued skilled PT services to increase functional independence and overall quality of life. Treatment:    Pt given VC for hand placement and sequencing to address deficits for bed mobility, transfers, ambulation. Pt given VC for safety throughout session to reduce risk of falls. Pt was given VC to increase stride length and nato to normalize gait mechanics and improve stability as well as instruction regarding heel to toe step through gait to improve dynamic standing balance. Pt's/ family goals   To improve strength, endurance, balance, decrease pain. Patient and or family understand(s) diagnosis, prognosis, and plan of care. PLAN:    PT care will be provided in accordance with the objectives noted above. Exercises and functional mobility practice will be used as well as appropriate assistive devices or modalities to obtain goals. Patient and family education will also be administered as needed. Frequency of treatments: 1x daily for 3-7x per week for 1-3 days.     Time in  804  Time out  847    (Evaluation time includes thorough review of current medical information, gathering information on past medical history/social history and prior level of function, completion of standardized testing/informal observation of tasks, assessment of data, and development of POC/Goals.)    Low Complexity Evaluation + Total Treatment Time  30    CPT codes:  [] Gait training [id number]   [] Manual therapy [id number]   [] Therapeutic activities 33327 05 minutes  [] Therapeutic exercises 16925   [] Neuromuscular reeducation 27127     Mimi Barrios, PT, DPT   [id number]

## 2020-02-04 NOTE — PROGRESS NOTES
tablet 650 mg, 650 mg, Oral, Q4H PRN, Rena Pollen, PA    ipratropium-albuterol (DUONEB) nebulizer solution 1 ampule, 1 ampule, Inhalation, Q4H [place], Bernerd Foot [place], [address], 1 ampule at 02/03/20 2104    diphenhydrAMINE-zinc acetate (BENADRYL) cream, , Topical, TID PRN, Capri Snell MD    Objective:    /67   Pulse 86   Temp 98.5 °F (36.9 °C) (Temporal)   Resp 18   Ht 5' 10\" (1.778 m)   Wt 249 lb 14.4 oz (113.4 kg)   SpO2 95%   BMI 35.86 kg/m²     Heart:  irreg  Lungs:  CTA bilaterally, no wheeze, rales or rhonchi  Abd: bowel sounds present, nontender, nondistended, no masses  Extrem:  Edema legs    CBC with Differential:    Lab Results   Component Value Date    WBC 8.0 02/04/2020    RBC 4.05 02/04/2020    HGB 11.5 02/04/2020    HCT 37.8 02/04/2020     02/04/2020    MCV 93.3 02/04/2020    MCH 28.4 02/04/2020    MCHC 30.4 02/04/2020    RDW 16.3 02/04/2020    BANDSPCT 2 12/30/2015    METASPCT 1 12/30/2015    LYMPHOPCT 23.2 02/02/2020    MONOPCT 9.4 02/02/2020    BASOPCT 0.7 02/02/2020    MONOSABS 0.85 02/02/2020    LYMPHSABS 2.11 02/02/2020    EOSABS 0.78 02/02/2020    BASOSABS 0.06 02/02/2020     CMP:    Lab Results   Component Value Date     02/04/2020    K 3.8 02/04/2020    K 4.6 02/01/2020    CL 97 02/04/2020    CO2 22 02/04/2020    BUN 62 02/04/2020    CREATININE 2.9 02/04/2020    GFRAA 26 02/04/2020    LABGLOM 21 02/04/2020    GLUCOSE 98 02/04/2020    PROT 7.8 02/01/2020    LABALBU 4.0 02/01/2020    CALCIUM 8.3 02/04/2020    BILITOT 0.8 02/01/2020    ALKPHOS 78 02/01/2020    AST 20 02/01/2020    ALT 23 02/01/2020     Warfarin PT/INR:    Lab Results   Component Value Date    INR 1.7 01/28/2020    INR 3.4 01/17/2020    INR 1.8 01/05/2020    PROTIME 19.5 (H) 01/28/2020    PROTIME 38.9 (H) 01/17/2020    PROTIME 20.0 (H) 01/05/2020       Assessment:    Principal Problem:    Atrial fibrillation with RVR ([facility])  Active Problems:    Hyperlipidemia    Acute on chronic systolic CHF (congestive heart failure) ([facility])    HTN (hypertension), benign    CKD (chronic kidney disease) stage 4, GFR 15-29 ml/min ([facility])    Acute kidney injury superimposed on CKD ([facility] [address]  Resolved Problems:    * No resolved hospital problems.  *      Plan:  Dc home outpt f/u in [facility]  7:55 AM  2/4/2020

## 2020-02-04 NOTE — PROGRESS NOTES
CLINICAL PHARMACY: DISCHARGE MED RECONCILIATION/REVIEW    [facility]) Select Patient?: No  Total # of Interventions Recommended: 0   -   Total # Interventions Accepted: 0  Intervention Severity:   - Level 1 Intervention Present?: No   - Level 2 #: 0   - Level 3 #: 0   Time Spent (min): 15    Additional Documentation: No major interventions due to preparation completed yesterday.

## 2020-02-08 NOTE — DISCHARGE SUMMARY
Dr. Any Farrell, call office for appointment.         Chris Pinto DO    D: 02/07/2020 15:51:08       T: 02/07/2020 20:41:35     [email or web address]  Job#: [id number]     Doc#: [id number]    CC:  Arnel Woo MD

## 2020-02-17 PROBLEM — I50.9 ACUTE DECOMPENSATED HEART FAILURE (HCC): Status: RESOLVED | Noted: 2019-01-01 | Resolved: 2020-01-01

## 2020-02-17 PROBLEM — I48.19 ATRIAL FIBRILLATION, PERSISTENT (HCC): Chronic | Status: ACTIVE | Noted: 2020-01-01

## 2020-02-17 PROBLEM — J96.01 ACUTE HYPOXEMIC RESPIRATORY FAILURE (HCC): Status: RESOLVED | Noted: 2020-01-01 | Resolved: 2020-01-01

## 2020-02-17 PROBLEM — I50.42 CHRONIC COMBINED SYSTOLIC AND DIASTOLIC CHF (CONGESTIVE HEART FAILURE) (HCC): Chronic | Status: ACTIVE | Noted: 2020-01-01

## 2020-02-17 PROBLEM — R07.9 CHEST PAIN: Status: ACTIVE | Noted: 2020-01-01

## 2020-02-17 PROBLEM — E66.9 OBESITY: Chronic | Status: ACTIVE | Noted: 2020-01-01

## 2020-02-17 PROBLEM — N18.9 ACUTE KIDNEY INJURY SUPERIMPOSED ON CKD (HCC): Status: RESOLVED | Noted: 2020-01-01 | Resolved: 2020-01-01

## 2020-02-17 PROBLEM — E87.20 LACTIC ACIDOSIS: Status: RESOLVED | Noted: 2020-01-01 | Resolved: 2020-01-01

## 2020-02-17 PROBLEM — E87.5 HYPERKALEMIA: Status: RESOLVED | Noted: 2020-01-01 | Resolved: 2020-01-01

## 2020-02-17 PROBLEM — I48.91 ATRIAL FIBRILLATION WITH RVR (HCC): Status: RESOLVED | Noted: 2020-01-01 | Resolved: 2020-01-01

## 2020-02-17 PROBLEM — N17.9 ACUTE KIDNEY INJURY SUPERIMPOSED ON CKD (HCC): Status: RESOLVED | Noted: 2020-01-01 | Resolved: 2020-01-01

## 2020-02-17 NOTE — H&P
[address]                  [address] [facility]  [facility]                              HISTORY AND PHYSICAL    PATIENT NAME: Rosalind Mart                      :        1945  MED REC NO:   [id number]                            ROOM:       8209  ACCOUNT NO:   [de-identified]                           ADMIT DATE: 2020  PROVIDER:     Leeroy Patel DO      CHIEF COMPLAINT:  Chest pain, shortness of breath. HISTORY OF PRESENT ILLNESS:  The patient is a 42-year-old  male  who presented to the emergency room complaining of chest pain and  shortness of breath. He has a history of chronic CHF. He just  hospitalized and discharged home. He is scheduled for cardiac ablation  at [facility] with Dr. Lisandro Watt on 2020. He sees Dr. Dany Franco  locally. He was seen in the emergency room and assigned to observation  status for further evaluation and treatment. Despite the patient's  chronic CHF, he states he does not have a scale that works at home. His  primary care physician is Dr. Washington Hudson. PAST MEDICAL HISTORY:  Coronary artery disease, GERD, combined  systolic/diastolic congestive heart failure, hyperlipidemia,  hypertension, chronic insomnia, hypothyroidism, atrial fibrillation,  chronic amiodarone therapy, chronic Eliquis therapy. PAST SURGICAL HISTORY:  Coronary artery bypass graft. MEDICATIONS:  Prior to admission:  Bumex, GlycoLax, Nizoral, Imdur,  Cordarone, Desyrel, Apresoline, Zyloprim, aspirin, Kenalog, Milk of  Magnesia, Eliquis, Wellbutrin XL, Synthroid, Zanaflex, Toprol XL. SOCIAL HISTORY:  The patient quit tobacco.  Denies alcohol. REVIEW OF SYSTEMS:  Remarkable for above-stated chief complaint plus  chronic insomnia. ALLERGIES:  To CRESTOR. PHYSICAL EXAMINATION:  GENERAL APPEARANCE:  Physical exam reveals a 42-year-old  male  who is alert, cooperative and a fair historian.   VITAL SIGNS:  On admission, temperature 97.7, pulse 89, respirations 20,  blood pressure 122/95. VITAL SIGNS:  On admission as per emergency room record which are  notable for temperature 96.9, pulse 78, respirations 17, blood pressure  157/88. HEENT:  Head:  Normocephalic, atraumatic. Eyes:  Pupils equal and  reactive to light. Extraocular muscles intact. Fundi not well  visualized. Nose:  No obstruction, polyp or discharge noted. Mouth  mucosa without lesion. Pharynx noninjected without exudate. NECK:  Supple. No JVD. No thyromegaly. No carotid bruits. HEART:  Irregularly irregular without murmur. LUNGS:  Clear to auscultation bilaterally. ABDOMEN:  Obese, positive bowel sounds, soft, nontender. No rebound. No guarding. No hepatosplenomegaly. No masses. BACK:  With increased thoracic kyphosis. EXTREMITIES:  Wit edema in the bilateral lower extremities. LYMPH NODES:  No adenopathy noted. SKIN:  Without rash or lesion. IMPRESSION:  Chest pain, chronic systolic/diastolic congestive heart  failure, chronic anticoagulation, chronic amiodarone therapy, chronic  Eliquis therapy, coronary artery disease, GERD, obesity, hyperlipidemia,  hypothyroidism, hypertension, status post coronary artery bypass graft,  CKD 3, obstructive sleep apnea. PLAN:  Assign the patient to observation status. Cycle cardiac enzymes. Cardiac telemetry. Cardiology to see. Discharge plan, home when  stable.         Gifty Theodore DO    D: 02/17/2020 13:09:01       T: 02/17/2020 13:13:34     [email or web address]  Job#: [id number]     Doc#: [id number]    CC:

## 2020-02-17 NOTE — ED NOTES
Pt c/o itching due to eczema. Dr Chani Cartagena notified.  Verbal Ok to given PO vistaril 25 mg     Lyndsey Reddy RN  02/17/20 0057

## 2020-02-17 NOTE — ED PROVIDER NOTES
Chief Complaint   Patient presents with    Chest Pain     patient c/o CP and SOB, states intermittent since yesterday, states he thinks he is in afib, scheduled for ablation on March 3rd with Dr. Faythe Leyden at [address] is a 72-year-old male with a past medical history of CHF, A. fib, CKD, hypertension, who presents today for chest pain and shortness of breath. Patient states symptoms started yesterday, and have continued to progress throughout the day. He describes the chest pain as a heavy pressure in the middle of her chest does not radiate anywhere. He states he has a history of going in and out of A. fib, and has been to the hospital 9 times since the end of December. States he is also short of breath with his pain. He states he is scheduled for an ablation at [facility] March 3, and follows with cardiology Dr. Faythe Leyden in [place], and has seen Dr. Al Gonzalez here in [facility]. Last echo was 1/15 which showed EF of 25%. He is anticoagulated on Eliquis. Patient has history of coronary artery bypass, and had an ablation several years ago. The history is provided by the patient. No  was used. Review of Systems   Constitutional: Negative for chills and fever. HENT: Negative for ear pain, sinus pressure and sore throat. Eyes: Negative for pain, discharge and redness. Respiratory: Positive for shortness of breath. Negative for cough and wheezing. Cardiovascular: Positive for chest pain. Negative for leg swelling. Gastrointestinal: Negative for abdominal pain, diarrhea, nausea and vomiting. Genitourinary: Negative for dysuria and frequency. Musculoskeletal: Negative for arthralgias and back pain. Skin: Negative for rash and wound. Neurological: Negative for dizziness, weakness and headaches. Hematological: Negative for adenopathy. Psychiatric/Behavioral: Negative for behavioral problems and confusion.    All other systems reviewed and are failure with reduced ejection fraction) ([facility] [address] History of blood transfusion, Hyperlipidemia, Hypertension, Systolic and diastolic CHF, chronic ([facility] [address] and Toe cyanosis. Past Surgical History:  has a past surgical history that includes Cardiac surgery; ECHO Compl W Dop Color Flow (5/26/2015); and Coronary artery bypass graft (10/4/2005). Social History:  reports that he quit smoking about 14 years ago. He has never used smokeless tobacco. He reports that he does not drink alcohol or use drugs. Family History: family history includes Cancer in his father; Coronary Art Dis in his brother; Heart Disease in his maternal grandfather and maternal uncle; High Cholesterol in his brother; Other in his mother. The patients home medications have been reviewed.     Allergies: Ambien [zolpidem tartrate] and Crestor [rosuvastatin calcium]    -------------------------------------------------- RESULTS -------------------------------------------------    LABS:  Results for orders placed or performed during the hospital encounter of 02/17/20   CBC Auto Differential   Result Value Ref Range    WBC 8.2 4.5 - 11.5 E9/L    RBC 3.95 3.80 - 5.80 E12/L    Hemoglobin 11.6 (L) 12.5 - 16.5 g/dL    Hematocrit 37.9 37.0 - 54.0 %    MCV 95.9 80.0 - 99.9 fL    MCH 29.4 26.0 - 35.0 pg    MCHC 30.6 (L) 32.0 - 34.5 %    RDW 16.1 (H) 11.5 - 15.0 fL    Platelets 537 893 - 836 E9/L    MPV 11.1 7.0 - 12.0 fL    Neutrophils % 62.9 43.0 - 80.0 %    Immature Granulocytes % 0.2 0.0 - 5.0 %    Lymphocytes % 21.3 20.0 - 42.0 %    Monocytes % 9.6 2.0 - 12.0 %    Eosinophils % 5.4 0.0 - 6.0 %    Basophils % 0.6 0.0 - 2.0 %    Neutrophils Absolute 5.13 1.80 - 7.30 E9/L    Immature Granulocytes # 0.02 E9/L    Lymphocytes Absolute 1.74 1.50 - 4.00 E9/L    Monocytes Absolute 0.78 0.10 - 0.95 E9/L    Eosinophils Absolute 0.44 0.05 - 0.50 E9/L    Basophils Absolute 0.05 0.00 - 0.20 E9/L   Comprehensive Metabolic Panel w/ Reflex to MG   Result Value Ref

## 2020-02-18 NOTE — CONSULTS
CARDIOLOGY CONSULTATION    Patient Name:  Rae Ball    :  1945    Reason for Consultation:   Recurrent CHF and atrial fibrillation    History of Present Illness:   Rae Ball returns to [address] following history of increasing shortness of breath and a nondescript chest discomfort which seems to be exacerbated with inspiration. He was just released from the hospital with similar symptomatology and persistent atrial fibrillation for which he was scheduled to undergo ablation with Dr. Birtha Lesches at [facility]. Mr. Nicholas De La Fuente, has a history of coronary artery bypass surgery as well as morbid obesity obstructive sleep apnea importantly recurrent persistent atrial fibrillation for which he underwent a previous pulmonary vein isolation ablation approximately 4 .5 years ago. He does however, experience shortness of breath with mild exertion. He is now readmitted for further diagnostic studies and readjustment of his medical regimen. Past Medical History:   has a past medical history of CAD (coronary artery disease), GERD (gastroesophageal reflux disease), HFrEF (heart failure with reduced ejection fraction) ([address] History of blood transfusion, Hyperlipidemia, Hypertension, Systolic and diastolic CHF, chronic ([address] and Toe cyanosis. Surgical History:   has a past surgical history that includes Cardiac surgery; ECHO Compl W Dop Color Flow (2015); and Coronary artery bypass graft (10/4/2005). Social History:   reports that he quit smoking about 14 years ago. He has never used smokeless tobacco. He reports that he does not drink alcohol or use drugs. Family History:  family history includes Cancer in his father; Coronary Art Dis in his brother; Heart Disease in his maternal grandfather and maternal uncle; High Cholesterol in his brother; Other in his mother. Medications:  Prior to Admission medications    Medication Sig Start Date End Date Taking?  Authorizing Provider bumetanide (BUMEX) 1 MG tablet Take 1 mg by mouth 2 times daily   Yes Historical Provider, MD   ketoconazole (NIZORAL) 2 % cream Apply topically 2 times daily Apply to groin   Yes Historical Provider, MD   isosorbide mononitrate (IMDUR) 60 MG extended release tablet Take 0.5 tablets by mouth daily 2/4/20  Yes Simona Console, DO   amiodarone (CORDARONE) 200 MG tablet Take 1 tablet by mouth daily 2/4/20  Yes Simona Console, DO   traZODone (DESYREL) 50 MG tablet Take 1 tablet by mouth nightly as needed for Sleep 2/4/20  Yes Simona Console, DO   hydrALAZINE (APRESOLINE) 10 MG tablet Take 1 tablet by mouth 3 times daily 2/4/20  Yes Simona Console, DO   metoprolol succinate (TOPROL XL) 50 MG extended release tablet Take 1 tablet by mouth daily 2/4/20  Yes Simona Console, DO   allopurinol (ZYLOPRIM) 100 MG tablet Take 1 tablet by mouth daily 2/4/20  Yes Simona Console, DO   aspirin 81 MG chewable tablet Take 1 tablet by mouth daily 1/22/20  Yes Laura Benjamin MD   triamcinolone (KENALOG) 0.1 % ointment Apply 1 each topically 2 times daily    Yes Historical Provider, MD   apixaban (ELIQUIS) 5 MG TABS tablet Take 1 tablet by mouth 2 times daily 12/25/19  Yes Simona Console, DO   buPROPion (WELLBUTRIN XL) 150 MG extended release tablet Take 150 mg by mouth every morning   Yes Historical Provider, MD   levothyroxine (SYNTHROID) 25 MCG tablet Take 1 tablet by mouth Daily 11/25/15  Yes Simona Console, DO   tiZANidine (ZANAFLEX) 4 MG tablet   Take 4 mg by mouth every 8 hours as needed    Yes Historical Provider, MD   polyethylene glycol (GLYCOLAX) powder Take 17 g by mouth daily as needed (constipation)    Historical Provider, MD   magnesium hydroxide (MILK OF MAGNESIA) 400 MG/5ML suspension Take 30 mLs by mouth daily as needed for Constipation 1/7/20   Jagdeep Ramos MD       Allergies:  Ambien [zolpidem tartrate] and Crestor [rosuvastatin calcium]     Review of Systems:   · Constitutional: there has been no unanticipated weight loss. There's been no significant change in energy level, sleep pattern or activity level. No fever chills or rigors. · Eyes: No visual changes or diplopia. No scleral icterus. · ENT: No Headaches, hearing loss or vertigo. No mouth sores or sore throat. No change in taste or smell. · Cardiovascular: No chest discomfort, + dyspnea on exertion and now at rest, denies palpitations, loss of consciousness, no phlebitis, no claudication. · Respiratory: No cough or wheezing, no sputum production. No hemoptysis, pleuritic pain.  + Increasing shortness of breath. · Gastrointestinal: Mild abdominal fullness and discomfort, appetite loss, blood in stools. No change in bowel habits. No hematemesis  · Genitourinary: No dysuria, trouble voiding or hematuria. No nocturia or increased frequency. · Musculoskeletal:  No gait disturbance, weakness or joint complaints. · Integumentary: No rash or pruritis. · Neurological: No headache, diplopia, change in muscle strength, numbness or tingling. No change in gait, balance, coordination, mood, affect, memory, mentation, behavior. · Psychiatric: No anxiety or depression. · Endocrine: No temperature intolerance. No excessive thirst, fluid intake, or urination. No tremor. · Hematologic/Lymphatic: No abnormal bruising or bleeding, blood clots or swollen lymph nodes. · Allergic/Immunologic: No nasal congestion or hives. Physical Examination:    Vital Signs: BP (!) 130/99   Pulse 119   Temp 98.2 °F (36.8 °C) (Temporal)   Resp 18   Ht 5' 10\" (1.778 m)   Wt 255 lb (115.7 kg)   SpO2 97%   BMI 36.59 kg/m²   General appearance: Well preserved, mesomorphic body habitus, alert, no distress. Skin: Skin color, texture, turgor normal. No rashes or lesions. No induration or tightening palpated. Head: Normocephalic. No masses, lesions, tenderness or abnormalities  Eyes: Conjunctivae/corneas clear. PERRL, EOMs intact. Sclera non icteric.   Ears: External ears normal. Canals clear. TM's clear bilaterally. Hearing normal to finger rub. Nose/Sinuses: Nares normal. Septum midline. Mucosa normal. No drainage or sinus tenderness. Oropharynx: Lips, mucosa, and tongue normal. Oropharynx clear with no exudate seen. Neck: Neck supple and symmetric. No adenopathy. Thyroid symmetric, normal size, without nodules. Trachea is midline. Carotids brisk in upstroke without bruits, no abnormal JVP noted at 45°. Chest: Even excursion  Lungs: Lungs clear to auscultation bilaterally. No retractions or use of accessory muscles. No tactile vocal fremitus. No rhonchi, crackles or rales. Heart:  S1 > S2. Irregular, irregular rhythm. No gallop or murmur. No rub, palpable thrill or heave noted. PMI 5th intercostal space midclavicular line. Abdomen: Abdomen soft, grossly protuberant, non-tender. BS normal. No masses, organomegaly. No hernia noted. Extremities: Extremities normal. No deformities, edema, or skin discoloration. No cyanosis or clubbing noted to the nails. Peripheral pulses present 2+ upper extremities and present 1+  lower extremities. Musculoskeletal: Spine ROM normal. Muscular strength intact. Neuro: Cranial nerves intact. Motor: Strength 5/5 in all extremities. Reflexes 2+ in all extremities. No focal weakness. Sensory: grossly normal to touch. Coordination intact. Pertinent Labs:  CBC:   Recent Labs     02/17/20 0614   WBC 8.2   HGB 11.6*        BMP:  Recent Labs     02/17/20  0614      K 4.4      CO2 28   BUN 42*   CREATININE 2.5*   GLUCOSE 97   LABGLOM 25     ABGs:   Lab Results   Component Value Date    PH 7.424 11/30/2015    PO2 72.1 11/30/2015    PCO2 46.4 11/30/2015     INR:   No results for input(s): INR in the last 72 hours.   PRO-BNP:   Lab Results   Component Value Date    PROBNP 10,725 (H) 02/17/2020    PROBNP 23,841 (H) 02/01/2020      Cardiac Injury Profile:   Recent Labs     02/17/20  0614 02/17/20  1222   TROPONINI 0.01 0.01      Lipid

## 2020-02-18 NOTE — PROGRESS NOTES
(TYLENOL) tablet 650 mg, 650 mg, Oral, Q4H PRN, Manpreet Thompson, , 650 mg at 02/17/20 2020    Objective:    BP (!) 130/99   Pulse 119   Temp 98.2 °F (36.8 °C) (Temporal)   Resp 18   Ht 5' 10\" (1.778 m)   Wt 255 lb (115.7 kg)   SpO2 97%   BMI 36.59 kg/m²     Heart:  irreg  Lungs:  CTA bilaterally, no wheeze, rales or rhonchi  Abd: bowel sounds present, nontender, nondistended, no masses  Extrem:  Edema legs    CBC with Differential:    Lab Results   Component Value Date    WBC 8.2 02/17/2020    RBC 3.95 02/17/2020    HGB 11.6 02/17/2020    HCT 37.9 02/17/2020     02/17/2020    MCV 95.9 02/17/2020    MCH 29.4 02/17/2020    MCHC 30.6 02/17/2020    RDW 16.1 02/17/2020    BANDSPCT 2 12/30/2015    METASPCT 1 12/30/2015    LYMPHOPCT 21.3 02/17/2020    MONOPCT 9.6 02/17/2020    BASOPCT 0.6 02/17/2020    MONOSABS 0.78 02/17/2020    LYMPHSABS 1.74 02/17/2020    EOSABS 0.44 02/17/2020    BASOSABS 0.05 02/17/2020     CMP:    Lab Results   Component Value Date     02/17/2020    K 4.4 02/17/2020     02/17/2020    CO2 28 02/17/2020    BUN 42 02/17/2020    CREATININE 2.5 02/17/2020    GFRAA 31 02/17/2020    LABGLOM 25 02/17/2020    GLUCOSE 97 02/17/2020    PROT 7.3 02/17/2020    LABALBU 3.8 02/17/2020    CALCIUM 9.5 02/17/2020    BILITOT 0.7 02/17/2020    ALKPHOS 79 02/17/2020    AST 19 02/17/2020    ALT 15 02/17/2020     Warfarin PT/INR:    Lab Results   Component Value Date    INR 1.7 01/28/2020    INR 3.4 01/17/2020    INR 1.8 01/05/2020    PROTIME 19.5 (H) 01/28/2020    PROTIME 38.9 (H) 01/17/2020    PROTIME 20.0 (H) 01/05/2020       Assessment:    Active Problems:    Hyperlipidemia    CAD (coronary artery disease)    S/P CABG (coronary artery bypass graft)    CARLTON (obstructive sleep apnea)    Chronic kidney disease, stage III (moderate) ([facility])    HTN (hypertension), benign    Chest pain    Atrial fibrillation, persistent    Obesity    Chronic combined systolic and diastolic CHF (congestive heart failure) ([facility]  Resolved Problems:    * No resolved hospital problems.  *      Plan:  Dc home outpt f/u        [facility]  8:28 AM  2/18/2020

## 2020-02-19 NOTE — DISCHARGE SUMMARY
[address]                  [address] [facility],  [facility]                               DISCHARGE SUMMARY    PATIENT NAME: Gabriella Velasquez                      :        1945  MED REC NO:   [id number]                            ROOM:       8209  ACCOUNT NO:   [de-identified]                           ADMIT DATE: 2020  PROVIDER:     Jose Enrique Walker DO                  DISCHARGE DATE:  2020      DISCHARGE DIAGNOSES:  Chest pain, chronic systolic/diastolic congestive  heart failure, coronary artery disease status post CABG, chronic  amiodarone therapy, chronic Eliquis therapy, hyperlipidemia, obesity,  hypothyroidism, obstructive sleep apnea, hypertension, CKD III, and  chronic atrial fibrillation. HOSPITAL COURSE:  The patient is a 72-year-old  male who  presented to the hospital with complaints of chest pain, shortness of  breath. He was assigned observation status. He was seen by Cardiology. His cardiac enzymes were normal.  No advanced cardiac testing was  recommended by Cardiology who recommended continued diuresis and  followup. The patient was discharged to home in stable condition on  2020 with recommendations to follow up as an outpatient with his  primary care physician, Dr. Claudia Dolan and to also follow up with  cardiologist in [place] as scheduled.     DISCHARGE MEDICATIONS:  As per discharge med rec, which include Desyrel  100 mg nightly p.r.n., allopurinol, amiodarone, Eliquis, aspirin, Bumex,  Wellbutrin XL, hydralazine, isosorbide, Nizoral topical, levothyroxine,  Milk of Mag p.r.n., Toprol-XL, GlycoLax p.r.n., Zanaflex p.r.n., Kenalog  topical.        Rojas Duran DO    D: 2020 13:28:27       T: 2020 13:34:09     [id number]  Job#: [id number]     Doc#: [id number]    CC:  Claudia Dolan MD

## 2020-03-24 NOTE — TELEPHONE ENCOUNTER
MA called patient regarding his upcoming appointment at the [facility] office with  on 4/20/20. MA reached patient VM and left detailed message. MA  discussed concerns in light of the current Pandemic with COVID 19 and limiting exposure. We discussed that while there continue to be daily developments and information regarding this virus, we recognize the need to take all measures to ensure we protect the health and welfare of the patients we serve. Explained we are in need to reschedule upcoming appointment. Appt was rescheduled for 6/1/2020 @ 2:30pm in [facility] office with . MA left appointment details on patient VM dn office number for patient to return call if appointment wont work. .      Electronically signed by Donato Olivares MA on 3/24/20 at 1:33 PM EDT

## 2020-06-01 NOTE — TELEPHONE ENCOUNTER
MA received a call from patient who states he is having back trouble and is not really able to walk. Patient cancelled his appointment his follow up appointment with Dr. Valentino Mancera today. Patient reports he is not having any problems related to the hernia at this time.  Patient is requesting a call in July for a poss appointment in August. Forwarding to Kumar Culver for informational purposes  Electronically signed by Vikki Escobar on 6/1/20 at 11:33 AM EDT

## 2020-06-01 NOTE — TELEPHONE ENCOUNTER
MA set reminder in epic to call patient in July to see if wants to reschedule.       Electronically signed by Katya Strickland MA on 6/1/20 at 2:27 PM EDT

## 2020-07-20 NOTE — TELEPHONE ENCOUNTER
----- Message from Alex Rossi MA sent at 6/1/2020  2:26 PM EDT -----  -Call pt to see if wants to schedule hernia follow up for in August.      Electronically signed by Alex Rossi MA on 6/1/20 at 2:26 PM EDT

## 2020-07-20 NOTE — TELEPHONE ENCOUNTER
MA attempt to contact patient to schedule hernia follow up appoitment. Patient cell number is not in service. MA reached patient VM on home number and left detailed message of why was calling and office number for patient to call office back and set up appt.         Electronically signed by Curtis Jiménez MA on 7/20/20 at 10:25 AM EDT

## 2020-07-24 NOTE — TELEPHONE ENCOUNTER
MA attempt to contact patient to schedule hernia follow up appoitment. Patient cell number is not in service. MA reached patient VM on home number and left detailed message of why was calling and office number for patient to call office back and set up appt.       Electronically signed by Song Barrett MA on 7/24/20 at 2:04 PM EDT

## 2020-07-28 NOTE — TELEPHONE ENCOUNTER
MA mailed a letter to patient letting him know that the office has been trying to get in contact with him to schedule follow up appt. MA advised patient to call office to set up appointment.          Electronically signed by Ryan Cat MA on 7/28/20 at 9:56 AM EDT
